# Patient Record
Sex: MALE | Race: BLACK OR AFRICAN AMERICAN | Employment: FULL TIME | URBAN - METROPOLITAN AREA
[De-identification: names, ages, dates, MRNs, and addresses within clinical notes are randomized per-mention and may not be internally consistent; named-entity substitution may affect disease eponyms.]

---

## 2017-03-03 ENCOUNTER — OFFICE VISIT (OUTPATIENT)
Dept: INTERNAL MEDICINE CLINIC | Age: 27
End: 2017-03-03

## 2017-03-03 VITALS
RESPIRATION RATE: 16 BRPM | HEART RATE: 76 BPM | HEIGHT: 66 IN | BODY MASS INDEX: 20.73 KG/M2 | DIASTOLIC BLOOD PRESSURE: 68 MMHG | WEIGHT: 129 LBS | SYSTOLIC BLOOD PRESSURE: 102 MMHG | OXYGEN SATURATION: 98 % | TEMPERATURE: 98.4 F

## 2017-03-03 DIAGNOSIS — F32.A DEPRESSION, UNSPECIFIED DEPRESSION TYPE: Primary | Chronic | ICD-10-CM

## 2017-03-03 DIAGNOSIS — Z23 ENCOUNTER FOR IMMUNIZATION: ICD-10-CM

## 2017-03-03 DIAGNOSIS — B20 HIV (HUMAN IMMUNODEFICIENCY VIRUS INFECTION) (HCC): ICD-10-CM

## 2017-03-03 NOTE — MR AVS SNAPSHOT
Visit Information Date & Time Provider Department Dept. Phone Encounter #  
 3/3/2017 11:45 AM Arnav Ortega, [facility] [phone] [id number] Follow-up Instructions Return in about 6 months (around 9/3/2017). Your Appointments 3/6/2017 11:30 AM  
ROUTINE CARE with Ewa Gant MD  
[facility] [address] [facility]) Appt Note: 4 month f/u; pt r/s from 2/13/17 819 [facility],[address] [address]  
[address] 235 [facility] [address] [place] [address] Upcoming Health Maintenance Date Due Pneumococcal 19-64 Highest Risk (1 of 3 - PCV13) 12/18/2008 DTaP/Tdap/Td series (1 - Tdap) 12/18/2010 INFLUENZA AGE 9 TO ADULT 8/1/2016 Allergies as of 3/3/2017  Review Complete On: 3/3/2017 By: Emperatriz Elizabeth III, DO No Known Allergies Current Immunizations  Reviewed on 9/12/2016 Name Date Hep A Vaccine (Adult) 5/10/2016 Influenza Vaccine 12/3/2013 Influenza Vaccine Intradermal PF 10/20/2015 TB Skin Test (PPD) Intradermal 7/7/2014 Not reviewed this visit You Were Diagnosed With   
  
 Codes Comments Depression, unspecified depression type    -  Primary ICD-10-CM: F32.9 ICD-9-CM: 463 HIV (human immunodeficiency virus infection) ([facility]     ICD-10-CM: U34 ICD-9-CM: V08 Vitals BP  
  
  
  
  
  
 102/68 (BP 1 Location: Left arm, BP Patient Position: Sitting) Vitals History BMI and BSA Data Body Mass Index Body Surface Area  
 20.82 kg/m 2 1.65 m 2 Preferred Pharmacy Pharmacy Name Phone [address] [facility] DEPT Maximo Ruiz [address] [phone] Your Updated Medication List  
  
   
This list is accurate as of: 3/3/17 12:24 PM.  Always use your most recent med list.  
  
  
  
  
 efavirenz-emtrictabine-tenofovir tablet Commonly known as:  ATRIPLA Take 1 Tab by mouth nightly. elvitegravir-cobicistat-emtricitabine-tenofovir alafenamide Tab tablet Commonly known as:  Grace Dubois Take 1 Tab by mouth daily. Indications: HIV INFECTION  
  
 sertraline 50 mg tablet Commonly known as:  ZOLOFT Take 1 Tab by mouth daily. Follow-up Instructions Return in about 6 months (around 9/3/2017). Patient Instructions Recovering From Depression: Care Instructions Your Care Instructions Taking good care of yourself is important as you recover from depression. In time, your symptoms will fade as your treatment takes hold. Do not give up. Instead, focus your energy on getting better. Your mood will improve. It just takes some time. Focus on things that can help you feel better, such as being with friends and family, eating well, and getting enough rest. But take things slowly. Do not do too much too soon. You will begin to feel better gradually. Follow-up care is a key part of your treatment and safety. Be sure to make and go to all appointments, and call your doctor if you are having problems. It's also a good idea to know your test results and keep a list of the medicines you take. How can you care for yourself at home? Be realistic · If you have a large task to do, break it up into smaller steps you can handle, and just do what you can. · You may want to put off important decisions until your depression has lifted. If you have plans that will have a major impact on your life, such as marriage, divorce, or a job change, try to wait a bit. Talk it over with friends and loved ones who can help you look at the overall picture first. 
· Reaching out to people for help is important. Do not isolate yourself. Let your family and friends help you. Find someone you can trust and confide in, and talk to that person. · Be patient, and be kind to yourself.  Remember that depression is not your fault and is not something you can overcome with willpower alone. Treatment is necessary for depression, just like for any other illness. Feeling better takes time, and your mood will improve little by little. Stay active · Stay busy and get outside. Take a walk, or try some other light exercise. · Talk with your doctor about an exercise program. Exercise can help with mild depression. · Go to a movie or concert. Take part in a Scientology activity or other social gathering. Go to a ball game. · Ask a friend to have dinner with you. Take care of yourself · Eat a balanced diet with plenty of fresh fruits and vegetables, whole grains, and lean protein. If you have lost your appetite, eat small snacks rather than large meals. · Avoid drinking alcohol or using illegal drugs. Do not take medicines that have not been prescribed for you. They may interfere with medicines you may be taking for depression, or they may make your depression worse. · Take your medicines exactly as they are prescribed. You may start to feel better within 1 to 3 weeks of taking antidepressant medicine. But it can take as many as 6 to 8 weeks to see more improvement. If you have questions or concerns about your medicines, or if you do not notice any improvement by 3 weeks, talk to your doctor. · If you have any side effects from your medicine, tell your doctor. Antidepressants can make you feel tired, dizzy, or nervous. Some people have dry mouth, constipation, headaches, sexual problems, or diarrhea. Many of these side effects are mild and will go away on their own after you have been taking the medicine for a few weeks. Some may last longer. Talk to your doctor if side effects are bothering you too much. You might be able to try a different medicine. · Get enough sleep. If you have problems sleeping: ¨ Go to bed at the same time every night, and get up at the same time every morning. ¨ Keep your bedroom dark and quiet. ¨ Do not exercise after 5:00 p.m. ¨ Avoid drinks with caffeine after 5:00 p.m. · Avoid sleeping pills unless they are prescribed by the doctor treating your depression. Sleeping pills may make you groggy during the day, and they may interact with other medicine you are taking. · If you have any other illnesses, such as diabetes, heart disease, or high blood pressure, make sure to continue with your treatment. Tell your doctor about all of the medicines you take, including those with or without a prescription. · Keep the numbers for these national suicide hotlines: 9-819-976-TALK (8-525.263.5965) and 1-434-BYYWQMR (1-559.859.7111). If you or someone you know talks about suicide or feeling hopeless, get help right away. When should you call for help? Call 911 anytime you think you may need emergency care. For example, call if: 
· You feel like hurting yourself or someone else. · Someone you know has depression and is about to attempt or is attempting suicide. Call your doctor now or seek immediate medical care if: 
· You hear voices. · Someone you know has depression and: 
¨ Starts to give away his or her possessions. ¨ Uses illegal drugs or drinks alcohol heavily. ¨ Talks or writes about death, including writing suicide notes or talking about guns, knives, or pills. ¨ Starts to spend a lot of time alone. ¨ Acts very aggressively or suddenly appears calm. Watch closely for changes in your health, and be sure to contact your doctor if: 
· You do not get better as expected. Where can you learn more? Go to http://hari-cindy.info/. Enter N292 in the search box to learn more about \"Recovering From Depression: Care Instructions. \" Current as of: July 26, 2016 Content Version: 11.1 © 5346-1145 noodls, Incorporated.  Care instructions adapted under license by [employer] (which disclaims liability or warranty for this information). If you have questions about a medical condition or this instruction, always ask your healthcare professional. [address] any warranty or liability for your use of this information. Introducing [facility] & HEALTH SERVICES! Dear Elbert Araujo: Thank you for requesting a Sportsgrit account. Our records indicate that you already have an active Sportsgrit account. You can access your account anytime at [email or web address] Did you know that you can access your hospital and ER discharge instructions at any time in Sportsgrit? You can also review all of your test results from your hospital stay or ER visit. Additional Information If you have questions, please visit the Frequently Asked Questions section of the Sportsgrit website at [email or web address] Remember, Sportsgrit is NOT to be used for urgent needs. For medical emergencies, dial 911. Now available from your iPhone and Android! Please provide this summary of care documentation to your next provider. Your primary care clinician is listed as Professor Lynch 108 If you have any questions after today's visit, please call [phone].

## 2017-03-03 NOTE — PATIENT INSTRUCTIONS
Recovering From Depression: Care Instructions  Your Care Instructions  Taking good care of yourself is important as you recover from depression. In time, your symptoms will fade as your treatment takes hold. Do not give up. Instead, focus your energy on getting better. Your mood will improve. It just takes some time. Focus on things that can help you feel better, such as being with friends and family, eating well, and getting enough rest. But take things slowly. Do not do too much too soon. You will begin to feel better gradually. Follow-up care is a key part of your treatment and safety. Be sure to make and go to all appointments, and call your doctor if you are having problems. It's also a good idea to know your test results and keep a list of the medicines you take. How can you care for yourself at home? Be realistic  · If you have a large task to do, break it up into smaller steps you can handle, and just do what you can. · You may want to put off important decisions until your depression has lifted. If you have plans that will have a major impact on your life, such as marriage, divorce, or a job change, try to wait a bit. Talk it over with friends and loved ones who can help you look at the overall picture first.  · Reaching out to people for help is important. Do not isolate yourself. Let your family and friends help you. Find someone you can trust and confide in, and talk to that person. · Be patient, and be kind to yourself. Remember that depression is not your fault and is not something you can overcome with willpower alone. Treatment is necessary for depression, just like for any other illness. Feeling better takes time, and your mood will improve little by little. Stay active  · Stay busy and get outside. Take a walk, or try some other light exercise. · Talk with your doctor about an exercise program. Exercise can help with mild depression. · Go to a movie or concert.  Take part in a Yazidi activity or other social gathering. Go to a Teklatech game. · Ask a friend to have dinner with you. Take care of yourself  · Eat a balanced diet with plenty of fresh fruits and vegetables, whole grains, and lean protein. If you have lost your appetite, eat small snacks rather than large meals. · Avoid drinking alcohol or using illegal drugs. Do not take medicines that have not been prescribed for you. They may interfere with medicines you may be taking for depression, or they may make your depression worse. · Take your medicines exactly as they are prescribed. You may start to feel better within 1 to 3 weeks of taking antidepressant medicine. But it can take as many as 6 to 8 weeks to see more improvement. If you have questions or concerns about your medicines, or if you do not notice any improvement by 3 weeks, talk to your doctor. · If you have any side effects from your medicine, tell your doctor. Antidepressants can make you feel tired, dizzy, or nervous. Some people have dry mouth, constipation, headaches, sexual problems, or diarrhea. Many of these side effects are mild and will go away on their own after you have been taking the medicine for a few weeks. Some may last longer. Talk to your doctor if side effects are bothering you too much. You might be able to try a different medicine. · Get enough sleep. If you have problems sleeping:  ¨ Go to bed at the same time every night, and get up at the same time every morning. ¨ Keep your bedroom dark and quiet. ¨ Do not exercise after 5:00 p.m. ¨ Avoid drinks with caffeine after 5:00 p.m. · Avoid sleeping pills unless they are prescribed by the doctor treating your depression. Sleeping pills may make you groggy during the day, and they may interact with other medicine you are taking. · If you have any other illnesses, such as diabetes, heart disease, or high blood pressure, make sure to continue with your treatment.  Tell your doctor about all of the medicines you take, including those with or without a prescription. · Keep the numbers for these national suicide hotlines: 8-798-586-TALK (2-214.100.6072) and 6-121-LALSGRT (6-972.757.6866). If you or someone you know talks about suicide or feeling hopeless, get help right away. When should you call for help? Call 911 anytime you think you may need emergency care. For example, call if:  · You feel like hurting yourself or someone else. · Someone you know has depression and is about to attempt or is attempting suicide. Call your doctor now or seek immediate medical care if:  · You hear voices. · Someone you know has depression and:  ¨ Starts to give away his or her possessions. ¨ Uses illegal drugs or drinks alcohol heavily. ¨ Talks or writes about death, including writing suicide notes or talking about guns, knives, or pills. ¨ Starts to spend a lot of time alone. ¨ Acts very aggressively or suddenly appears calm. Watch closely for changes in your health, and be sure to contact your doctor if:  · You do not get better as expected. Where can you learn more? Go to http://hari-cindy.info/. Enter E323 in the search box to learn more about \"Recovering From Depression: Care Instructions. \"  Current as of: July 26, 2016  Content Version: 11.1  © 6144-9111 J[employer], Incorporated. Care instructions adapted under license by [employer] (which disclaims liability or warranty for this information). If you have questions about a medical condition or this instruction, always ask your healthcare professional. [place] [address] any warranty or liability for your use of this information.

## 2017-03-03 NOTE — PROGRESS NOTES
Hassel Collet III is a 31 y.o. male who presents for evaluation of routine follow up. Last seen by me aug 22. Doing well. zoloft really helps. Pt with no concerns. Had his 4 wisdom teeth pulled out last month. ROS:  Constitutional: negative for fevers, chills, anorexia and weight loss  Eyes:   negative for visual disturbance and irritation  ENT:   negative for tinnitus,sore throat,nasal congestion,ear pain,hoarseness  Respiratory:  negative for cough, hemoptysis, dyspnea,wheezing  CV:   negative for chest pain, palpitations, lower extremity edema  GI:   negative for nausea, vomiting, diarrhea, abdominal pain,melena  Genitourinary: negative for frequency, dysuria and hematuria  Musculoskel: negative for myalgias, arthralgias, back pain, muscle weakness, joint pain  Neurological:  negative for headaches, dizziness, focal weakness, numbness  Psychiatric:     ++ for depression or anxiety--well treated with zoloft      Past Medical History:   Diagnosis Date    Asthma     Depression     Depression 8/22/2016    HIV (human immunodeficiency virus infection) ([facility] 12/3/2013       Past Surgical History:   Procedure Laterality Date    HX WISDOM TEETH EXTRACTION         Family History   Problem Relation Age of Onset    Hypertension Mother     Diabetes Mother     Depression Mother     Depression Father     Hypertension Father        Social History     Social History    Marital status: SINGLE     Spouse name: N/A    Number of children: N/A    Years of education: N/A     Occupational History    Not on file.      Social History Main Topics    Smoking status: Current Some Day Smoker    Smokeless tobacco: Never Used    Alcohol use Yes      Comment: Occasionally    Drug use: No    Sexual activity: Yes     Partners: Male     Other Topics Concern    Not on file     Social History Narrative            Visit Vitals    /68 (BP 1 Location: Left arm, BP Patient Position: Sitting)    Pulse 76    Temp 98.4 °F (36.9 °C) (Oral)    Resp 16    Ht 5' 6\" (1.676 m)    Wt 129 lb (58.5 kg)    SpO2 98%    BMI 20.82 kg/m2       Physical Examination:   General - Well appearing male  HEENT - PERRL, TM no erythema/opacification, normal nasal turbinates, no oropharyngeal erythema or exudate, MMM  Neck - supple, no bruits, no thyroidomegaly, no lymphadenopathy  Pulm - clear to auscultation bilaterally  Cardio - RRR, normal S1 S2, no murmur  Abd - soft, nontender, no masses, no HSM  Extrem - no edema, +2 distal pulses  Neuro-  No focal deficits, CN intact     Assessment/Plan:    1. Depression--doing well with zoloft  2.  hiv--follows with dr Delmy Ramirez, on genvoya  3. Tobacco abuse--few cigarettes daily, cutting back  4. Routine adult health maintenance--flu and pneumovax given today. rtc 6 months, give tdap then, check labs.         Eric Huntley III, DO

## 2017-03-03 NOTE — PROGRESS NOTES
Reviewed record in preparation for visit and have obtained necessary documentation. Identified pt with two pt identifiers(name and ). Chief Complaint   Patient presents with    Depression     follow up       Health Maintenance Due   Topic Date Due    Pneumococcal 19-64 Highest Risk (1 of 3 - PCV13) 2008    DTaP/Tdap/Td series (1 - Tdap) 2010    INFLUENZA AGE 9 TO ADULT  2016       Mr. Sayda Newton has a reminder for a \"due or due soon\" health maintenance. I have asked that he discuss health maintenance topic(s) due with His  primary care provider. Coordination of Care Questionnaire:  :     1) Have you been to an emergency room, urgent care clinic since your last visit? no   Hospitalized since your last visit? no             2) Have you seen or consulted any other health care providers outside of [address] [place] [address] since your last visit? no  (Include any pap smears or colon screenings in this section.)      Patient is accompanied by self I have received verbal consent from Patric Baumgarten III to discuss any/all medical information while they are present in the room.

## 2017-03-06 ENCOUNTER — OFFICE VISIT (OUTPATIENT)
Dept: INTERNAL MEDICINE CLINIC | Age: 27
End: 2017-03-06

## 2017-03-06 VITALS
TEMPERATURE: 97.5 F | RESPIRATION RATE: 16 BRPM | SYSTOLIC BLOOD PRESSURE: 105 MMHG | WEIGHT: 132.8 LBS | HEIGHT: 66 IN | HEART RATE: 80 BPM | DIASTOLIC BLOOD PRESSURE: 68 MMHG | OXYGEN SATURATION: 99 % | BODY MASS INDEX: 21.34 KG/M2

## 2017-03-06 DIAGNOSIS — B20 HUMAN IMMUNODEFICIENCY VIRUS (HIV) DISEASE (HCC): Primary | ICD-10-CM

## 2017-03-06 NOTE — MR AVS SNAPSHOT
Visit Information Date & Time Provider Department Dept. Phone Encounter #  
 3/6/2017 11:30 AM Ivy Frausto, [address] [place] [address],[address] [phone] [id number] Follow-up Instructions Return in about 4 months (around 7/6/2017), or if symptoms worsen or fail to improve. Your Appointments 9/8/2017 11:45 AM  
ROUTINE CARE with Brianmamadou Gibson III, DO [facility] [address]) Appt Note: 6 month follow up  
 [facility] [address] [address]  
[address] [facility] [address] [place] [address] Upcoming Health Maintenance Date Due DTaP/Tdap/Td series (1 - Tdap) 12/18/2010 Pneumococcal 19-64 Highest Risk (2 of 3 - PCV13) 3/3/2018 Allergies as of 3/6/2017  Review Complete On: 3/6/2017 By: Ivy Frausto MD  
 No Known Allergies Current Immunizations  Reviewed on 3/6/2017 Name Date Hep A Vaccine (Adult) 5/10/2016 Influenza Vaccine 12/3/2013 Influenza Vaccine (Quad) PF 3/3/2017 Influenza Vaccine Intradermal PF 10/20/2015 Pneumococcal Polysaccharide (PPSV-23) 3/3/2017 TB Skin Test (PPD) Intradermal 7/7/2014 Reviewed by Ivy Frausto MD on 3/6/2017 at 11:44 AM  
You Were Diagnosed With   
  
 Codes Comments Human immunodeficiency virus (HIV) disease ([facility]    -  Primary ICD-10-CM: B20 
ICD-9-CM: 659 Vitals BP Pulse Temp Resp Height(growth percentile) Weight(growth percentile) 105/68 (BP 1 Location: Left arm, BP Patient Position: Sitting) 80 97.5 °F (36.4 °C) (Oral) 16 5' 6\" (1.676 m) 132 lb 12.8 oz (60.2 kg) SpO2 BMI Smoking Status 99% 21.43 kg/m2 Current Some Day Smoker Vitals History BMI and BSA Data Body Mass Index Body Surface Area  
 21.43 kg/m 2 1.67 m 2 Preferred Pharmacy Pharmacy Name Phone [facility] [facility] [place] DEPT Maximo Chopra [address] [phone] Your Updated Medication List  
  
   
This list is accurate as of: 3/6/17 11:49 AM.  Always use your most recent med list.  
  
  
  
  
 elvitegravir-cobicistat-emtricitabine-tenofovir alafenamide Tab tablet Commonly known as:  Linda Lawrence Take 1 Tab by mouth daily. Indications: HIV INFECTION  
  
 sertraline 50 mg tablet Commonly known as:  ZOLOFT Take 1 Tab by mouth daily. We Performed the Following HIV-1 RNA QT BY PCR [98416 CPT(R)] LYMPHOCYTES, CD4 PERCENT AND ABSOLUTE [59136 CPT(R)] Follow-up Instructions Return in about 4 months (around 2017), or if symptoms worsen or fail to improve. Introducing [facility] & HEALTH SERVICES! Dear Pauline Apgar: Thank you for requesting a Parudi account. Our records indicate that you already have an active Parudi account. You can access your account anytime at [email or web address] Did you know that you can access your hospital and ER discharge instructions at any time in Parudi? You can also review all of your test results from your hospital stay or ER visit. Additional Information If you have questions, please visit the Frequently Asked Questions section of the Parudi website at [email or web address] Remember, Parudi is NOT to be used for urgent needs. For medical emergencies, dial 911. Now available from your iPhone and Android! Please provide this summary of care documentation to your next provider. Your primary care clinician is listed as Fili Espinoza If you have any questions after today's visit, please call [phone].

## 2017-03-06 NOTE — PROGRESS NOTES
The patient will begin 1000 units of OTC vitamin D daily plus exposure to direct sunlight for 15-20 minutes daily. Vitamin D levels will be rechecked at next visit. JODI Lima is a 31 y.o., male and is here today for follow up of HIV infection. The patient has been 100% compliant with HAART. Last CD4 count and Viral Load were 654 (43.6%) and less than 20 cps/ml on 9/12/16. Current HAART is comprised of Genvoya. His only other regular medication is Zoloft for depression, which is much better since getting him off Sustiva. He is also vitamin D deficient. His PCP is Dr. Huseyin Thorne. Allergy history, medication list, past medical history, and social history were all reviewed. No changes were made in any of these. Up to date on all immunizations. Due for pneumovax 23 at next visit. ROS   No fever, sweats, chills, nausea, vomiting, diarrhea, or rash. Weight has been stable. Level of energy is good. No changes in vision. No headaches or cognitive impairment. No medications have been added to the regimen since last visit. No signs of peripheral neuropathy. Review of systems otherwise negative with greater than 10 systems reviewed. Physical Exam  Vital signs and weight are stable. EOMI. Sclera anicteric. No thrush or leukoplakia. Lungs clear to A&P. Regular rhythm without murmur. Abdomen without organomegaly, mass, or tenderness. Bowel sounds normal. No joint abnormalities or edema. No change in lymphadenopathy. Neurologic exam is nonfocal.      ASSESSMENT and PLAN  #1. HIV infection. HIV is clinically and virologically stable. No change in HAART is planned at this time. CD4 count and viral load will be ordered today. Return in 4 months for follow up. The patient will check My Chart for lab results and call if there are any questions. #2. Vit D deficiency. The patient will begin 1000 units of OTC vitamin D daily plus exposure to direct sunlight for 15-20 minutes daily.   Vitamin D levels will be rechecked at next visit. #3. Depression. Much better off Sustiva and on current dose of Zoloft.

## 2017-03-07 LAB
BASOPHILS # BLD AUTO: 0 X10E3/UL (ref 0–0.2)
BASOPHILS NFR BLD AUTO: 0 %
CD3+CD4+ CELLS # BLD: 711 /UL (ref 359–1519)
CD3+CD4+ CELLS NFR BLD: 37.4 % (ref 30.8–58.5)
EOSINOPHIL # BLD AUTO: 0.1 X10E3/UL (ref 0–0.4)
EOSINOPHIL NFR BLD AUTO: 1 %
ERYTHROCYTE [DISTWIDTH] IN BLOOD BY AUTOMATED COUNT: 14.3 % (ref 12.3–15.4)
HCT VFR BLD AUTO: 39.4 % (ref 37.5–51)
HGB BLD-MCNC: 13.4 G/DL (ref 12.6–17.7)
HIV1 RNA # SERPL NAA+PROBE: <20 COPIES/ML
HIV1 RNA SERPL NAA+PROBE-LOG#: NORMAL LOG10COPY/ML
IMM GRANULOCYTES # BLD: 0 X10E3/UL (ref 0–0.1)
IMM GRANULOCYTES NFR BLD: 0 %
LYMPHOCYTES # BLD AUTO: 1.9 X10E3/UL (ref 0.7–3.1)
LYMPHOCYTES NFR BLD AUTO: 55 %
MCH RBC QN AUTO: 31.2 PG (ref 26.6–33)
MCHC RBC AUTO-ENTMCNC: 34 G/DL (ref 31.5–35.7)
MCV RBC AUTO: 92 FL (ref 79–97)
MONOCYTES # BLD AUTO: 0.3 X10E3/UL (ref 0.1–0.9)
MONOCYTES NFR BLD AUTO: 7 %
NEUTROPHILS # BLD AUTO: 1.3 X10E3/UL (ref 1.4–7)
NEUTROPHILS NFR BLD AUTO: 37 %
PLATELET # BLD AUTO: 173 X10E3/UL (ref 150–379)
RBC # BLD AUTO: 4.29 X10E6/UL (ref 4.14–5.8)
WBC # BLD AUTO: 3.5 X10E3/UL (ref 3.4–10.8)

## 2017-04-24 DIAGNOSIS — B20 HUMAN IMMUNODEFICIENCY VIRUS (HIV) DISEASE (HCC): ICD-10-CM

## 2017-04-24 NOTE — TELEPHONE ENCOUNTER
Received phone call from Tyrone Lawson RN with the [facility]. Pt is in need of refill for Genvoya. Medication was ordered and pended to Dr. Zenobia Salcedo.

## 2017-05-23 RX ORDER — SERTRALINE HYDROCHLORIDE 50 MG/1
50 TABLET, FILM COATED ORAL DAILY
Qty: 30 TAB | Refills: 9 | Status: SHIPPED | OUTPATIENT
Start: 2017-05-23 | End: 2018-05-14 | Stop reason: SDUPTHER

## 2017-11-01 ENCOUNTER — PATIENT OUTREACH (OUTPATIENT)
Dept: INTERNAL MEDICINE CLINIC | Age: 27
End: 2017-11-01

## 2017-11-03 ENCOUNTER — TELEPHONE (OUTPATIENT)
Dept: FAMILY MEDICINE CLINIC | Age: 27
End: 2017-11-03

## 2017-11-03 DIAGNOSIS — B20 HIV (HUMAN IMMUNODEFICIENCY VIRUS INFECTION) (HCC): ICD-10-CM

## 2017-11-03 NOTE — TELEPHONE ENCOUNTER
I attempted to contact patient regarding scheduling an appointment with Dr Shruthi Murrell for ADAP refill. No answer. I left vm to return call. **Pt needs to be scheduled in next available appointment slot. **

## 2017-11-06 NOTE — TELEPHONE ENCOUNTER
I called and spoke with patient. Pt was informed that he needs labs done to have refills for ADAP. He verbalized understanding. Labs ordered. He will try to have labs done this week. He states that he will come by the office to pickup lab slip. He was informed of location of office. He verbalized understanding. He was informed that I will send 3 months of Genvoya to pharmacy per Dr Mattie Bueno. He verbalized understanding. Pt was scheduled for appointment with Dr Mattie Bueno 11/16/17 @ 0900. He was informed that labs need to be done before we see him. He verbalized understanding.

## 2017-11-07 DIAGNOSIS — B20 HIV (HUMAN IMMUNODEFICIENCY VIRUS INFECTION) (HCC): Primary | ICD-10-CM

## 2017-11-13 LAB
25(OH)D3+25(OH)D2 SERPL-MCNC: 30.1 NG/ML (ref 30–100)
ALBUMIN SERPL-MCNC: 4.9 G/DL (ref 3.5–5.5)
ALBUMIN/GLOB SERPL: 1.8 {RATIO} (ref 1.2–2.2)
ALP SERPL-CCNC: 69 IU/L (ref 39–117)
ALT SERPL-CCNC: 14 IU/L (ref 0–44)
ANNOTATION COMMENT IMP: NORMAL
AST SERPL-CCNC: 20 IU/L (ref 0–40)
BASOPHILS # BLD AUTO: 0 X10E3/UL (ref 0–0.2)
BASOPHILS NFR BLD AUTO: 0 %
BILIRUB SERPL-MCNC: 0.3 MG/DL (ref 0–1.2)
BUN SERPL-MCNC: 10 MG/DL (ref 6–20)
BUN/CREAT SERPL: 10 (ref 9–20)
CALCIUM SERPL-MCNC: 9.6 MG/DL (ref 8.7–10.2)
CD3+CD4+ CELLS # BLD: 770 /UL (ref 359–1519)
CD3+CD4+ CELLS NFR BLD: 40.5 % (ref 30.8–58.5)
CHLORIDE SERPL-SCNC: 101 MMOL/L (ref 96–106)
CO2 SERPL-SCNC: 26 MMOL/L (ref 18–29)
CREAT SERPL-MCNC: 1.02 MG/DL (ref 0.76–1.27)
EOSINOPHIL # BLD AUTO: 0 X10E3/UL (ref 0–0.4)
EOSINOPHIL NFR BLD AUTO: 0 %
ERYTHROCYTE [DISTWIDTH] IN BLOOD BY AUTOMATED COUNT: 14.4 % (ref 12.3–15.4)
EST. AVERAGE GLUCOSE BLD GHB EST-MCNC: 103 MG/DL
GAMMA INTERFERON BACKGROUND BLD IA-ACNC: 0.04 IU/ML
GFR SERPLBLD CREATININE-BSD FMLA CKD-EPI: 100 ML/MIN/1.73
GFR SERPLBLD CREATININE-BSD FMLA CKD-EPI: 116 ML/MIN/1.73
GLOBULIN SER CALC-MCNC: 2.8 G/DL (ref 1.5–4.5)
GLUCOSE SERPL-MCNC: 86 MG/DL (ref 65–99)
HBA1C MFR BLD: 5.2 % (ref 4.8–5.6)
HBV SURFACE AB SER QL: REACTIVE
HCT VFR BLD AUTO: 41.8 % (ref 37.5–51)
HCV AB S/CO SERPL IA: <0.1 S/CO RATIO (ref 0–0.9)
HGB BLD-MCNC: 14.2 G/DL (ref 12.6–17.7)
HIV1 RNA # SERPL NAA+PROBE: <20 COPIES/ML
HIV1 RNA SERPL NAA+PROBE-LOG#: NORMAL LOG10COPY/ML
IMM GRANULOCYTES # BLD: 0 X10E3/UL (ref 0–0.1)
IMM GRANULOCYTES NFR BLD: 0 %
LYMPHOCYTES # BLD AUTO: 1.9 X10E3/UL (ref 0.7–3.1)
LYMPHOCYTES NFR BLD AUTO: 38 %
M TB IFN-G BLD-IMP: NEGATIVE
M TB IFN-G CD4+ BCKGRND COR BLD-ACNC: 0 IU/ML
M TB IFN-G CD4+ T-CELLS BLD-ACNC: 0.04 IU/ML
MCH RBC QN AUTO: 31.9 PG (ref 26.6–33)
MCHC RBC AUTO-ENTMCNC: 34 G/DL (ref 31.5–35.7)
MCV RBC AUTO: 94 FL (ref 79–97)
MITOGEN IGNF BLD-ACNC: >10 IU/ML
MONOCYTES # BLD AUTO: 0.3 X10E3/UL (ref 0.1–0.9)
MONOCYTES NFR BLD AUTO: 6 %
NEUTROPHILS # BLD AUTO: 2.8 X10E3/UL (ref 1.4–7)
NEUTROPHILS NFR BLD AUTO: 56 %
PLATELET # BLD AUTO: 212 X10E3/UL (ref 150–379)
POTASSIUM SERPL-SCNC: 3.8 MMOL/L (ref 3.5–5.2)
PROT SERPL-MCNC: 7.7 G/DL (ref 6–8.5)
QUANTIFERON INCUBATION: NORMAL
RBC # BLD AUTO: 4.45 X10E6/UL (ref 4.14–5.8)
RPR SER QL: NON REACTIVE
SERVICE CMNT-IMP: NORMAL
SODIUM SERPL-SCNC: 143 MMOL/L (ref 134–144)
WBC # BLD AUTO: 5.1 X10E3/UL (ref 3.4–10.8)

## 2017-11-15 ENCOUNTER — TELEPHONE (OUTPATIENT)
Dept: FAMILY MEDICINE CLINIC | Age: 27
End: 2017-11-15

## 2017-11-15 NOTE — TELEPHONE ENCOUNTER
Pt wants a refill for:  elvitegravir-cobicistat-emtricitabine-tenofovir alafenamide (GENVOYA) tab tablet     States he did have labs done     Pt cancelled his apptmnt for 11/16/17 and did not r/s   States his mom has questions and he has to check her schedule       Best number to reach him is [phone]

## 2017-11-16 NOTE — TELEPHONE ENCOUNTER
I called and spoke with patient. Pt states that he needs refill. He was informed that refills were sent to pharmacy on 11/6/17 to the [facility] department pharmacy at 1140am.     He will call pharmacy.

## 2018-04-17 ENCOUNTER — HOSPITAL ENCOUNTER (EMERGENCY)
Age: 28
Discharge: HOME | End: 2018-04-17
Attending: EMERGENCY MEDICINE
Payer: SELF-PAY

## 2018-04-17 VITALS
SYSTOLIC BLOOD PRESSURE: 127 MMHG | BODY MASS INDEX: 22.5 KG/M2 | TEMPERATURE: 97.3 F | WEIGHT: 140 LBS | HEART RATE: 74 BPM | DIASTOLIC BLOOD PRESSURE: 84 MMHG | RESPIRATION RATE: 16 BRPM | OXYGEN SATURATION: 96 % | HEIGHT: 66 IN

## 2018-04-17 DIAGNOSIS — K52.9 GASTROENTERITIS, ACUTE: Primary | ICD-10-CM

## 2018-04-17 DIAGNOSIS — R11.2 NON-INTRACTABLE VOMITING WITH NAUSEA, UNSPECIFIED VOMITING TYPE: ICD-10-CM

## 2018-04-17 LAB
ALBUMIN SERPL-MCNC: 4.6 G/DL (ref 3.5–5)
ALBUMIN/GLOB SERPL: 1.1 {RATIO} (ref 1.1–2.2)
ALP SERPL-CCNC: 81 U/L (ref 45–117)
ALT SERPL-CCNC: 21 U/L (ref 12–78)
ANION GAP SERPL CALC-SCNC: 5 MMOL/L (ref 5–15)
APPEARANCE UR: ABNORMAL
AST SERPL-CCNC: 20 U/L (ref 15–37)
BACTERIA URNS QL MICRO: NEGATIVE /HPF
BASOPHILS # BLD: 0 K/UL (ref 0–0.1)
BASOPHILS NFR BLD: 0 % (ref 0–1)
BILIRUB SERPL-MCNC: 0.5 MG/DL (ref 0.2–1)
BILIRUB UR QL: NEGATIVE
BUN SERPL-MCNC: 6 MG/DL (ref 6–20)
BUN/CREAT SERPL: 6 (ref 12–20)
CALCIUM SERPL-MCNC: 9.5 MG/DL (ref 8.5–10.1)
CHLORIDE SERPL-SCNC: 108 MMOL/L (ref 97–108)
CO2 SERPL-SCNC: 29 MMOL/L (ref 21–32)
COLOR UR: ABNORMAL
CREAT SERPL-MCNC: 1.08 MG/DL (ref 0.7–1.3)
DIFFERENTIAL METHOD BLD: ABNORMAL
EOSINOPHIL # BLD: 0 K/UL (ref 0–0.4)
EOSINOPHIL NFR BLD: 0 % (ref 0–7)
EPITH CASTS URNS QL MICRO: ABNORMAL /LPF
ERYTHROCYTE [DISTWIDTH] IN BLOOD BY AUTOMATED COUNT: 13.1 % (ref 11.5–14.5)
GLOBULIN SER CALC-MCNC: 4.1 G/DL (ref 2–4)
GLUCOSE SERPL-MCNC: 116 MG/DL (ref 65–100)
GLUCOSE UR STRIP.AUTO-MCNC: NEGATIVE MG/DL
HCT VFR BLD AUTO: 44.7 % (ref 36.6–50.3)
HGB BLD-MCNC: 15.3 G/DL (ref 12.1–17)
HGB UR QL STRIP: NEGATIVE
HYALINE CASTS URNS QL MICRO: ABNORMAL /LPF (ref 0–5)
IMM GRANULOCYTES # BLD: 0 K/UL (ref 0–0.04)
IMM GRANULOCYTES NFR BLD AUTO: 0 % (ref 0–0.5)
KETONES UR QL STRIP.AUTO: NEGATIVE MG/DL
LEUKOCYTE ESTERASE UR QL STRIP.AUTO: NEGATIVE
LYMPHOCYTES # BLD: 1.3 K/UL (ref 0.8–3.5)
LYMPHOCYTES NFR BLD: 20 % (ref 12–49)
MCH RBC QN AUTO: 32.6 PG (ref 26–34)
MCHC RBC AUTO-ENTMCNC: 34.2 G/DL (ref 30–36.5)
MCV RBC AUTO: 95.1 FL (ref 80–99)
MONOCYTES # BLD: 0.2 K/UL (ref 0–1)
MONOCYTES NFR BLD: 3 % (ref 5–13)
NEUTS SEG # BLD: 5.1 K/UL (ref 1.8–8)
NEUTS SEG NFR BLD: 76 % (ref 32–75)
NITRITE UR QL STRIP.AUTO: NEGATIVE
NRBC # BLD: 0 K/UL (ref 0–0.01)
NRBC BLD-RTO: 0 PER 100 WBC
PH UR STRIP: 8 [PH] (ref 5–8)
PLATELET # BLD AUTO: 205 K/UL (ref 150–400)
PMV BLD AUTO: 10.6 FL (ref 8.9–12.9)
POTASSIUM SERPL-SCNC: 3.7 MMOL/L (ref 3.5–5.1)
PROT SERPL-MCNC: 8.7 G/DL (ref 6.4–8.2)
PROT UR STRIP-MCNC: 30 MG/DL
RBC # BLD AUTO: 4.7 M/UL (ref 4.1–5.7)
RBC #/AREA URNS HPF: ABNORMAL /HPF (ref 0–5)
SODIUM SERPL-SCNC: 142 MMOL/L (ref 136–145)
SP GR UR REFRACTOMETRY: 1.02 (ref 1–1.03)
UA: UC IF INDICATED,UAUC: ABNORMAL
UROBILINOGEN UR QL STRIP.AUTO: 0.2 EU/DL (ref 0.2–1)
WBC # BLD AUTO: 6.7 K/UL (ref 4.1–11.1)
WBC URNS QL MICRO: ABNORMAL /HPF (ref 0–4)

## 2018-04-17 PROCEDURE — 74011250636 HC RX REV CODE- 250/636: Performed by: PHYSICIAN ASSISTANT

## 2018-04-17 PROCEDURE — 80053 COMPREHEN METABOLIC PANEL: CPT | Performed by: PHYSICIAN ASSISTANT

## 2018-04-17 PROCEDURE — 96361 HYDRATE IV INFUSION ADD-ON: CPT

## 2018-04-17 PROCEDURE — 81001 URINALYSIS AUTO W/SCOPE: CPT | Performed by: PHYSICIAN ASSISTANT

## 2018-04-17 PROCEDURE — 36415 COLL VENOUS BLD VENIPUNCTURE: CPT | Performed by: PHYSICIAN ASSISTANT

## 2018-04-17 PROCEDURE — 99284 EMERGENCY DEPT VISIT MOD MDM: CPT

## 2018-04-17 PROCEDURE — 74011250637 HC RX REV CODE- 250/637: Performed by: PHYSICIAN ASSISTANT

## 2018-04-17 PROCEDURE — 96374 THER/PROPH/DIAG INJ IV PUSH: CPT

## 2018-04-17 PROCEDURE — 85025 COMPLETE CBC W/AUTO DIFF WBC: CPT | Performed by: PHYSICIAN ASSISTANT

## 2018-04-17 RX ORDER — ONDANSETRON 4 MG/1
4 TABLET, ORALLY DISINTEGRATING ORAL
Qty: 10 TAB | Refills: 0 | Status: SHIPPED | OUTPATIENT
Start: 2018-04-17 | End: 2018-07-10 | Stop reason: ALTCHOICE

## 2018-04-17 RX ORDER — ONDANSETRON 4 MG/1
4 TABLET, ORALLY DISINTEGRATING ORAL
Status: COMPLETED | OUTPATIENT
Start: 2018-04-17 | End: 2018-04-17

## 2018-04-17 RX ORDER — ONDANSETRON 2 MG/ML
4 INJECTION INTRAMUSCULAR; INTRAVENOUS
Status: COMPLETED | OUTPATIENT
Start: 2018-04-17 | End: 2018-04-17

## 2018-04-17 RX ADMIN — ONDANSETRON HYDROCHLORIDE 4 MG: 2 INJECTION, SOLUTION INTRAMUSCULAR; INTRAVENOUS at 09:14

## 2018-04-17 RX ADMIN — ONDANSETRON 4 MG: 4 TABLET, ORALLY DISINTEGRATING ORAL at 09:00

## 2018-04-17 RX ADMIN — SODIUM CHLORIDE 1000 ML: 900 INJECTION, SOLUTION INTRAVENOUS at 09:14

## 2018-04-17 NOTE — DISCHARGE INSTRUCTIONS
Gastroenteritis: Care Instructions  Your Care Instructions    Gastroenteritis is an illness that may cause nausea, vomiting, and diarrhea. It is sometimes called \"stomach flu. \" It can be caused by bacteria or a virus. You will probably begin to feel better in 1 to 2 days. In the meantime, get plenty of rest and make sure you do not become dehydrated. Dehydration occurs when your body loses too much fluid. Follow-up care is a key part of your treatment and safety. Be sure to make and go to all appointments, and call your doctor if you are having problems. It's also a good idea to know your test results and keep a list of the medicines you take. How can you care for yourself at home? · If your doctor prescribed antibiotics, take them as directed. Do not stop taking them just because you feel better. You need to take the full course of antibiotics. · Drink plenty of fluids to prevent dehydration, enough so that your urine is light yellow or clear like water. Choose water and other caffeine-free clear liquids until you feel better. If you have kidney, heart, or liver disease and have to limit fluids, talk with your doctor before you increase your fluid intake. · Drink fluids slowly, in frequent, small amounts, because drinking too much too fast can cause vomiting. · Begin eating mild foods, such as dry toast, yogurt, applesauce, bananas, and rice. Avoid spicy, hot, or high-fat foods, and do not drink alcohol or caffeine for a day or two. Do not drink milk or eat ice cream until you are feeling better. How to prevent gastroenteritis  · Keep hot foods hot and cold foods cold. · Do not eat meats, dressings, salads, or other foods that have been kept at room temperature for more than 2 hours. · Use a thermometer to check your refrigerator. It should be between 34°F and 40°F.  · Defrost meats in the refrigerator or microwave, not on the kitchen counter. · Keep your hands and your kitchen clean.  Wash your hands, cutting boards, and countertops with hot soapy water frequently. · Cook meat until it is well done. · Do not eat raw eggs or uncooked sauces made with raw eggs. · Do not take chances. If food looks or tastes spoiled, throw it out. When should you call for help? Call 911 anytime you think you may need emergency care. For example, call if:  ? · You vomit blood or what looks like coffee grounds. ? · You passed out (lost consciousness). ? · You pass maroon or very bloody stools. ?Call your doctor now or seek immediate medical care if:  ? · You have severe belly pain. ? · You have signs of needing more fluids. You have sunken eyes, a dry mouth, and pass only a little dark urine. ? · You feel like you are going to faint. ? · You have increased belly pain that does not go away in 1 to 2 days. ? · You have new or increased nausea, or you are vomiting. ? · You have a new or higher fever. ? · Your stools are black and tarlike or have streaks of blood. ? Watch closely for changes in your health, and be sure to contact your doctor if:  ? · You are dizzy or lightheaded. ? · You urinate less than usual, or your urine is dark yellow or brown. ? · You do not feel better with each day that goes by. Where can you learn more? Go to http://hari-cindy.info/. Enter N142 in the search box to learn more about \"Gastroenteritis: Care Instructions. \"  Current as of: March 3, 2017  Content Version: 11.4  © 9230-5479 [employer]. Care instructions adapted under license by [employer] (which disclaims liability or warranty for this information). If you have questions about a medical condition or this instruction, always ask your healthcare professional. [address] any warranty or liability for your use of this information.        Nausea and Vomiting: Care Instructions  Your Care Instructions    When you are nauseated, you may feel weak and sweaty and notice a lot of saliva in your mouth. Nausea often leads to vomiting. Most of the time you do not need to worry about nausea and vomiting, but they can be signs of other illnesses. Two common causes of nausea and vomiting are stomach flu and food poisoning. Nausea and vomiting from viral stomach flu will usually start to improve within 24 hours. Nausea and vomiting from food poisoning may last from 12 to 48 hours. The doctor has checked you carefully, but problems can develop later. If you notice any problems or new symptoms, get medical treatment right away. Follow-up care is a key part of your treatment and safety. Be sure to make and go to all appointments, and call your doctor if you are having problems. It's also a good idea to know your test results and keep a list of the medicines you take. How can you care for yourself at home? · To prevent dehydration, drink plenty of fluids, enough so that your urine is light yellow or clear like water. Choose water and other caffeine-free clear liquids until you feel better. If you have kidney, heart, or liver disease and have to limit fluids, talk with your doctor before you increase the amount of fluids you drink. · Rest in bed until you feel better. · When you are able to eat, try clear soups, mild foods, and liquids until all symptoms are gone for 12 to 48 hours. Other good choices include dry toast, crackers, cooked cereal, and gelatin dessert, such as Jell-O. When should you call for help? Call 911 anytime you think you may need emergency care. For example, call if:  ? · You passed out (lost consciousness). ?Call your doctor now or seek immediate medical care if:  ? · You have symptoms of dehydration, such as:  ¨ Dry eyes and a dry mouth. ¨ Passing only a little dark urine. ¨ Feeling thirstier than usual.   ? · You have new or worsening belly pain. ? · You have a new or higher fever. ? · You vomit blood or what looks like coffee grounds. ?Watch closely for changes in your health, and be sure to contact your doctor if:  ? · You have ongoing nausea and vomiting. ? · Your vomiting is getting worse. ? · Your vomiting lasts longer than 2 days. ? · You are not getting better as expected. Where can you learn more? Go to http://hari-cindy.info/. Enter 25 [id number] in the search box to learn more about \"Nausea and Vomiting: Care Instructions. \"  Current as of: March 20, 2017  Content Version: 11.4  © 9738-2095 [employer]. Care instructions adapted under license by [employer] (which disclaims liability or warranty for this information). If you have questions about a medical condition or this instruction, always ask your healthcare professional. [address] any warranty or liability for your use of this information.

## 2018-04-17 NOTE — ED PROVIDER NOTES
EMERGENCY DEPARTMENT HISTORY AND PHYSICAL EXAM      Date: 4/17/2018  Patient Name: Madisyn Welsh III    History of Presenting Illness     Chief Complaint   Patient presents with    Vomiting     Per EMS n/v onset this morning    Chills     osnet this morning        History Provided By: Patient    HPI: Madisyn Welsh III, 28 y.o. male with PMHx significant for HIV, presents via EMS to the ED for evaluation of abdominal pain and vomiting since waking up at 5:00am this morning. Patient reports 9-10 episodes of vomiting today. He denies any recent abnormal foods or ill contact. He states he has been compliant with all of his HIV medications and denies any recent changes to his regimen. He states his most recent viral load was undetectable but does not remember his CD4 count. He reports some generalized weakness and chills but denies any headache, dizziness, SOB, cough, congestion, sore throat, difficulty urinating, or chest pain. PCP: Geo Cm III,    ID: Cinthia Litten, MD, FACP    There are no other complaints, changes, or physical findings at this time. Current Outpatient Prescriptions   Medication Sig Dispense Refill    ondansetron (ZOFRAN ODT) 4 mg disintegrating tablet Take 1 Tab by mouth every eight (8) hours as needed for Nausea. 10 Tab 0    elvitegravir-cobicistat-emtricitabine-tenofovir alafenamide (GENVOYA) tab tablet Take 1 Tab by mouth daily. Indications: HIV infection 30 Tab 2    sertraline (ZOLOFT) 50 mg tablet Take 1 Tab by mouth daily.  30 Tab 9     Past History     Past Medical History:  Past Medical History:   Diagnosis Date    Asthma     Depression     Depression 8/22/2016    [facility] 12/3[facility]2013       Past Surgical History:  Past Surgical History:   Procedure Laterality Date    HX WISDOM TEETH EXTRACTION         Family History:  Family History   Problem Relation Age of Onset    Hypertension Mother     Diabetes Mother    [facility] Depression Mother    [facility] Depression Father     Hypertension Father        Social History:  Social History   Substance Use Topics    Smoking status: Current Some Day Smoker    Smokeless tobacco: Never Used    Alcohol use Yes      Comment: Occasionally       Allergies:  No Known Allergies    Review of Systems   Review of Systems   Constitutional: Positive for chills. HENT: Negative for congestion and sore throat. Respiratory: Negative for cough and shortness of breath. Cardiovascular: Negative for chest pain. Gastrointestinal: Positive for abdominal pain and vomiting. Genitourinary: Negative for difficulty urinating. Neurological: Positive for weakness (generalized). Negative for dizziness and headaches. All other systems reviewed and are negative. Physical Exam   Physical Exam   Constitutional: He is oriented to person, place, and time. He appears well-developed and well-nourished. No distress. 29 yo -American male who is actively vomiting   HENT:   Head: Normocephalic and atraumatic. Eyes: Conjunctivae are normal. Right eye exhibits no discharge. Left eye exhibits no discharge. Neck: Normal range of motion. Neck supple. Cardiovascular: Normal rate, regular rhythm and normal heart sounds. No murmur heard. Pulmonary/Chest: Effort normal and breath sounds normal. No respiratory distress. He has no wheezes. Abdominal: Soft. Normal appearance and bowel sounds are normal. He exhibits no distension. There is no tenderness. There is no rebound, no guarding and no CVA tenderness. Neurological: He is alert and oriented to person, place, and time. Skin: Skin is warm and dry. He is not diaphoretic. Psychiatric: He has a normal mood and affect. His behavior is normal.   Nursing note and vitals reviewed.     Diagnostic Study Results     Labs -     Recent Results (from the past 12 hour(s))   CBC WITH AUTOMATED DIFF    Collection Time: 04/17/18  9:09 AM   Result Value Ref Range    WBC 6.7 4.1 - 11.1 K/uL    RBC 4.70 4. 10 - 5.70 M/uL    HGB 15.3 12.1 - 17.0 g/dL    HCT 44.7 36.6 - 50.3 %    MCV 95.1 80.0 - 99.0 FL    MCH 32.6 26.0 - 34.0 PG    MCHC 34.2 30.0 - 36.5 g/dL    RDW 13.1 11.5 - 14.5 %    PLATELET 709 996 - 842 K/uL    MPV 10.6 8.9 - 12.9 FL    NRBC 0.0 0  WBC    ABSOLUTE NRBC 0.00 0.00 - 0.01 K/uL    NEUTROPHILS 76 (H) 32 - 75 %    LYMPHOCYTES 20 12 - 49 %    MONOCYTES 3 (L) 5 - 13 %    EOSINOPHILS 0 0 - 7 %    BASOPHILS 0 0 - 1 %    IMMATURE GRANULOCYTES 0 0.0 - 0.5 %    ABS. NEUTROPHILS 5.1 1.8 - 8.0 K/UL    ABS. LYMPHOCYTES 1.3 0.8 - 3.5 K/UL    ABS. MONOCYTES 0.2 0.0 - 1.0 K/UL    ABS. EOSINOPHILS 0.0 0.0 - 0.4 K/UL    ABS. BASOPHILS 0.0 0.0 - 0.1 K/UL    ABS. IMM. GRANS. 0.0 0.00 - 0.04 K/UL    DF AUTOMATED     METABOLIC PANEL, COMPREHENSIVE    Collection Time: 04/17/18  9:09 AM   Result Value Ref Range    Sodium 142 136 - 145 mmol/L    Potassium 3.7 3.5 - 5.1 mmol/L    Chloride 108 97 - 108 mmol/L    CO2 29 21 - 32 mmol/L    Anion gap 5 5 - 15 mmol/L    Glucose 116 (H) 65 - 100 mg/dL    BUN 6 6 - 20 MG/DL    Creatinine 1.08 0.70 - 1.30 MG/DL    BUN/Creatinine ratio 6 (L) 12 - 20      GFR est AA >60 >60 ml/min/1.73m2    GFR est non-AA >60 >60 ml/min/1.73m2    Calcium 9.5 8.5 - 10.1 MG/DL    Bilirubin, total 0.5 0.2 - 1.0 MG/DL    ALT (SGPT) 21 12 - 78 U/L    AST (SGOT) 20 15 - 37 U/L    Alk.  phosphatase 81 45 - 117 U/L    Protein, total 8.7 (H) 6.4 - 8.2 g/dL    Albumin 4.6 3.5 - 5.0 g/dL    Globulin 4.1 (H) 2.0 - 4.0 g/dL    A-G Ratio 1.1 1.1 - 2.2     URINALYSIS W/ REFLEX CULTURE    Collection Time: 04/17/18  9:39 AM   Result Value Ref Range    Color YELLOW/STRAW      Appearance CLOUDY (A) CLEAR      Specific gravity 1.017 1.003 - 1.030      pH (UA) 8.0 5.0 - 8.0      Protein 30 (A) NEG mg/dL    Glucose NEGATIVE  NEG mg/dL    Ketone NEGATIVE  NEG mg/dL    Bilirubin NEGATIVE  NEG      Blood NEGATIVE  NEG      Urobilinogen 0.2 0.2 - 1.0 EU/dL    Nitrites NEGATIVE  NEG Leukocyte Esterase NEGATIVE  NEG      WBC 0-4 0 - 4 /hpf    RBC 0-5 0 - 5 /hpf    Epithelial cells FEW FEW /lpf    Bacteria NEGATIVE  NEG /hpf    UA:UC IF INDICATED CULTURE NOT INDICATED BY UA RESULT CNI      Hyaline cast 0-2 0 - 5 /lpf     Medical Decision Making   I am the first provider for this patient. I reviewed the vital signs, available nursing notes, past medical history, past surgical history, family history and social history. Vital Signs-Reviewed the patient's vital signs. Patient Vitals for the past 12 hrs:   Temp Pulse Resp BP SpO2   04/17/18 0844 97.3 °F (36.3 °C) 74 16 127/84 96 %     Records Reviewed: Nursing Notes and Old Medical Records    Provider Notes (Medical Decision Making):   DDx: Gastroenteritis, Food poisoning, Dehydration, Electrolyte abnormality, Appendicitis, UTI    ED Course:   Initial assessment performed. The patients presenting problems have been discussed, and they are in agreement with the care plan formulated and outlined with them. I have encouraged them to ask questions as they arise throughout their visit. 10:20 AM  Patient's history and labs We reviewed the labs and she is in agreement with the plan. Progress Note:  10:26 AM  He is feeling much beter, denies any abdominal pain. He has had no vomiting in an hour. Pt's abdomen is non-tender to palpation. He was able to tolerate 600mL of water. Disposition:  Discharge Note:  10:28 AM  The pt is ready for discharge. The pt's signs, symptoms, diagnosis, and discharge instructions have been discussed and pt has conveyed their understanding. The pt is to follow up as recommended or return to ER should their symptoms worsen. Plan has been discussed and pt is in agreement. PLAN:  1. Discharge  Discharge Medication List as of 4/17/2018 10:25 AM      START taking these medications    Details   ondansetron (ZOFRAN ODT) 4 mg disintegrating tablet Take 1 Tab by mouth every eight (8) hours as needed for Nausea. , Normal, Disp-10 Tab, R-0         CONTINUE these medications which have NOT CHANGED    Details   elvitegravir-cobicistat-emtricitabine-tenofovir alafenamide (GENVOYA) tab tablet Take 1 Tab by mouth daily. Indications: HIV infection, Normal, Disp-30 Tab, R-2      sertraline (ZOLOFT) 50 mg tablet Take 1 Tab by mouth daily. , Normal, Disp-30 Tab, R-9           2. Follow-up Information     Follow up With Details Comments [address] III, DO In 1 week  [address] [facility]  [address] [facility]      Pinkie Kawasaki, MD In 1 week  [id number] [facility] [address] [address] [phone]          Return to ED if worse     Diagnosis     Clinical Impression:   1. Gastroenteritis, acute    2. Non-intractable vomiting with nausea, unspecified vomiting type        Attestations: This note is prepared by Cassia Collins, acting as Scribe for Agusto Goodman: The scribe's documentation has been prepared under my direction and personally reviewed by me in its entirety. I confirm that the note above accurately reflects all work, treatment, procedures, and medical decision making performed by me.

## 2018-04-17 NOTE — LETTER
[facility] EMERGENCY DEPT 
[address] [place] [address] [address] [id number] [phone] Work/School Note Date: 4/17/2018 To Whom It May concern: 
 
Everardo Montgomery III was seen and treated today in the emergency room by the following provider(s): 
Attending Provider: Demar Sanchez MD. Please excuse Everardo Montgomery III from work today and tomorrow. Sincerely, Madison Rouse

## 2018-05-15 RX ORDER — SERTRALINE HYDROCHLORIDE 50 MG/1
50 TABLET, FILM COATED ORAL DAILY
Qty: 90 TAB | Refills: 3 | Status: SHIPPED | OUTPATIENT
Start: 2018-05-15 | End: 2019-07-16 | Stop reason: SDUPTHER

## 2018-06-04 ENCOUNTER — TELEPHONE (OUTPATIENT)
Dept: FAMILY MEDICINE CLINIC | Age: 28
End: 2018-06-04

## 2018-06-04 DIAGNOSIS — B20 HIV (HUMAN IMMUNODEFICIENCY VIRUS INFECTION) (HCC): ICD-10-CM

## 2018-06-04 NOTE — TELEPHONE ENCOUNTER
Received refill request for Genvoya. Per Dr Ángel Edwards, can send refill. I attempted to contact patient. No answer. I left vm to return call. **Pt must make appointment for new patient appointment with Dr Ángel Edwards before additional refills will be given.

## 2018-06-29 ENCOUNTER — TELEPHONE (OUTPATIENT)
Dept: FAMILY MEDICINE CLINIC | Age: 28
End: 2018-06-29

## 2018-07-10 ENCOUNTER — OFFICE VISIT (OUTPATIENT)
Dept: FAMILY MEDICINE CLINIC | Age: 28
End: 2018-07-10

## 2018-07-10 VITALS
WEIGHT: 134 LBS | BODY MASS INDEX: 21.53 KG/M2 | HEART RATE: 58 BPM | RESPIRATION RATE: 16 BRPM | OXYGEN SATURATION: 99 % | SYSTOLIC BLOOD PRESSURE: 116 MMHG | HEIGHT: 66 IN | TEMPERATURE: 97.2 F | DIASTOLIC BLOOD PRESSURE: 84 MMHG

## 2018-07-10 DIAGNOSIS — B20 HIV (HUMAN IMMUNODEFICIENCY VIRUS INFECTION) (HCC): ICD-10-CM

## 2018-07-10 DIAGNOSIS — E78.00 PURE HYPERCHOLESTEROLEMIA: ICD-10-CM

## 2018-07-10 DIAGNOSIS — F31.9 DEPRESSED BIPOLAR DISORDER (HCC): ICD-10-CM

## 2018-07-10 DIAGNOSIS — E55.9 VITAMIN D DEFICIENCY: ICD-10-CM

## 2018-07-10 DIAGNOSIS — F06.4 ANXIETY DISORDER DUE TO KNOWN PHYSIOLOGICAL CONDITION: ICD-10-CM

## 2018-07-10 DIAGNOSIS — Z23 ENCOUNTER FOR IMMUNIZATION: Primary | ICD-10-CM

## 2018-07-10 DIAGNOSIS — R73.9 ELEVATED BLOOD SUGAR: ICD-10-CM

## 2018-07-10 NOTE — MR AVS SNAPSHOT
[address] [facility]. 
[phone] Patient: Carolina Toussaint III 
MRN: [id number] :1990 Visit Information Date & Time Provider Department Dept. Phone Encounter #  
 7/10/2018  3:00 PM Arie Cronin MD [facility] at [address] [place] [address] [id number] Follow-up Instructions Return in about 3 months (around 10/10/2018). Your Appointments 10/9/2018  2:30 PM  
ROUTINE CARE with Arie Cronin MD  
[facility] at [facility] [address]) Appt Note: FOLLOW UP  
 [facility] 203 [address] [address]  
Critical access hospital [address] Upcoming Health Maintenance Date Due DTaP/Tdap/Td series (1 - Tdap) 2010 Pneumococcal 19-64 Highest Risk (2 of 3 - PCV13) 3/3/2018 Influenza Age 5 to Adult 2018 Allergies as of 7/10/2018  Review Complete On: 7/10/2018 By: Arie Cronin MD  
 No Known Allergies Current Immunizations  Reviewed on 7/10/2018 Name Date Hep A Vaccine (Adult) 5/10/2016 Influenza Vaccine 12/3/2013 Influenza Vaccine (Quad) PF 3/3/2017 Influenza Vaccine Intradermal PF 10/20/2015 Pneumococcal Conjugate (PCV-13)  Incomplete Pneumococcal Polysaccharide (PPSV-23) 3/3/2017 TB Skin Test (PPD) Intradermal 2014 Reviewed by Arie Cronin MD on 7/10/2018 at  4:28 PM  
You Were Diagnosed With   
  
 Codes Comments Encounter for immunization    -  Primary ICD-10-CM: K97 ICD-9-CM: V03.89   
 HIV (human immunodeficiency virus infection) ([facility]     ICD-10-CM: B20 
ICD-9-CM: V08 Depressed bipolar disorder ([facility].)     ICD-10-CM: F31.30 ICD-9-CM: 296.50 Anxiety disorder due to known physiological condition     ICD-10-CM: F06.4 ICD-9-CM: 300.00 Vitamin D deficiency     ICD-10-CM: E55.9 ICD-9-CM: 268.9 Pure hypercholesterolemia     ICD-10-CM: E78.00 ICD-9-CM: 272.0 Elevated blood sugar     ICD-10-CM: R73.9 ICD-9-CM: 790.29 Vitals BP Pulse Temp Resp Height(growth percentile) Weight(growth percentile) 116/84 (BP 1 Location: Left arm, BP Patient Position: Sitting) (!) 58 97.2 °F (36.2 °C) (Oral) 16 5' 6\" (1.676 m) 134 lb (60.8 kg) SpO2 BMI Smoking Status 99% 21.63 kg/m2 Current Some Day Smoker Vitals History BMI and BSA Data Body Mass Index Body Surface Area  
 21.63 kg/m 2 1.68 m 2 Preferred Pharmacy Pharmacy Name Phone [address] [place] [address] DEPT [facility] [phone] Your Updated Medication List  
  
   
This list is accurate as of 7/10/18  4:36 PM.  Always use your most recent med list.  
  
  
  
  
 elvitegravir-cobicistat-emtricitabine-tenofovir alafenamide Tab tablet Commonly known as:  Lorice Bong Take 1 Tab by mouth daily. Must be seen before any additional refills. Indications: HIV infection  
  
 sertraline 50 mg tablet Commonly known as:  ZOLOFT Take 1 Tab by mouth daily. Indications: ANXIETY WITH DEPRESSION Prescriptions Sent to Pharmacy Refills  
 elvitegravir-cobicistat-emtricitabine-tenofovir alafenamide (GENVOYA) tab tablet 6 Sig: Take 1 Tab by mouth daily. Must be seen before any additional refills. Indications: HIV infection Class: Normal  
 Pharmacy: [address] [place] [address] DEPT Max[facility] Ph #: [phone] Route: Oral  
  
We Performed the Following CBC WITH AUTOMATED DIFF [85890 CPT(R)] HEMOGLOBIN A1C WITH EAG [52448 CPT(R)] HEP B SURFACE AB [id number] CPT(R)] HIV-1 RNA QT BY PCR [52302 CPT(R)] LIPID PANEL [25526 CPT(R)] LYMPHOCYTES, CD4 PERCENT AND ABSOLUTE [07713 CPT(R)] METABOLIC PANEL, COMPREHENSIVE [63352 CPT(R)] PNEUMOCOCCAL CONJ VACCINE 13 VALENT IM [id number] CPT(R)] QUANTIFERON TB GOLD [[id number] Custom] RPR W/REFLEX TITER AND TREPONEMA ABS [[id number] Custom] VITAMIN D, 25 HYDROXY [id number] CPT(R)] Follow-up Instructions Return in about 3 months (around 10/10/2018). Patient Instructions Ultimate Softwarehart Activation Thank you for requesting access to Doximity. Please follow the instructions below to securely access and download your online medical record. Doximity allows you to send messages to your doctor, view your test results, renew your prescriptions, schedule appointments, and more. How Do I Sign Up? 1. In your internet browser, go to [email or web address]. 2. Click on the First Time User? Click Here link in the Sign In box. You will see the New Member Sign Up page. 3. Enter your Doximity Access Code exactly as it appears below. You will not need to use this code after youve completed the sign-up process. If you do not sign up before the expiration date, you must request a new code. Doximity Access Code: Activation code not generated Current Doximity Status: Active (This is the date your Doximity access code will ) 4. Enter the last four digits of your Social Security Number (xxxx) and Date of Birth (mm/dd/yyyy) as indicated and click Submit. You will be taken to the next sign-up page. 5. Create a Doximity ID. This will be your Doximity login ID and cannot be changed, so think of one that is secure and easy to remember. 6. Create a Doximity password. You can change your password at any time. 7. Enter your Password Reset Question and Answer. This can be used at a later time if you forget your password. 8. Enter your e-mail address. You will receive e-mail notification when new information is available in [address] 9. Click Sign Up. You can now view and download portions of your medical record. 10. Click the Download Summary menu link to download a portable copy of your medical information. Additional Information If you have questions, please visit the Frequently Asked Questions section of the [email or web address] website at [email or web address] Remember, MyChart is NOT to be used for urgent needs. For medical emergencies, dial 911. Learning About High Blood Sugar What is high blood sugar? Your body turns the food you eat into glucose (sugar), which it uses for energy. But if your body isn't able to use the sugar right away, it can build up in your blood and lead to high blood sugar. When the amount of sugar in your blood stays too high for too much of the time, you may have diabetes. Diabetes is a disease that can cause serious health problems. The good news is that lifestyle changes may help you get your blood sugar back to normal and avoid or delay diabetes. What causes high blood sugar? Sugar (glucose) can build up in your blood if you: · Are overweight. · Have a family history of diabetes. · Take certain medicines, such as steroids. What are the symptoms? Having high blood sugar may not cause any symptoms at all. Or it may make you feel very thirsty or very hungry. You may also urinate more often than usual, have blurry vision, or lose weight without trying. How is high blood sugar treated? You can take steps to lower your blood sugar level if you understand what makes it get higher. Your doctor may want you to learn how to test your blood sugar level at home. Then you can see how illness, stress, or different kinds of food or medicine raise or lower your blood sugar level. Other tests may be needed to see if you have diabetes. How can you prevent high blood sugar? · Watch your weight. If you're overweight, losing just a small amount of weight may help. Reducing fat around your waist is most important. · Limit the amount of calories, sweets, and unhealthy fat you eat. Ask your doctor if a dietitian can help you.  A registered dietitian can help you create meal plans that fit your lifestyle. · Get at least 30 minutes of exercise on most days of the week. Exercise helps control your blood sugar. It also helps you maintain a healthy weight. Walking is a good choice. You also may want to do other activities, such as running, swimming, cycling, or playing tennis or team sports. · If your doctor prescribed medicines, take them exactly as prescribed. Call your doctor if you think you are having a problem with your medicine. You will get more details on the specific medicines your doctor prescribes. Follow-up care is a key part of your treatment and safety. Be sure to make and go to all appointments, and call your doctor if you are having problems. It's also a good idea to know your test results and keep a list of the medicines you take. Where can you learn more? Go to http://hari-cindy.info/. Enter O108 in the search box to learn more about \"Learning About High Blood Sugar. \" Current as of: December 7, 2017 Content Version: 11.7 © 6244-1427 [employer]. Care instructions adapted under license by Pol[employer] (which disclaims liability or warranty for this information). If you have questions about a medical condition or this instruction, always ask your healthcare professional. [address] any warranty or liability for your use of this information. Introducing [facility] & HEALTH SERVICES! Dear Kassie Zeng: Thank you for requesting a Triptease account. Our records indicate that you already have an active Triptease account. You can access your account anytime at [email or web address] Did you know that you can access your hospital and ER discharge instructions at any time in Triptease? You can also review all of your test results from your hospital stay or ER visit. Additional Information If you have questions, please visit the Frequently Asked Questions section of the aWhere website at [email or web address] Remember, aWhere is NOT to be used for urgent needs. For medical emergencies, dial 911. Now available from your iPhone and Android! Please provide this summary of care documentation to your next provider. Your primary care clinician is listed as Jessica Quinonez If you have any questions after today's visit, please call [phone].

## 2018-07-10 NOTE — PATIENT INSTRUCTIONS
APJeTharLavaboom Activation    Thank you for requesting access to Imprivata. Please follow the instructions below to securely access and download your online medical record. Imprivata allows you to send messages to your doctor, view your test results, renew your prescriptions, schedule appointments, and more. How Do I Sign Up? 1. In your internet browser, go to [email or web address]. 2. Click on the First Time User? Click Here link in the Sign In box. You will see the New Member Sign Up page. 3. Enter your Imprivata Access Code exactly as it appears below. You will not need to use this code after youve completed the sign-up process. If you do not sign up before the expiration date, you must request a new code. Imprivata Access Code: Activation code not generated  Current Imprivata Status: Active (This is the date your Imprivata access code will )    4. Enter the last four digits of your Social Security Number (xxxx) and Date of Birth (mm/dd/yyyy) as indicated and click Submit. You will be taken to the next sign-up page. 5. Create a Imprivata ID. This will be your Imprivata login ID and cannot be changed, so think of one that is secure and easy to remember. 6. Create a Imprivata password. You can change your password at any time. 7. Enter your Password Reset Question and Answer. This can be used at a later time if you forget your password. 8. Enter your e-mail address. You will receive e-mail notification when new information is available in [address] 9. Click Sign Up. You can now view and download portions of your medical record. 10. Click the Download Summary menu link to download a portable copy of your medical information. Additional Information    If you have questions, please visit the Frequently Asked Questions section of the Imprivata website at [email or web address] Remember, Imprivata is NOT to be used for urgent needs. For medical emergencies, dial 911. Learning About High Blood Sugar  What is high blood sugar? Your body turns the food you eat into glucose (sugar), which it uses for energy. But if your body isn't able to use the sugar right away, it can build up in your blood and lead to high blood sugar. When the amount of sugar in your blood stays too high for too much of the time, you may have diabetes. Diabetes is a disease that can cause serious health problems. The good news is that lifestyle changes may help you get your blood sugar back to normal and avoid or delay diabetes. What causes high blood sugar? Sugar (glucose) can build up in your blood if you:  · Are overweight. · Have a family history of diabetes. · Take certain medicines, such as steroids. What are the symptoms? Having high blood sugar may not cause any symptoms at all. Or it may make you feel very thirsty or very hungry. You may also urinate more often than usual, have blurry vision, or lose weight without trying. How is high blood sugar treated? You can take steps to lower your blood sugar level if you understand what makes it get higher. Your doctor may want you to learn how to test your blood sugar level at home. Then you can see how illness, stress, or different kinds of food or medicine raise or lower your blood sugar level. Other tests may be needed to see if you have diabetes. How can you prevent high blood sugar? · Watch your weight. If you're overweight, losing just a small amount of weight may help. Reducing fat around your waist is most important. · Limit the amount of calories, sweets, and unhealthy fat you eat. Ask your doctor if a dietitian can help you. A registered dietitian can help you create meal plans that fit your lifestyle. · Get at least 30 minutes of exercise on most days of the week. Exercise helps control your blood sugar. It also helps you maintain a healthy weight. Walking is a good choice.  You also may want to do other activities, such as running, swimming, cycling, or playing tennis or team sports. · If your doctor prescribed medicines, take them exactly as prescribed. Call your doctor if you think you are having a problem with your medicine. You will get more details on the specific medicines your doctor prescribes. Follow-up care is a key part of your treatment and safety. Be sure to make and go to all appointments, and call your doctor if you are having problems. It's also a good idea to know your test results and keep a list of the medicines you take. Where can you learn more? Go to http://hari-cindy.info/. Enter O108 in the search box to learn more about \"Learning About High Blood Sugar. \"  Current as of: December 7, 2017  Content Version: 11.7  © 5307-4820 [employer]. Care instructions adapted under license by Geomerics (which disclaims liability or warranty for this information). If you have questions about a medical condition or this instruction, always ask your healthcare professional. [address] any warranty or liability for your use of this information.

## 2018-07-10 NOTE — PROGRESS NOTES
Subjective:   Tim Lovett III is a 28 y.o. male who presents for HIV follow up. Pt known to me from before  When was first positive test for HIV? September, 2013. [facility]  Previous HIV Negative Test: never  What was the reason for being tested?not feeling well. Initial CD4 >200   Initial VL- dont remember  Mode of HIV infection:Homosexual contact     Date Value   Last CD4 11/9/17 770   Last VL 11/9/17 <20     Patient's usual source of health care:ID Specialist  Oppurtunistic Infections: none  Sexually transmitted diseases: none  Tuberculosis: Any known exposure to MTB  no  Anal Pap: not done   Immunizations by Immunization family     Hep A Vaccine 5/10/2016       Influenza Vaccine 12/3/2013 10/20/2015 3/3/2017     Pneumococcal Polysaccharide (PPSV-23) 3/3/2017       TB Skin Test (PPD) 7/7/2014                   ROS  no specific complaints    No Known Allergies    Current Outpatient Prescriptions   Medication Sig Dispense Refill    elvitegravir-cobicistat-emtricitabine-tenofovir alafenamide (GENVOYA) tab tablet Take 1 Tab by mouth daily. Must be seen before any additional refills. Indications: HIV infection 30 Tab 0    sertraline (ZOLOFT) 50 mg tablet Take 1 Tab by mouth daily. Indications: ANXIETY WITH DEPRESSION 90 Tab 3       Past Medical History:   Diagnosis Date    Asthma     Depression     Depression 8/22/2016    HIV (human immunodeficiency virus infection) ([facility] 12/3/2013       Social History     Social History    Marital status: SINGLE     Spouse name: N/A    Number of children: N/A    Years of education: N/A     Occupational History    Not on file.      Social History Main Topics    Smoking status: Current Some Day Smoker    Smokeless tobacco: Never Used    Alcohol use Yes      Comment: Occasionally    Drug use: No    Sexual activity: Yes     Partners: Male     Other Topics Concern    Not on file     Social History Narrative     Homosexual- yes  Drug used discussed yes  Is the patient sexually active? yes  Safer sex guidelines discussed yes  If yes, partner(s)  aware of patient's status? yes  Psychiatric history. depression and anxiety        Objective:     Visit Vitals    /84 (BP 1 Location: Left arm, BP Patient Position: Sitting)    Pulse (!) 58    Temp 97.2 °F (36.2 °C) (Oral)    Resp 16    Ht 5' 6\" (1.676 m)    Wt 134 lb (60.8 kg)    SpO2 99%    BMI 21.63 kg/m2           General appearance  alert, cooperative, no distress, appears stated age   Head  Normocephalic, without obvious abnormality, atraumatic   Eyes  conjunctivae/corneas clear. PERRL, EOM's intact. Fundi benign   Ears  normal TM's and external ear canals AU   Nose Nares normal. Septum midline. Mucosa normal. No drainage or sinus tenderness. Throat Lips, mucosa, and tongue normal. Teeth and gums normal   Neck supple, symmetrical, trachea midline, no adenopathy, thyroid: not enlarged, symmetric, no tenderness/mass/nodules, no carotid bruit and no JVD   Back   symmetric, no curvature. ROM normal. No CVA tenderness   Lungs   clear to auscultation bilaterally   Chest wall  no tenderness   Heart  regular rate and rhythm, S1, S2 normal, no murmur, click, rub or gallop   Abdomen   soft, non-tender. Bowel sounds normal. No masses,  No organomegaly   Genitalia  deferred   Rectal  deferred   Extremities extremities normal, atraumatic, no cyanosis or edema   Pulses 2+ and symmetric   Skin Skin color, texture, turgor normal. No rashes or lesions   Lymph nodes Cervical, supraclavicular, and axillary nodes normal.   Neurologic Normal       Assessment:       ICD-10-CM ICD-9-CM    1. Encounter for immunization Z23 V03.89 PNEUMOCOCCAL CONJ VACCINE 13 VALENT IM   2.  HIV (human immunodeficiency virus infection) ([facility] B20 V08 CBC WITH AUTOMATED DIFF      METABOLIC PANEL, COMPREHENSIVE      LYMPHOCYTES, CD4 PERCENT AND ABSOLUTE      LIPID PANEL      HEP B SURFACE AB      VITAMIN D, 25 HYDROXY      QUANTIFERON TB GOLD HIV-1 RNA QT BY PCR      RPR W/REFLEX TITER AND TREPONEMA ABS      elvitegravir-cobicistat-emtricitabine-tenofovir alafenamide (GENVOYA) tab tablet   3. Depressed bipolar disorder ([facility] F31.30 296.50    4. Anxiety disorder due to known physiological condition F06.4 300.00    5. Vitamin D deficiency E55.9 268.9 VITAMIN D, 25 HYDROXY   6. Pure hypercholesterolemia E78.00 272.0 LIPID PANEL   7. Elevated blood sugar R73.9 790.29 HEMOGLOBIN A1C WITH EAG         IMPRESSION/ PLAN:   1. HIV well controlled on Genvoya po, last labs  Done on 11/9/17, for repeat labs  2. Anxiety/ depression , continue Sertraline  3. Nausea with Sertraline , take with food  4. Vit D deficiency , take supplement  daily  5. Elevated blood sugar for A1c  6. Hypercholesterolemia for lipid levels  7.   For PCV13 vaccination       Continue present medication(s)Genvoya  Begin medication(s): MVT , Vit D   Continue medication(s): Genvoya  Immunization(s) ordered: PCV   Schedule labs: today  Patient education: safe sex, exercise      Yoel Sandoval MD  [address]

## 2018-11-07 ENCOUNTER — TELEPHONE (OUTPATIENT)
Dept: FAMILY MEDICINE CLINIC | Age: 28
End: 2018-11-07

## 2019-04-15 ENCOUNTER — HOSPITAL ENCOUNTER (EMERGENCY)
Age: 29
Discharge: HOME | End: 2019-04-15
Attending: EMERGENCY MEDICINE
Payer: SUBSIDIZED

## 2019-04-15 ENCOUNTER — APPOINTMENT (OUTPATIENT)
Dept: GENERAL RADIOLOGY | Age: 29
End: 2019-04-15
Attending: PHYSICIAN ASSISTANT
Payer: SUBSIDIZED

## 2019-04-15 VITALS
DIASTOLIC BLOOD PRESSURE: 83 MMHG | RESPIRATION RATE: 16 BRPM | OXYGEN SATURATION: 100 % | SYSTOLIC BLOOD PRESSURE: 104 MMHG | WEIGHT: 136.02 LBS | HEART RATE: 76 BPM | TEMPERATURE: 97.7 F | BODY MASS INDEX: 21.86 KG/M2 | HEIGHT: 66 IN

## 2019-04-15 DIAGNOSIS — R07.89 MUSCULOSKELETAL CHEST PAIN: Primary | ICD-10-CM

## 2019-04-15 LAB
ATRIAL RATE: 65 BPM
CALCULATED P AXIS, ECG09: 76 DEGREES
CALCULATED R AXIS, ECG10: 53 DEGREES
CALCULATED T AXIS, ECG11: 56 DEGREES
DIAGNOSIS, 93000: NORMAL
P-R INTERVAL, ECG05: 162 MS
Q-T INTERVAL, ECG07: 370 MS
QRS DURATION, ECG06: 86 MS
QTC CALCULATION (BEZET), ECG08: 384 MS
TROPONIN I BLD-MCNC: <0.04 NG/ML (ref 0–0.08)
VENTRICULAR RATE, ECG03: 65 BPM

## 2019-04-15 PROCEDURE — 71046 X-RAY EXAM CHEST 2 VIEWS: CPT

## 2019-04-15 PROCEDURE — 84484 ASSAY OF TROPONIN QUANT: CPT

## 2019-04-15 PROCEDURE — 99283 EMERGENCY DEPT VISIT LOW MDM: CPT

## 2019-04-15 PROCEDURE — 93005 ELECTROCARDIOGRAM TRACING: CPT

## 2019-04-15 RX ORDER — NAPROXEN 500 MG/1
500 TABLET ORAL
Qty: 20 TAB | Refills: 0 | Status: SHIPPED | OUTPATIENT
Start: 2019-04-15 | End: 2019-06-11 | Stop reason: ALTCHOICE

## 2019-04-15 RX ORDER — CYCLOBENZAPRINE HCL 10 MG
10 TABLET ORAL
Qty: 20 TAB | Refills: 0 | Status: SHIPPED | OUTPATIENT
Start: 2019-04-15 | End: 2019-06-11 | Stop reason: ALTCHOICE

## 2019-04-15 NOTE — ED NOTES
MARIO Oconnor reviewed discharge instructions with the patient. The patient verbalized understanding. All questions and concerns were addressed. The patient declined a wheelchair and is discharged ambulatory in the care of family members with instructions and prescriptions in hand. Pt is alert and oriented x 4. Respirations are clear and unlabored.

## 2019-04-15 NOTE — LETTER
[address] 
[facility] EMERGENCY DEPT 
[address] [address] 
[phone] Work/School Note Date: 4/15/2019 To Whom It May concern: 
 
Omero Castillo III was seen and treated today in the emergency room by the following provider(s): 
Attending Provider: Dajuan Farr MD 
Physician Assistant: Madison Pham. Please excuse Omero Castillo III from work tomorrow. Sincerely, Madison Barahona

## 2019-04-15 NOTE — ED PROVIDER NOTES
EMERGENCY DEPARTMENT HISTORY AND PHYSICAL EXAM 
 
 
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 No meds pta. No cardiac history.  Back Pain  
  worse with movement. History Provided By: Patient HPI: Iris Frausto III, 33 y.o. male presents ambulatory to the ED with cc of left upper chest pain that radiates to the left scapula. The pain is mild and constant. He notes that the pain started about 2 weeks ago after lifting a patient at the [employer] facility where he works. He has taken a muscle relaxer and tylenol without releif. He has also applied heat without relief. There are no other complaints, changes, or physical findings at this time. Social Hx: Tobacco (black and milds; 1 per day), EtOH (socially), Illicit drug use (denies) PCP: Zulay Mccall, DO No current facility-administered medications on file prior to encounter. Current Outpatient Medications on File Prior to Encounter Medication Sig Dispense Refill  elvitegravir-cobicistat-emtricitabine-tenofovir alafenamide (GENVOYA) tab tablet Take 1 Tab by mouth daily. Must be seen before any additional refills. Indications: HIV infection 30 Tab 6  sertraline (ZOLOFT) 50 mg tablet Take 1 Tab by mouth daily. Indications: ANXIETY WITH DEPRESSION 90 Tab 3 Past History Past Medical History: 
Past Medical History:  
Diagnosis Date  Asthma  Depression  Depression 8/22/2016  
 HIV (human immunodeficiency virus infection) ([facility] [address] 12/3/2013 Past Surgical History: 
Past Surgical History:  
Procedure Laterality Date  HX WISDOM TEETH EXTRACTION Family History: 
Family History Problem Relation Age of Onset  Hypertension Mother  Diabetes Mother  Depression Mother  Depression Father  Hypertension Father Social History: 
Social History Tobacco Use  Smoking status: Current Some Day Smoker  Smokeless tobacco: Never Used  Tobacco comment: black and mild Substance Use Topics  Alcohol use: Yes Comment: Occasionally  Drug use: No  
 
 
Allergies: 
No Known Allergies Review of Systems Review of Systems Constitutional: Negative for chills, diaphoresis and fever. HENT: Negative for congestion, ear pain, rhinorrhea and sore throat. Respiratory: Negative for cough and shortness of breath. Cardiovascular: Positive for chest pain. Gastrointestinal: Negative for abdominal pain, constipation, diarrhea, nausea and vomiting. Genitourinary: Negative for difficulty urinating, dysuria, frequency and hematuria. Musculoskeletal: Positive for back pain. Negative for arthralgias and myalgias. Neurological: Negative for headaches. All other systems reviewed and are negative. Physical Exam  
Physical Exam  
Constitutional: He is oriented to person, place, and time. He appears well-developed and well-nourished. No distress. 33 y.o. -American male HENT:  
Head: Normocephalic and atraumatic. Eyes: Conjunctivae are normal. Right eye exhibits no discharge. Left eye exhibits no discharge. Neck: Normal range of motion and full passive range of motion without pain. Neck supple. No spinous process tenderness and no muscular tenderness present. No neck rigidity. Normal range of motion present. Cardiovascular: Normal rate, regular rhythm and normal heart sounds. No murmur heard. Pulmonary/Chest: Effort normal and breath sounds normal. No respiratory distress. He exhibits no tenderness. Abdominal: Soft. Bowel sounds are normal. He exhibits no distension. There is no tenderness. There is no rebound and no guarding. Musculoskeletal: BACK: Normal spinal curvatures. No step off or deformity. NT to palpation. Negative seated SLR bilaterally. Strength of the LE 5/5 and equal bilaterally. Patellar DTR's 2+ bilaterally. Ambulatory without difficulty. Lymphadenopathy:  
  He has no cervical adenopathy. Neurological: He is alert and oriented to person, place, and time. Skin: Skin is warm and dry. He is not diaphoretic. Psychiatric: He has a normal mood and affect. His behavior is normal.  
Nursing note and vitals reviewed. Diagnostic Study Results Labs - Recent Results (from the past 12 hour(s)) EKG, 12 LEAD, INITIAL Collection Time: 04/15/19  8:56 AM  
Result Value Ref Range Ventricular Rate 65 BPM  
 Atrial Rate 65 BPM  
 P-R Interval 162 ms QRS Duration 86 ms  
 Q-T Interval 370 ms QTC Calculation (Bezet) 384 ms Calculated P Axis 76 degrees Calculated R Axis 53 degrees Calculated T Axis 56 degrees Diagnosis Normal sinus rhythm Normal ECG When compared with ECG of 19-AUG-2011 16:30, No significant change was found POC TROPONIN-I Collection Time: 04/15/19  8:57 AM  
Result Value Ref Range Troponin-I (POC) <0.04 0.00 - 0.08 ng/mL Radiologic Studies - CXR Results  (Last 48 hours) 04/15/19 0823  XR CHEST PA LAT Final result Impression:  IMPRESSION: No acute cardiopulmonary disease. Narrative: Indication: Upper chest pain for 2 weeks. Exam: PA and lateral views of the chest.  
   
Direct comparison is made to prior CXR dated October 2015. Findings: Cardiomediastinal silhouette is within normal limits. Lungs are clear  
bilaterally. Pleural spaces are normal. Osseous structures are intact. Medical Decision Making I am the first provider for this patient. I reviewed the vital signs, available nursing notes, past medical history, past surgical history, family history and social history. Vital Signs-Reviewed the patient's vital signs. Patient Vitals for the past 12 hrs: 
 Temp Pulse Resp BP SpO2  
04/15/19 0711 97.7 °F (36.5 °C) 76 16 104/83 100 % Records Reviewed: Nursing Notes Provider Notes (Medical Decision Making): Typical chest pain, sprain, strain, acute coronary syndrome, DDD, DJD, rotator cuff injury ED Course:  
Initial assessment performed. The patients presenting problems have been discussed, and they are in agreement with the care plan formulated and outlined with them. I have encouraged them to ask questions as they arise throughout their visit. Critical Care Time: None Disposition: 
DISCHARGE NOTE: 
9:29 AM 
The pt is ready for discharge. The pt's signs, symptoms, diagnosis, and discharge instructions have been discussed and pt has conveyed their understanding. The pt is to follow up as recommended or return to ER should their symptoms worsen. Plan has been discussed and pt is in agreement. Tobacco Counseling Discussed the risks of smoking and the benefits of smoking cessation as well as the long term sequelae of smoking with the patient. The patient verbalized their understanding. Counseled patient for approximately 3 minutes. PLAN: 
1. Current Discharge Medication List  
  
START taking these medications Details  
cyclobenzaprine (FLEXERIL) 10 mg tablet Take 1 Tab by mouth three (3) times daily as needed for Muscle Spasm(s). Qty: 20 Tab, Refills: 0  
  
naproxen (NAPROSYN) 500 mg tablet Take 1 Tab by mouth every twelve (12) hours as needed for Pain. Qty: 20 Tab, Refills: 0 CONTINUE these medications which have NOT CHANGED Details  
elvitegravir-cobicistat-emtricitabine-tenofovir alafenamide (GENVOYA) tab tablet Take 1 Tab by mouth daily. Must be seen before any additional refills. Indications: HIV infection 
Qty: 30 Tab, Refills: 6 Associated Diagnoses: [facility] sertraline (ZOLOFT) 50 mg tablet Take 1 Tab by mouth daily. Indications: ANXIETY WITH DEPRESSION Qty: 90 Tab, Refills: 3 2. Follow-up Information Follow up With Specialties Details Why Contact Anahi Zhong, DO Internal Medicine In 1 week  [address] [place] [address] 
[phone] 3. Stop smoking. Return to ED if worse Diagnosis Clinical Impression: 1. Musculoskeletal chest pain Please note that this dictation was completed with [employer], the computer voice recognition software. Quite often unanticipated grammatical, syntax, homophones, and other interpretive errors are inadvertently transcribed by the computer software. Please disregards these errors. Please excuse any errors that have escaped final proofreading.  
 
8:06 AM 
I was personally available for consultation in the emergency department. I have reviewed the chart and agree with the documentation recorded by the [facility], including the assessment, treatment plan, and disposition. Marylu Weiss MD 
 
 
This note will not be viewable in [address]

## 2019-04-15 NOTE — DISCHARGE INSTRUCTIONS

## 2019-04-15 NOTE — PROGRESS NOTES
11:56 AM 
Patient's pharmacy called to the ED noting that they are unable to fill these prescriptions due to the formulary/agreement they have with the patient and his insurance. They are requesting that Rx medications be sent to an outside pharmacy. They also note that the patient will have to pay for these prescriptions out of pocket. Attempted to call and discuss further with the patient but there was no answer on his phone. Left HIPPA compliant voicemail for the patient to return phone call to the ED. 
 
9:02 AM 
I was personally available for consultation in the emergency department. I have reviewed the chart and agree with the documentation recorded by the [facility], including the assessment, treatment plan, and disposition.  
Christina Godinez MD

## 2019-05-28 ENCOUNTER — TELEPHONE (OUTPATIENT)
Dept: FAMILY MEDICINE CLINIC | Age: 29
End: 2019-05-28

## 2019-05-28 DIAGNOSIS — B20 HIV (HUMAN IMMUNODEFICIENCY VIRUS INFECTION) (HCC): ICD-10-CM

## 2019-05-28 NOTE — TELEPHONE ENCOUNTER
Epi Guadalupe with the health department called. Requested a script for patient's genvoya. Informed the nurse patient would have to call the office. Patient has not been seen in 1 year. Medication were last sent to pharmacy 7/10/18 for a 6mo supply. Patient was due to F/U 11/2018. Epi Guadalupe states she will reach out to patient to have patient contact office.      Message being sent to MD

## 2019-05-29 NOTE — TELEPHONE ENCOUNTER
Patient called office. Made patient an apmt for f/u on 6/11/19. Informed patient he is due for labs as well, patient should come to office prior to his apmt  To have labs completed. Per Dr. Rafaela Sun patient is to complete HIV-RNA, CD4, CBC, and CMP labs.  Medications will be sent to pharmacy per Dr. Rafaela Sun

## 2019-05-31 LAB
ALBUMIN SERPL-MCNC: 4.6 G/DL (ref 3.5–5.5)
ALBUMIN/GLOB SERPL: 1.8 {RATIO} (ref 1.2–2.2)
ALP SERPL-CCNC: 82 IU/L (ref 39–117)
ALT SERPL-CCNC: 17 IU/L (ref 0–44)
AST SERPL-CCNC: 20 IU/L (ref 0–40)
BASOPHILS # BLD AUTO: 0 X10E3/UL (ref 0–0.2)
BASOPHILS NFR BLD AUTO: 0 %
BILIRUB SERPL-MCNC: <0.2 MG/DL (ref 0–1.2)
BUN SERPL-MCNC: 7 MG/DL (ref 6–20)
BUN/CREAT SERPL: 7 (ref 9–20)
CALCIUM SERPL-MCNC: 9.3 MG/DL (ref 8.7–10.2)
CD3+CD4+ CELLS # BLD: 1028 /UL (ref 359–1519)
CD3+CD4+ CELLS NFR BLD: 41.1 % (ref 30.8–58.5)
CHLORIDE SERPL-SCNC: 104 MMOL/L (ref 96–106)
CO2 SERPL-SCNC: 24 MMOL/L (ref 20–29)
CREAT SERPL-MCNC: 1.01 MG/DL (ref 0.76–1.27)
EOSINOPHIL # BLD AUTO: 0 X10E3/UL (ref 0–0.4)
EOSINOPHIL NFR BLD AUTO: 1 %
ERYTHROCYTE [DISTWIDTH] IN BLOOD BY AUTOMATED COUNT: 14.6 % (ref 12.3–15.4)
GLOBULIN SER CALC-MCNC: 2.5 G/DL (ref 1.5–4.5)
GLUCOSE SERPL-MCNC: 86 MG/DL (ref 65–99)
HCT VFR BLD AUTO: 37.8 % (ref 37.5–51)
HGB BLD-MCNC: 13 G/DL (ref 13–17.7)
HIV1 RNA # SERPL NAA+PROBE: <20 COPIES/ML
HIV1 RNA SERPL NAA+PROBE-LOG#: NORMAL LOG10COPY/ML
IMM GRANULOCYTES # BLD AUTO: 0 X10E3/UL (ref 0–0.1)
IMM GRANULOCYTES NFR BLD AUTO: 0 %
LYMPHOCYTES # BLD AUTO: 2.5 X10E3/UL (ref 0.7–3.1)
LYMPHOCYTES NFR BLD AUTO: 53 %
MCH RBC QN AUTO: 32.5 PG (ref 26.6–33)
MCHC RBC AUTO-ENTMCNC: 34.4 G/DL (ref 31.5–35.7)
MCV RBC AUTO: 95 FL (ref 79–97)
MONOCYTES # BLD AUTO: 0.2 X10E3/UL (ref 0.1–0.9)
MONOCYTES NFR BLD AUTO: 4 %
NEUTROPHILS # BLD AUTO: 2 X10E3/UL (ref 1.4–7)
NEUTROPHILS NFR BLD AUTO: 42 %
PLATELET # BLD AUTO: 190 X10E3/UL (ref 150–450)
POTASSIUM SERPL-SCNC: 3.8 MMOL/L (ref 3.5–5.2)
PROT SERPL-MCNC: 7.1 G/DL (ref 6–8.5)
RBC # BLD AUTO: 4 X10E6/UL (ref 4.14–5.8)
SODIUM SERPL-SCNC: 144 MMOL/L (ref 134–144)
WBC # BLD AUTO: 4.7 X10E3/UL (ref 3.4–10.8)

## 2019-06-11 ENCOUNTER — OFFICE VISIT (OUTPATIENT)
Dept: FAMILY MEDICINE CLINIC | Age: 29
End: 2019-06-11

## 2019-06-11 VITALS
RESPIRATION RATE: 14 BRPM | BODY MASS INDEX: 23.14 KG/M2 | WEIGHT: 144 LBS | DIASTOLIC BLOOD PRESSURE: 80 MMHG | TEMPERATURE: 98.1 F | OXYGEN SATURATION: 99 % | HEIGHT: 66 IN | SYSTOLIC BLOOD PRESSURE: 108 MMHG | HEART RATE: 67 BPM

## 2019-06-11 DIAGNOSIS — Z21 ASYMPTOMATIC HIV INFECTION (HCC): Primary | ICD-10-CM

## 2019-06-11 DIAGNOSIS — R73.9 ELEVATED BLOOD SUGAR: ICD-10-CM

## 2019-06-11 DIAGNOSIS — E55.9 VITAMIN D DEFICIENCY: ICD-10-CM

## 2019-06-11 DIAGNOSIS — Z23 ENCOUNTER FOR IMMUNIZATION: ICD-10-CM

## 2019-06-11 DIAGNOSIS — F06.4 ANXIETY DISORDER DUE TO KNOWN PHYSIOLOGICAL CONDITION: ICD-10-CM

## 2019-06-11 DIAGNOSIS — E78.00 PURE HYPERCHOLESTEROLEMIA: ICD-10-CM

## 2019-06-11 DIAGNOSIS — F31.9 DEPRESSED BIPOLAR DISORDER (HCC): ICD-10-CM

## 2019-06-11 NOTE — PROGRESS NOTES
Subjective:   Katelyn Bennett III is a 33 y.o. male who presents for HIV follow up. Pt known to me from before   Pt 100 per cent compliant with Genvoya  . In stable , committed relationship . Partner is negative . They use condoms . Partner does not want to do PrEP    When was first positive test for HIV? September, 2013. [facility]  Previous HIV Negative Test: never  What was the reason for being tested?not feeling well. Initial CD4 >200   Initial VL- dont remember  Mode of HIV infection:Homosexual contact     Date Value   Last CD4 5/29/19 1028   Last VL 5/29/19 <20     Patient's usual source of health care:ID Specialist  Oppurtunistic Infections: none  Sexually transmitted diseases: none  Tuberculosis: Any known exposure to MTB  no  Anal Pap: not done   Immunizations by Immunization family     Hep A Vaccine 5/10/2016       Influenza Vaccine 12/3/2013 10/20/2015 3/3/2017     Pneumococcal Conjugate (PCV) 7/12/2018       Pneumococcal Polysaccharide (PPSV-23) 3/3/2017       TB Skin Test (PPD) Intradermal 7/7/2014           ROS  no specific complaints   Pt agreeable to meningo coccal vaccine    No Known Allergies    Current Outpatient Medications   Medication Sig Dispense Refill    elvitegravir-cobicistat-emtricitabine-tenofovir alafenamide (GENVOYA) tab tablet Take 1 Tab by mouth daily. Must be seen before any additional refills. Indications: HIV 30 Tab 0    sertraline (ZOLOFT) 50 mg tablet Take 1 Tab by mouth daily.  Indications: ANXIETY WITH DEPRESSION 90 Tab 3       Past Medical History:   Diagnosis Date    Asthma     Depression     Depression 8/22/2016    [facility]       Social History     Socioeconomic History    Marital status: SINGLE     Spouse name: Not on file    Number of children: Not on file    Years of education: Not on file    Highest education level: Not on file   Occupational History    Not on file   Social Needs    Financial resource strain: Not on file    Food insecurity:     Worry: Not on file     Inability: Not on file    Transportation needs:     Medical: Not on file     Non-medical: Not on file   Tobacco Use    Smoking status: Current Some Day Smoker    Smokeless tobacco: Never Used    Tobacco comment: black and mild   Substance and Sexual Activity    Alcohol use: Yes     Comment: Occasionally    Drug use: No    Sexual activity: Yes     Partners: Male   Lifestyle    Physical activity:     Days per week: Not on file     Minutes per session: Not on file    Stress: Not on file   Relationships    Social connections:     Talks on phone: Not on file     Gets together: Not on file     Attends Shinto service: Not on file     Active member of club or organization: Not on file     Attends meetings of clubs or organizations: Not on file     Relationship status: Not on file    Intimate partner violence:     Fear of current or ex partner: Not on file     Emotionally abused: Not on file     Physically abused: Not on file     Forced sexual activity: Not on file   Other Topics Concern    Not on file   Social History Narrative    Not on file     Homosexual- yes  Drug used discussed yes  Is the patient sexually active? yes  Safer sex guidelines discussed yes  If yes, partner(s)  aware of patient's status? yes  Psychiatric history. depression and anxiety        Objective:     Visit Vitals  /80 (BP 1 Location: Right arm, BP Patient Position: Sitting)   Pulse 67   Temp 98.1 °F (36.7 °C) (Oral)   Resp 14   Ht 5' 6\" (1.676 m)   Wt 144 lb (65.3 kg)   SpO2 99%   BMI 23.24 kg/m²           General appearance  alert, cooperative, no distress, appears stated age   Head  Normocephalic, without obvious abnormality, atraumatic   Eyes  conjunctivae/corneas clear. PERRL, EOM's intact. Fundi benign   Ears  normal TM's and external ear canals AU   Nose Nares normal. Septum midline. Mucosa normal. No drainage or sinus tenderness. Throat Lips, mucosa, and tongue normal. Teeth and gums normal   Neck supple, symmetrical, trachea midline, no adenopathy, thyroid: not enlarged, symmetric, no tenderness/mass/nodules, no carotid bruit and no JVD   Back   symmetric, no curvature. ROM normal. No CVA tenderness   Lungs   clear to auscultation bilaterally   Chest wall  no tenderness   Heart  regular rate and rhythm, S1, S2 normal, no murmur, click, rub or gallop   Abdomen   soft, non-tender. Bowel sounds normal. No masses,  No organomegaly   Genitalia  deferred   Rectal  deferred   Extremities extremities normal, atraumatic, no cyanosis or edema   Pulses 2+ and symmetric   Skin Skin color, texture, turgor normal. No rashes or lesions   Lymph nodes Cervical, supraclavicular, and axillary nodes normal.   Neurologic Normal       Assessment:       ICD-10-CM ICD-9-CM    1. Asymptomatic HIV infection ([facility] Z21 V08    2. Depressed bipolar disorder ([facility] F31.9 296.50    3. Anxiety disorder due to known physiological condition F06.4 300.00    4. Vitamin D deficiency E55.9 268.9    5. Pure hypercholesterolemia E78.00 272.0    6. Elevated blood sugar R73.9 790.29          IMPRESSION/ PLAN:   1. HIV well controlled on Genvoya po, CD4/ VL   1028/ <20  2. Anxiety/ depression well under control, continue Sertraline  3. Nausea with Sertraline , take with food  4. Vit D deficiency , take supplement  daily  5. Elevated blood sugar for A1c  6. Hypercholesterolemia for lipid levels  7.  For Menveo 1st dose today       Continue present medication(s)Genvoya  Begin medication(s): MVT , Vit D   Continue medication(s): Genvoya  Immunization(s) ordered: Menveo  Schedule labs: 6months  Patient education: safe sex, exercise      Lizette MacedoanMD [place]

## 2019-06-11 NOTE — LETTER
6/11/2019 12:53 PM 
 
Mr. Elan Ash III 
[address] [facility] [id number] Results for orders placed or performed in visit on 05/28/19 LYMPHOCYTES, CD4 PERCENT AND ABSOLUTE Result Value Ref Range Abs CD4 [place] 1,028 359 - 1,519 /uL  
 % CD 4 Pos Lymph 41.1 30.8 - 58.5 % WBC 4.7 3.4 - 10.8 x10E3/uL  
 RBC 4.00 (L) 4.14 - 5.80 x10E6/uL HGB 13.0 13.0 - 17.7 g/dL HCT 37.8 37.5 - 51.0 % MCV 95 79 - 97 fL  
 MCH 32.5 26.6 - 33.0 pg  
 MCHC 34.4 31.5 - 35.7 g/dL  
 RDW 14.6 12.3 - 15.4 % PLATELET 427 872 - 043 x10E3/uL NEUTROPHILS 42 Not Estab. % Lymphocytes 53 Not Estab. % MONOCYTES 4 Not Estab. % EOSINOPHILS 1 Not Estab. % BASOPHILS 0 Not Estab. %  
 ABS. NEUTROPHILS 2.0 1.4 - 7.0 x10E3/uL Abs Lymphocytes 2.5 0.7 - 3.1 x10E3/uL  
 ABS. MONOCYTES 0.2 0.1 - 0.9 x10E3/uL  
 ABS. EOSINOPHILS 0.0 0.0 - 0.4 x10E3/uL  
 ABS. BASOPHILS 0.0 0.0 - 0.2 x10E3/uL IMMATURE GRANULOCYTES 0 Not Estab. %  
 ABS. IMM. GRANS. 0.0 0.0 - 0.1 x10E3/uL METABOLIC PANEL, COMPREHENSIVE Result Value Ref Range Glucose 86 65 - 99 mg/dL BUN 7 6 - 20 mg/dL Creatinine 1.01 0.76 - 1.27 mg/dL GFR est non- >59 mL/min/1.73 GFR est  >59 mL/min/1.73  
 BUN/Creatinine ratio 7 (L) 9 - 20 Sodium 144 134 - 144 mmol/L Potassium 3.8 3.5 - 5.2 mmol/L Chloride 104 96 - 106 mmol/L  
 CO2 24 20 - 29 mmol/L Calcium 9.3 8.7 - 10.2 mg/dL Protein, total 7.1 6.0 - 8.5 g/dL Albumin 4.6 3.5 - 5.5 g/dL GLOBULIN, TOTAL 2.5 1.5 - 4.5 g/dL A-G Ratio 1.8 1.2 - 2.2 Bilirubin, total <0.2 0.0 - 1.2 mg/dL Alk. phosphatase 82 39 - 117 IU/L  
 AST (SGOT) 20 0 - 40 IU/L  
 ALT (SGPT) 17 0 - 44 IU/L  
HIV-1 RNA QT BY PCR Result Value Ref Range HIV-1 RNA by PCR <20 copies/mL HIV-1 RNA log10 Copies/mL CANCELED [id number] Sincerely, Puja Gonzales MD

## 2019-06-11 NOTE — PATIENT INSTRUCTIONS
[facility] [employer] Activation Thank you for requesting access to [facility]. Please follow the instructions below to securely access and download your online medical record. [facility] allows you to send messages to your doctor, view your test results, renew your prescriptions, schedule appointments, and more. How Do I Sign Up? 1. In your internet browser, go to [email or web address]. 2. Click on the First Time User? Click Here link in the Sign In box. You will see the New Member Sign Up page. 3. Enter your [facility] Access Code exactly as it appears below. You will not need to use this code after youve completed the sign-up process. If you do not sign up before the expiration date, you must request a new code. [facility] Access Code: Activation code not generated Current [facility] Status: Active (This is the date your [facility] access code will ) 4. Enter the last four digits of your Social Security Number (xxxx) and Date of Birth (mm/dd/yyyy) as indicated and click Submit. You will be taken to the next sign-up page. 5. Create a [facility] ID. This will be your [facility] login ID and cannot be changed, so think of one that is secure and easy to remember. 6. Create a [facility] password. You can change your password at any time. 7. Enter your Password Reset Question and Answer. This can be used at a later time if you forget your password. 8. Enter your e-mail address. You will receive e-mail notification when new information is available in [address] 9. Click Sign Up. You can now view and download portions of your medical record. 10. Click the Download Summary menu link to download a portable copy of your medical information. Additional Information If you have questions, please visit the Frequently Asked Questions section of the [facility] website at [email or web address] Remember, [facility] is NOT to be used for urgent needs. For medical emergencies, dial 911.

## 2019-06-12 ENCOUNTER — HOSPITAL ENCOUNTER (EMERGENCY)
Age: 29
Discharge: HOME | End: 2019-06-12
Attending: EMERGENCY MEDICINE
Payer: SUBSIDIZED

## 2019-06-12 VITALS
BODY MASS INDEX: 22.64 KG/M2 | DIASTOLIC BLOOD PRESSURE: 90 MMHG | OXYGEN SATURATION: 100 % | HEIGHT: 66 IN | SYSTOLIC BLOOD PRESSURE: 127 MMHG | WEIGHT: 140.87 LBS | RESPIRATION RATE: 14 BRPM | HEART RATE: 68 BPM | TEMPERATURE: 98.2 F

## 2019-06-12 DIAGNOSIS — R51.9 ACUTE INTRACTABLE HEADACHE, UNSPECIFIED HEADACHE TYPE: Primary | ICD-10-CM

## 2019-06-12 PROCEDURE — 74011250637 HC RX REV CODE- 250/637: Performed by: PHYSICIAN ASSISTANT

## 2019-06-12 PROCEDURE — 99283 EMERGENCY DEPT VISIT LOW MDM: CPT

## 2019-06-12 RX ORDER — BUTALBITAL, ACETAMINOPHEN AND CAFFEINE 50; 325; 40 MG/1; MG/1; MG/1
1 TABLET ORAL
Status: COMPLETED | OUTPATIENT
Start: 2019-06-12 | End: 2019-06-12

## 2019-06-12 RX ORDER — PROMETHAZINE HYDROCHLORIDE 25 MG/1
25 TABLET ORAL
Status: COMPLETED | OUTPATIENT
Start: 2019-06-12 | End: 2019-06-12

## 2019-06-12 RX ORDER — PROMETHAZINE HYDROCHLORIDE 25 MG/1
25 TABLET ORAL
Qty: 12 TAB | Refills: 0 | Status: SHIPPED | OUTPATIENT
Start: 2019-06-12 | End: 2019-12-10 | Stop reason: ALTCHOICE

## 2019-06-12 RX ORDER — BUTALBITAL, ACETAMINOPHEN AND CAFFEINE 300; 40; 50 MG/1; MG/1; MG/1
1 CAPSULE ORAL
Qty: 12 CAP | Refills: 0 | Status: SHIPPED | OUTPATIENT
Start: 2019-06-12 | End: 2021-03-23 | Stop reason: ALTCHOICE

## 2019-06-12 RX ADMIN — BUTALBITAL, ACETAMINOPHEN AND CAFFEINE 1 TABLET: 50; 325; 40 TABLET ORAL at 12:14

## 2019-06-12 RX ADMIN — PROMETHAZINE HYDROCHLORIDE 25 MG: 25 TABLET ORAL at 12:14

## 2019-06-12 NOTE — LETTER
[address] 
[facility] EMERGENCY DEPT 
[address] [place] [address] [address] [id number] 
[phone] Work/School Note Date: 6/12/2019 To Whom It May concern: 
 
Tim Lovett III was seen and treated today in the emergency room. He may return to work in 1 to 2 days, as symptoms improve. Sincerely, Cayetano Lópezma

## 2019-06-12 NOTE — ED NOTES
Patient present to the ED ambulatory complaining of a headache and chills. Patient reports receiving a Menveo vaccine yesterday and the headache started after the vaccine. Patient denies dizziness, nausea, vomiting or vision changes. Patient reports the headache was worst yesterday and he was sensitive to light and noise. Call bell provided to patient.

## 2019-06-12 NOTE — DISCHARGE INSTRUCTIONS
Rest, push fluids, follow up with primary care for recheck. Return to the Emergency Dept for any continued/worsening headache, confusion, nausea/vomiting, fever, or rash.

## 2019-06-12 NOTE — ED NOTES
Madison Tomas reviewed discharge instructions with the patient. The patient verbalized understanding. Patient discharged home with stable vitals. Patient out of ED ambulatory with discharge instructions in hand. Opportunity for questions and clarification provided. No further complaints noted at this time.

## 2019-06-13 NOTE — ED PROVIDER NOTES
EMERGENCY DEPARTMENT HISTORY AND PHYSICAL EXAM      Date: 6/12/2019  Patient Name: Iris Frausto III    History of Presenting Illness     Chief Complaint   Patient presents with    Headache     Patient states he had meningitis shot yesterday and has been dealing with migraines since. Denies n/v and photosensitivity. History Provided By: Patient    HPI: Sallyann Apley, 33 y.o. male presents ambulatory to the Emergency Dept with c/o intractable headache x 1 day. Pt states he received a meningitis vaccine on 6/11/19 and has had a moderate headache since that time. He denied h/o headache. No photophobia. He denied sinus congestion/pressure. No recent head injury. No fever/chills. He denied ill contacts. No N/V/D. He denied weakness, confusion, visual changes, or problems with speech/ambulation. Chief Complaint: headache  Duration: 1 Days  Timing:  Acute  Location: diffuse headache  Quality: Pressure  Severity: Moderate  Modifying Factors: no exacerbating/alleviating features  Associated Symptoms: he denied nausea, vomiting, or photosensitivity        There are no other complaints, changes, or physical findings at this time. PCP: Zulay Mccall, DO    Current Outpatient Medications   Medication Sig Dispense Refill    butalbital-acetaminophen-caff (FIORICET) -40 mg per capsule Take 1 Cap by mouth every six (6) hours as needed for Headache for up to 12 doses. 12 Cap 0    promethazine (PHENERGAN) 25 mg tablet Take 1 Tab by mouth every six (6) hours as needed for Nausea for up to 12 doses. 12 Tab 0    elvitegravir-cobicistat-emtricitabine-tenofovir alafenamide (GENVOYA) tab tablet Take 1 Tab by mouth daily. Must be seen before any additional refills. Indications: HIV 30 Tab 0    sertraline (ZOLOFT) 50 mg tablet Take 1 Tab by mouth daily.  Indications: ANXIETY WITH DEPRESSION 90 Tab 3       Past History     Past Medical History:  Past Medical History:   Diagnosis Date    Asthma     Depression     Depression 8/22/2016    HIV (human immunodeficiency virus infection) ([facility] [address] 12/3/2013       Past Surgical History:  Past Surgical History:   Procedure Laterality Date    HX WISDOM TEETH EXTRACTION         Family History:  Family History   Problem Relation Age of Onset    Hypertension Mother     Diabetes Mother     Depression Mother    Funes Depression Father     Hypertension Father        Social History:  Social History     Tobacco Use    Smoking status: Current Some Day Smoker    Smokeless tobacco: Never Used    Tobacco comment: black and mild   Substance Use Topics    Alcohol use: Yes     Comment: Occasionally    Drug use: No       Allergies:  No Known Allergies      Review of Systems   Review of Systems   Constitutional: Negative for chills and fever. HENT: Negative for congestion, rhinorrhea and sore throat. Eyes: Negative for photophobia and visual disturbance. Respiratory: Negative for cough and shortness of breath. Cardiovascular: Negative for chest pain and palpitations. Gastrointestinal: Negative for abdominal pain, diarrhea, nausea and vomiting. Endocrine: Negative for polydipsia, polyphagia and polyuria. Genitourinary: Negative for dysuria and hematuria. Musculoskeletal: Negative for neck pain and neck stiffness. Skin: Negative for rash and wound. Allergic/Immunologic: Negative for food allergies and immunocompromised state. Neurological: Positive for headaches. Negative for dizziness, weakness and light-headedness. Hematological: Negative for adenopathy. Does not bruise/bleed easily. Psychiatric/Behavioral: Negative for agitation and confusion. Physical Exam   Physical Exam   Constitutional: He is oriented to person, place, and time. He appears well-developed and well-nourished. No distress. WDWN male, alert, in NAD   HENT:   Head: Normocephalic and atraumatic.    Nose: Nose normal.   Mouth/Throat: Oropharynx is clear and moist. No oropharyngeal exudate. Eyes: Pupils are equal, round, and reactive to light. Conjunctivae and EOM are normal. Right eye exhibits no discharge. Left eye exhibits no discharge. No scleral icterus. Neck: Normal range of motion. Neck supple. No JVD present. No tracheal deviation present. No thyromegaly present. No nuchal rigidity   Cardiovascular: Normal rate, regular rhythm and normal heart sounds. Pulmonary/Chest: Effort normal and breath sounds normal. No respiratory distress. He has no wheezes. Abdominal: Soft. There is no tenderness. Musculoskeletal: Normal range of motion. He exhibits no edema. Lymphadenopathy:     He has no cervical adenopathy. Neurological: He is alert and oriented to person, place, and time. No cranial nerve deficit. He exhibits normal muscle tone. Coordination normal.   FNF intact, no pronator drift,  5/5 bilat   Skin: Skin is warm and dry. No rash noted. He is not diaphoretic. No erythema. No pallor. Psychiatric: He has a normal mood and affect. His behavior is normal. Judgment normal.   Nursing note and vitals reviewed. Diagnostic Study Results     Labs -   No results found for this or any previous visit (from the past 12 hour(s)). Radiologic Studies -   No orders to display         Medical Decision Making   I am the first provider for this patient. I reviewed the vital signs, available nursing notes, past medical history, past surgical history, family history and social history. Vital Signs-Reviewed the patient's vital signs. No data found. Records Reviewed: Nursing Notes, Old Medical Records, Previous Radiology Studies and Previous Laboratory Studies    Provider Notes (Medical Decision Making):   Tension headache, atypical migraine, medication side effect    ED Course:   Initial assessment performed. The patients presenting problems have been discussed, and they are in agreement with the care plan formulated and outlined with them.   I have encouraged them to ask questions as they arise throughout their visit. DISCHARGE NOTE:  The care plan has been outline with the patient and/or family, who verbally conveyed understanding and agreement. Available results have been reviewed. Patient and/or family understand the follow up plan as outlined and discharge instructions. Should their condition deterioration at any time after discharge the patient agrees to return, follow up sooner than outlined or seek medical assistance at the closest Emergency Room as soon as possible. Questions have been answered. Discharge instructions and educational information regarding the patient's diagnosis as well a list of reasons why the patient would want to seek immediate medical attention, should their condition change, were reviewed directly with the patient/family       PLAN:  1. Discharge Medication List as of 6/12/2019 12:47 PM      START taking these medications    Details   butalbital-acetaminophen-caff (FIORICET) -40 mg per capsule Take 1 Cap by mouth every six (6) hours as needed for Headache for up to 12 doses. , Print, Disp-12 Cap, R-0      promethazine (PHENERGAN) 25 mg tablet Take 1 Tab by mouth every six (6) hours as needed for Nausea for up to 12 doses. , Print, Disp-12 Tab, R-0         CONTINUE these medications which have NOT CHANGED    Details   elvitegravir-cobicistat-emtricitabine-tenofovir alafenamide (GENVOYA) tab tablet Take 1 Tab by mouth daily. Must be seen before any additional refills. Indications: HIV, Normal, Disp-30 Tab, R-0      sertraline (ZOLOFT) 50 mg tablet Take 1 Tab by mouth daily. Indications: ANXIETY WITH DEPRESSION, Normal, Disp-90 Tab, R-3           2.    Follow-up Information     Follow up With Specialties Details Why Contact Info    Carly Mosquera, DO Internal Medicine   [address] [facility]  [phone]      [facility] EMERGENCY DEPT Emergency Medicine  If symptoms worsen [address] [place] [address]  [phone]        Return to ED if worse     Diagnosis     Clinical Impression:   1.  Acute intractable headache, unspecified headache type

## 2019-07-15 DIAGNOSIS — B20 HIV (HUMAN IMMUNODEFICIENCY VIRUS INFECTION) (HCC): ICD-10-CM

## 2019-07-15 NOTE — TELEPHONE ENCOUNTER
Pt requests refill for   elvitegravir-cobicistat-emtricitabine-tenofovir alafenamide ([facility]) tab      [phone]

## 2019-07-15 NOTE — TELEPHONE ENCOUNTER
Request for genvoya every day. Last office visit 6/11/19, next office visit 12/10/19.  Refill pending for provider approval.

## 2019-07-16 RX ORDER — SERTRALINE HYDROCHLORIDE 50 MG/1
50 TABLET, FILM COATED ORAL DAILY
Qty: 90 TAB | Refills: 3 | Status: SHIPPED | OUTPATIENT
Start: 2019-07-16 | End: 2021-03-23 | Stop reason: SINTOL

## 2019-07-16 NOTE — TELEPHONE ENCOUNTER
PCP: Ronn Maldonado DO    Last appt: Visit date not found  Future Appointments   Date Time Provider Nik Shipley   12/10/2019  1:00 PM Nic Hutson MD [address]       Requested Prescriptions     Pending Prescriptions Disp Refills    sertraline (ZOLOFT) 50 mg tablet 90 Tab 3     Sig: Take 1 Tab by mouth daily.  Indications: Anxiousness associated with Depression

## 2019-07-17 DIAGNOSIS — Z21 ASYMPTOMATIC HIV INFECTION (HCC): Primary | ICD-10-CM

## 2019-07-17 NOTE — TELEPHONE ENCOUNTER
Refill sent today by MD.  Request was made 7/15/19 by patient. Will advise patient to give a 72 hour notice for all prescription refills. Attempted to reach patient by telephone. A message was left for return call.

## 2019-07-17 NOTE — TELEPHONE ENCOUNTER
Verified patient with two types of identifiers. Notified patient refill sent and to next time give at least 2 day notice for all refills. Patient verbalizes understanding. And will call with any questions or concerns.

## 2019-07-17 NOTE — TELEPHONE ENCOUNTER
PSR made the doctor aware  ----        Pt checking on med refill     States he has not had it since Monday 7/15/19    States he will come up here instead of leaving a message     Call from [phone]

## 2019-08-12 ENCOUNTER — TELEPHONE (OUTPATIENT)
Dept: FAMILY MEDICINE CLINIC | Age: 29
End: 2019-08-12

## 2019-08-12 NOTE — TELEPHONE ENCOUNTER
MD Ruma Guillaume LPN   Caller: Unspecified (Today,  9:47 AM)             Not sure what to say if I cannot give his diagnosis?

## 2019-08-12 NOTE — TELEPHONE ENCOUNTER
Pt wants a letter for Isabel to help him find housing       He states it doesn't have to state his diagnosis just his condition     Best number to reach him is [phone]

## 2019-08-13 ENCOUNTER — TELEPHONE (OUTPATIENT)
Dept: FAMILY MEDICINE CLINIC | Age: 29
End: 2019-08-13

## 2019-08-13 NOTE — TELEPHONE ENCOUNTER
----- Message from Dev Hobson sent at 8/13/2019  9:41 AM EDT -----  Regarding: Dr. Marta Geronimo   Pt is requesting a letter stating his diagnosis clearly for his  that is a [employer] in order for him to proceed with a program to help him with housing. He would like to be contacted when the letter is available the  in order for him to have it for his appointment tomorrow with the . Best contact number is  [phone].

## 2019-08-13 NOTE — TELEPHONE ENCOUNTER
----- Message from Nelida Davila sent at 8/13/2019 10:23 AM EDT -----  Regarding: Dr. Bassett Amel: [phone]  Pt returning missed call from 8/13/19 in regards to verification paperwork. Best contact number [phone].

## 2019-08-13 NOTE — TELEPHONE ENCOUNTER
Verified patient with two types of identifiers. States that his  is trying to get him into a program and he needs a letter from Dr Estella Spears stating what she see's him for and his treatment plan. Advised him that she is out of the [place] but that I could provide him with his last office note from 6/11/19 that states all his ID information in it if he is ok with this. Patient agrees and will  office note for his appt tomorrow. Office note left at  for pt.

## 2019-08-13 NOTE — TELEPHONE ENCOUNTER
----- Message from Elise Francisco sent at 8/13/2019 10:23 AM EDT -----  Regarding: Dr. Gomez Freed: [phone]  Pt returning missed call from 8/13/19 in regards to verification paperwork. Best contact number [phone].

## 2019-09-17 ENCOUNTER — TELEPHONE (OUTPATIENT)
Dept: FAMILY MEDICINE CLINIC | Age: 29
End: 2019-09-17

## 2019-09-17 NOTE — TELEPHONE ENCOUNTER
Pt would like to speak to Dr. Allyson Neri states he is under stress trying to find housing - will be homeless on 9/28/19  Because of this stress he cannot remember if he is taking his medication and is just mentally and physically not feeling well     Pls call to advise       Best number to reach him is [phone]

## 2019-09-19 NOTE — TELEPHONE ENCOUNTER
MD Kandy Mendoza LPN                Tell him to try [employer] for housing . Do you know of any resources? Ask him please if he wants to come in to be seen     Above message given to patient. He said he would try there & call if needs anything else.

## 2019-12-10 ENCOUNTER — OFFICE VISIT (OUTPATIENT)
Dept: FAMILY MEDICINE CLINIC | Age: 29
End: 2019-12-10

## 2019-12-10 VITALS
SYSTOLIC BLOOD PRESSURE: 104 MMHG | TEMPERATURE: 97.2 F | OXYGEN SATURATION: 98 % | DIASTOLIC BLOOD PRESSURE: 64 MMHG | RESPIRATION RATE: 17 BRPM | BODY MASS INDEX: 22.79 KG/M2 | HEIGHT: 66 IN | WEIGHT: 141.8 LBS | HEART RATE: 74 BPM

## 2019-12-10 DIAGNOSIS — E78.00 PURE HYPERCHOLESTEROLEMIA: ICD-10-CM

## 2019-12-10 DIAGNOSIS — F31.9 DEPRESSED BIPOLAR DISORDER (HCC): ICD-10-CM

## 2019-12-10 DIAGNOSIS — E55.9 VITAMIN D DEFICIENCY: ICD-10-CM

## 2019-12-10 DIAGNOSIS — Z23 ENCOUNTER FOR IMMUNIZATION: ICD-10-CM

## 2019-12-10 DIAGNOSIS — R73.9 ELEVATED BLOOD SUGAR: ICD-10-CM

## 2019-12-10 DIAGNOSIS — F06.4 ANXIETY DISORDER DUE TO KNOWN PHYSIOLOGICAL CONDITION: ICD-10-CM

## 2019-12-10 DIAGNOSIS — Z21 ASYMPTOMATIC HIV INFECTION (HCC): Primary | ICD-10-CM

## 2019-12-10 DIAGNOSIS — F33.1 MODERATE EPISODE OF RECURRENT MAJOR DEPRESSIVE DISORDER (HCC): ICD-10-CM

## 2019-12-10 NOTE — PATIENT INSTRUCTIONS
Vaccine Information Statement Influenza (Flu) Vaccine (Inactivated or Recombinant): What You Need to Know Many Vaccine Information Statements are available in [place] and other languages. See www.immunize.org/vis Hojas de información sobre vacunas están disponibles en español y en muchos otros idiomas. Visite www.immunize.org/vis 1. Why get vaccinated? Influenza vaccine can prevent influenza (flu). Flu is a contagious disease that spreads around the [facility] every year, usually between October and May. Anyone can get the flu, but it is more dangerous for some people. Infants and young children, people 72years of age and older, pregnant women, and people with certain health conditions or a weakened immune system are at greatest risk of flu complications. Pneumonia, bronchitis, sinus infections and ear infections are examples of flu-related complications. If you have a medical condition, such as heart disease, cancer or diabetes, flu can make it worse. Flu can cause fever and chills, sore throat, muscle aches, fatigue, cough, headache, and runny or stuffy nose. Some people may have vomiting and diarrhea, though this is more common in children than adults. Each year thousands of people in the [facility] die from flu, and many more are hospitalized. Flu vaccine prevents millions of illnesses and flu-related visits to the doctor each year. 2. Influenza vaccines CDC recommends everyone 10months of age and older get vaccinated every flu season. Children 6 months through 6years of age may need 2 doses during a single flu season. Everyone else needs only 1 dose each flu season. It takes about 2 weeks for protection to develop after vaccination. There are many flu viruses, and they are always changing. Each year a new flu vaccine is made to protect against three or four viruses that are likely to cause disease in the upcoming flu season.  Even when the vaccine doesnt exactly match these viruses, it may still provide some protection. Influenza vaccine does not cause flu. Influenza vaccine may be given at the same time as other vaccines. 3. Talk with your health care provider Tell your vaccine provider if the person getting the vaccine: 
 Has had an allergic reaction after a previous dose of influenza vaccine, or has any severe, life-threatening allergies.  Has ever had Guillain-Barré Syndrome (also called GBS). In some cases, your health care provider may decide to postpone influenza vaccination to a future visit. People with minor illnesses, such as a cold, may be vaccinated. People who are moderately or severely ill should usually wait until they recover before getting influenza vaccine. Your health care provider can give you more information. 4. Risks of a reaction  Soreness, redness, and swelling where shot is given, fever, muscle aches, and headache can happen after influenza vaccine.  There may be a very small increased risk of Guillain-Barré Syndrome (GBS) after inactivated influenza vaccine (the flu shot). Isrrael Flores children who get the flu shot along with pneumococcal vaccine (PCV13), and/or DTaP vaccine at the same time might be slightly more likely to have a seizure caused by fever. Tell your health care provider if a child who is getting flu vaccine has ever had a seizure. People sometimes faint after medical procedures, including vaccination. Tell your provider if you feel dizzy or have vision changes or ringing in the ears. As with any medicine, there is a very remote chance of a vaccine causing a severe allergic reaction, other serious injury, or death. 5. What if there is a serious problem? An allergic reaction could occur after the vaccinated person leaves the clinic.  If you see signs of a severe allergic reaction (hives, swelling of the face and throat, difficulty breathing, a fast heartbeat, dizziness, or weakness), call 9-1-1 and get the person to the nearest hospital. 
 
For other signs that concern you, call your health care provider. Adverse reactions should be reported to the Vaccine Adverse Event Reporting System (VAERS). Your health care provider will usually file this report, or you can do it yourself. Visit the VAERS website at www.vaers. hhs.gov or call 2-496.318.2251. VAERS is only for reporting reactions, and VAERS staff do not give medical advice. 6. The National Vaccine Injury Compensation Program 
 
The [facility] Vaccine Injury Compensation Program (VICP) is a federal program that was created to compensate people who may have been injured by certain vaccines. Visit the VICP website at www.hrsa.gov/vaccinecompensation or call 0-121.638.8060 to learn about the program and about filing a claim. There is a time limit to file a claim for compensation. 7. How can I learn more?  Ask your health care provider.  Call your local or state health department.  Contact the Centers for Disease Control and Prevention (CDC): 
- Call 7-653.204.4001 (4-668-PZP-INFO) or 
- Visit CDCs influenza website at www.cdc.gov/flu Vaccine Information Statement (Interim) Inactivated Influenza Vaccine 8/15/2019 
42 ENA Cruz 687UF-44 Department of Health and [employer] Centers for Disease Control and Prevention Office Use Only

## 2019-12-10 NOTE — PROGRESS NOTES
Chief Complaint   Patient presents with    Follow-up     HIV     1. Have you been to the ER, urgent care clinic since your last visit? No   Hospitalized since your last visit? No     2. Have you seen or consulted any other health care providers outside of the [address] [place] [address] since your last visit?    No     Per verbal order from Dr. Alanis Fu, give flu vaccine & MenAcwy    Flu vaccine given in the left deltoid  MenAcwy given in the right deltoid  No complaints at this time  No redness or swelling at the vaccine site's at this time  Patient tolerated well    Kelvin Mcgill LPN

## 2019-12-10 NOTE — PROGRESS NOTES
Subjective:   Nasra Nelson III is a 33 y.o. male who presents for HIV follow up. Pt known to me from before   Pt 100 per cent compliant with Genvoya  . In stable, committed relationship . Partner is negative . They use condoms . Partner does not want to do PrEP    When was first positive test for HIV? September, 2013. [facility]  Previous HIV Negative Test: never  What was the reason for being tested?not feeling well. Initial CD4 >200   Initial VL- dont remember  Mode of HIV infection:Homosexual contact     Date Value   Last CD4 5/29/19 1028   Last VL 5/29/19 <20     Patient's usual source of health care:ID Specialist  Oppurtunistic Infections: none  Sexually transmitted diseases: none  Tuberculosis: Any known exposure to MTB  no  Anal Pap: not done   Immunizations by Immunization family     Hep A Vaccine 5/10/2016       Influenza Vaccine 12/3/2013 10/20/2015 3/3/2017 12/10/2019    Meningococcal ACWY Vaccine 6/11/2019       Pneumococcal Conjugate (PCV) 7/12/2018       Pneumococcal Polysaccharide (PPSV-23) 3/3/2017       TB Skin Test (PPD) Intradermal 7/7/2014          Pt for Flu vccine, 2nd dose of Menveo  ROS  no specific complaints   Pt agreeable to meningo coccal vaccine    No Known Allergies    Current Outpatient Medications   Medication Sig Dispense Refill    elvitegravir-cobicistat-emtricitabine-tenofovir alafenamide (GENVOYA) tab tablet Take 1 Tab by mouth daily (with breakfast). 30 Tab 6    sertraline (ZOLOFT) 50 mg tablet Take 1 Tab by mouth daily. Indications: Anxiousness associated with Depression 90 Tab 3    butalbital-acetaminophen-caff (FIORICET) -40 mg per capsule Take 1 Cap by mouth every six (6) hours as needed for Headache for up to 12 doses. 12 Cap 0    elvitegravir-cobicistat-emtricitabine-tenofovir alafenamide (GENVOYA) tab tablet Take 1 Tab by mouth daily. Must be seen before any additional refills.   Indications: HIV 30 Tab 0       Past Medical History:   Diagnosis Date    Asthma     Depression     Depression 8/22/2016    HIV (human immunodeficiency virus infection) ([facility] 12/3/2013       Social History     Socioeconomic History    Marital status: SINGLE     Spouse name: Not on file    Number of children: Not on file    Years of education: Not on file    Highest education level: Not on file   Occupational History    Not on file   Social Needs    Financial resource strain: Not on file    Food insecurity:     Worry: Not on file     Inability: Not on file    Transportation needs:     Medical: Not on file     Non-medical: Not on file   Tobacco Use    Smoking status: Current Some Day Smoker    Smokeless tobacco: Never Used    Tobacco comment: black and mild   Substance and Sexual Activity    Alcohol use: Yes     Comment: Occasionally    Drug use: No    Sexual activity: Yes     Partners: Male   Lifestyle    Physical activity:     Days per week: Not on file     Minutes per session: Not on file    Stress: Not on file   Relationships    Social connections:     Talks on phone: Not on file     Gets together: Not on file     Attends Church service: Not on file     Active member of club or organization: Not on file     Attends meetings of clubs or organizations: Not on file     Relationship status: Not on file    Intimate partner violence:     Fear of current or ex partner: Not on file     Emotionally abused: Not on file     Physically abused: Not on file     Forced sexual activity: Not on file   Other Topics Concern    Not on file   Social History Narrative    Not on file     Homosexual- yes  Drug used discussed yes  Is the patient sexually active? yes  Safer sex guidelines discussed yes  If yes, partner(s)  aware of patient's status? yes  Psychiatric history. depression and anxiety        Objective:     Visit Vitals  /64 (BP 1 Location: Left arm, BP Patient Position: Sitting)   Pulse 74   Temp 97.2 °F (36.2 °C) (Oral)   Resp 17   Ht 5' 6\" (1.676 m)   Wt 141 lb 12.8 oz (64.3 kg)   SpO2 98%   BMI 22.89 kg/m²           General appearance  alert, cooperative, no distress, appears stated age   Head  Normocephalic, without obvious abnormality, atraumatic   Eyes  conjunctivae/corneas clear. PERRL, EOM's intact. Fundi benign   Ears  normal TM's and external ear canals AU   Nose Nares normal. Septum midline. Mucosa normal. No drainage or sinus tenderness. Throat Lips, mucosa, and tongue normal. Teeth and gums normal   Neck supple, symmetrical, trachea midline, no adenopathy, thyroid: not enlarged, symmetric, no tenderness/mass/nodules, no carotid bruit and no JVD   Back   symmetric, no curvature. ROM normal. No CVA tenderness   Lungs   clear to auscultation bilaterally   Chest wall  no tenderness   Heart  regular rate and rhythm, S1, S2 normal, no murmur, click, rub or gallop   Abdomen   soft, non-tender. Bowel sounds normal. No masses,  No organomegaly   Genitalia  deferred   Rectal  deferred   Extremities extremities normal, atraumatic, no cyanosis or edema   Pulses 2+ and symmetric   Skin Skin color, texture, turgor normal. No rashes or lesions   Lymph nodes Cervical, supraclavicular, and axillary nodes normal.   Neurologic Normal       Assessment:         ICD-10-CM ICD-9-CM    1. Asymptomatic HIV infection (HCC) Z21 V08 elvitegravir-cobicistat-emtricitabine-tenofovir alafenamide (GENVOYA) tab tablet   2. Encounter for immunization Z23 V03.89 INFLUENZA VIRUS VAC QUAD,SPLIT,PRESV FREE SYRINGE IM      elvitegravir-cobicistat-emtricitabine-tenofovir alafenamide (GENVOYA) tab tablet   3. Vitamin D deficiency E55.9 268.9 elvitegravir-cobicistat-emtricitabine-tenofovir alafenamide (GENVOYA) tab tablet   4. Depressed bipolar disorder ([facility] [address] F31.9 296.50 elvitegravir-cobicistat-emtricitabine-tenofovir alafenamide (GENVOYA) tab tablet   5.  Anxiety disorder due to known physiological condition F06.4 300.00 elvitegravir-cobicistat-emtricitabine-tenofovir alafenamide (GENVOYA) tab tablet   6. Moderate episode of recurrent major depressive disorder (HCC) F33.1 296.32    7. Elevated blood sugar R73.9 790.29    8. Pure hypercholesterolemia E78.00 272.0        I have discussed the diagnosis with the patient and the intended plan as seen in the above orders. The patient has received an after-visit summary and questions were answered concerning future plans. I have discussed medication side effects and warnings with the patient as well. Reviewed test results at length with patient      IMPRESSION/ PLAN:   1. HIV well controlled on Genvoya po, CD4/ VL   1028/ <20  2. Anxiety/ depression well under control, continue Sertraline  3. Nausea with Sertraline , take with food  4. Vit D deficiency , take supplement  daily  5. Elevated blood sugar for A1c  6. Hypercholesterolemia for lipid levels  7. For Menveo 2nd dose today, Flu shot today   8.   Labs this week, F/u 6 months       Continue present medication(s)Genvoya  Begin medication(s): MVT , Vit D   Continue medication(s): Genvoya  Immunization(s) ordered: Menveo  Schedule labs: 6months  Patient education: safe sex, exercise      MD Brad Rios

## 2020-03-17 ENCOUNTER — TELEPHONE (OUTPATIENT)
Dept: FAMILY MEDICINE CLINIC | Age: 30
End: 2020-03-17

## 2020-03-17 ENCOUNTER — DOCUMENTATION ONLY (OUTPATIENT)
Dept: FAMILY MEDICINE CLINIC | Age: 30
End: 2020-03-17

## 2020-03-17 NOTE — TELEPHONE ENCOUNTER
Pt is checking on any special precautions he should be taking while the Corona virus is out     States he work in a [employer]       [phone]

## 2020-06-09 ENCOUNTER — VIRTUAL VISIT (OUTPATIENT)
Dept: FAMILY MEDICINE CLINIC | Age: 30
End: 2020-06-09

## 2020-06-09 DIAGNOSIS — E55.9 VITAMIN D DEFICIENCY: ICD-10-CM

## 2020-06-09 DIAGNOSIS — F06.4 ANXIETY DISORDER DUE TO KNOWN PHYSIOLOGICAL CONDITION: ICD-10-CM

## 2020-06-09 DIAGNOSIS — Z21 ASYMPTOMATIC HIV INFECTION (HCC): Primary | ICD-10-CM

## 2020-06-09 DIAGNOSIS — E78.00 PURE HYPERCHOLESTEROLEMIA: ICD-10-CM

## 2020-06-09 NOTE — PATIENT INSTRUCTIONS
[employer] Activation Thank you for requesting access to [facility]. Please follow the instructions below to securely access and download your online medical record. [facility]fora allows you to send messages to your doctor, view your test results, renew your prescriptions, schedule appointments, and more. How Do I Sign Up? 1. In your internet browser, go to [email or web address]. 2. Click on the First Time User? Click Here link in the Sign In box. You will see the New Member Sign Up page. 3. Enter your Novafora Access Code exactly as it appears below. You will not need to use this code after youve completed the sign-up process. If you do not sign up before the expiration date, you must request a new code. [facility]fora Access Code: Activation code not generated Current Novafora Status: Active (This is the date your Novafora access code will ) 4. Enter the last four digits of your Social Security Number (xxxx) and Date of Birth (mm/dd/yyyy) as indicated and click Submit. You will be taken to the next sign-up page. 5. Create a Novafora ID. This will be your Novafora login ID and cannot be changed, so think of one that is secure and easy to remember. 6. Create a Novafora password. You can change your password at any time. 7. Enter your Password Reset Question and Answer. This can be used at a later time if you forget your password. 8. Enter your e-mail address. You will receive e-mail notification when new information is available in [address] 9. Click Sign Up. You can now view and download portions of your medical record. 10. Click the Download Summary menu link to download a portable copy of your medical information. Additional Information If you have questions, please visit the Frequently Asked Questions section of the Novafora website at [email or web address] Remember, [employer]a is NOT to be used for urgent needs. For medical emergencies, dial 911.

## 2020-06-09 NOTE — PROGRESS NOTES
VV  Preferred phone #[phone]  Pt verbally agrees to labs, orders, and others to be mailed to confirmed home address. Identified pt with two pt identifiers(name and ). Reviewed record in preparation for visit and have obtained necessary documentation. All patient medications has been reviewed. Chief Complaint   Patient presents with    HIV    Lethargy     pt states, is taking meds as directed.  Chest Pain     chest tightness happens 6-7pm. SOB. Health Maintenance Due   Topic    DTaP/Tdap/Td series (1 - Tdap)       There were no vitals filed for this visit. Wt Readings from Last 3 Encounters:   12/10/19 141 lb 12.8 oz (64.3 kg)   19 140 lb 14 oz (63.9 kg)   19 144 lb (65.3 kg)     Temp Readings from Last 3 Encounters:   12/10/19 97.2 °F (36.2 °C) (Oral)   19 98.2 °F (36.8 °C)   19 98.1 °F (36.7 °C) (Oral)     BP Readings from Last 3 Encounters:   12/10/19 104/64   19 127/90   19 108/80     Pulse Readings from Last 3 Encounters:   12/10/19 74   19 68   19 67       Lab Results   Component Value Date/Time    Hemoglobin A1c 5.2 2017 04:06 PM       Coordination of Care Questionnaire:   1) Have you been to an emergency room, urgent care, or hospitalized since your last visit?   no       2. Have seen or consulted any other health care provider since your last visit? NO    3) Do you have an Advanced Directive/ Living Will in place? NO  If yes, do we have a copy on file NO  If no, would you like information NO    Patient is accompanied by self I have received verbal consent from Jose David Cuello III to discuss any/all medical information while they are present in the room.

## 2020-06-09 NOTE — PROGRESS NOTES
Subjective:   Jose David Cuello III is a 26 y.o. male who is seen  for HIV follow up. Pt known to me from before. 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 Services were provided through a video synchronous discussion virtually to substitute for in-person visit. Pt 100 per cent compliant with Genvoya  . In stable, committed relationship . Partner is negative . They use condoms . Partner does not want to do PrEP  When was first positive test for HIV? September, 2013. [facility]  Previous HIV Negative Test: never  What was the reason for being tested?not feeling well.    Initial CD4 >200   Initial VL- dont remember  Mode of HIV infection:Homosexual contact     Date Value   Last CD4 5/29/19 1028   Last VL 5/29/19 <20     Patient's usual source of health care:ID Specialist  Oppurtunistic Infections: none  Sexually transmitted diseases: none  Tuberculosis: Any known exposure to MTB  no  Anal Pap: not done   Immunizations by Immunization family     Hep A Vaccine 5/10/2016       Influenza Vaccine 12/3/2013 10/20/2015 3/3/2017 12/10/2019    Meningococcal ACWY Vaccine 6/11/2019 12/10/2019      Pneumococcal Conjugate (PCV) 7/12/2018       Pneumococcal Polysaccharide (PPSV-23) 3/3/2017       TB Skin Test (PPD) Intradermal 7/7/2014          Pt for Flu vccine, 2nd dose of Menveo  ROS  no specific complaints   Pt agreeable to meningo coccal vaccine    No Known Allergies    Current Outpatient Medications   Medication Sig Dispense Refill    elvitegravir-cobicistat-emtricitabine-tenofovir alafenamide (GENVOYA) tab tablet Take 1 Tab by mouth daily (with breakfast). 30 Tab 6    sertraline (ZOLOFT) 50 mg tablet Take 1 Tab by mouth daily. Indications: Anxiousness associated with Depression 90 Tab 3    butalbital-acetaminophen-caff (FIORICET) -40 mg per capsule Take 1 Cap by mouth every six (6) hours as needed for Headache for up to 12 doses. (Patient not taking: Reported on 6/9/2020) 12 Cap 0    elvitegravir-cobicistat-emtricitabine-tenofovir alafenamide (GENVOYA) tab tablet Take 1 Tab by mouth daily. Must be seen before any additional refills.   Indications: HIV (Patient not taking: Reported on 6/9/2020) 30 Tab 0       Past Medical History:   Diagnosis Date    Asthma     Depression     Depression 8/22/2016    [facility]       Social History     Socioeconomic History    Marital status: SINGLE     Spouse name: Not on file    Number of children: Not on file    Years of education: Not on file    Highest education level: Not on file   Occupational History    Not on file   Social Needs    Financial resource strain: Not on file    Food insecurity     Worry: Not on file     Inability: Not on file    Transportation needs     Medical: Not on file     Non-medical: Not on file   Tobacco Use    Smoking status: Current Some Day Smoker    Smokeless tobacco: Never Used    Tobacco comment: black and mild   Substance and Sexual Activity    Alcohol use: Yes     Comment: Occasionally    Drug use: No    Sexual activity: Yes     Partners: Male   Lifestyle    Physical activity     Days per week: Not on file     Minutes per session: Not on file    Stress: Not on file   Relationships    Social connections     Talks on phone: Not on file     Gets together: Not on file     Attends Latter-day service: Not on file     Active member of club or organization: Not on file     Attends meetings of clubs or organizations: Not on file     Relationship status: Not on file    Intimate partner violence     Fear of current or ex partner: Not on file     Emotionally abused: Not on file     Physically abused: Not on file     Forced sexual activity: Not on file   Other Topics Concern    Not on file   Social History Narrative    Not on file     Homosexual- yes  Drug used discussed yes  Is the patient sexually active? yes  Safer sex guidelines discussed yes  If yes, partner(s)  aware of patient's status? yes  Psychiatric history. depression and anxiety        Objective:     Physical exam:  Limited   GEN: NAD  NECK- supple  RESP: no distress  NEURO:non focal  No LE edema          Assessment:       IMPRESSION/ PLAN:   1. HIV well controlled on Genvoya po, CD4/ VL   1028/ <20  2. Anxiety pt publishing book , feel very stressed  3. Vit D deficiency , take supplement  daily  4. Hypercholesterolemia for lipid levels  5. S/p Menveo 2nd dose s/p  Flu shot today   6. Labs when pandemic situation improves, F/u 6 months  7.  Pt advised social distancing , wear mask & wash hands / use hand        Continue present medication(s)Genvoya  Begin medication(s): MVT , Vit D   Continue medication(s): Genvoya  Immunization(s) ordered: Menveo  Schedule labs: 6months  Patient education: safe sex, exercise      Rhona Cea, MD Acey Gosselin

## 2020-06-15 ENCOUNTER — TELEPHONE (OUTPATIENT)
Dept: INTERNAL MEDICINE CLINIC | Age: 30
End: 2020-06-15

## 2020-06-15 ENCOUNTER — OFFICE VISIT (OUTPATIENT)
Dept: URGENT CARE | Age: 30
End: 2020-06-15

## 2020-06-15 ENCOUNTER — NURSE TRIAGE (OUTPATIENT)
Dept: OTHER | Facility: CLINIC | Age: 30
End: 2020-06-15

## 2020-06-15 VITALS — OXYGEN SATURATION: 98 % | TEMPERATURE: 98.8 F | HEART RATE: 91 BPM | RESPIRATION RATE: 18 BRPM

## 2020-06-15 DIAGNOSIS — Z11.59 SCREENING FOR VIRAL DISEASE: Primary | ICD-10-CM

## 2020-06-15 NOTE — TELEPHONE ENCOUNTER
pls call [facility] express urgent care in [address] 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

## 2020-06-15 NOTE — LETTER
FLU CLINIC [facility] 
FLU CLINIC [facility] [address] [facility] URGENT [facility]ARE 
[address] [facility] [address] 
[phone] Work/School Note Date: 6/15/2020 To Whom It May concern: 
 
Isaías Hearn III was seen and treated today in the urgent care center by the following provider, Sue Eisenberg. Sincerely, Trang Shelley

## 2020-06-15 NOTE — PROGRESS NOTES
The history is provided by the patient. Patient presenting to [facility] for COVID testing. 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 Patient denies any abnormal symptoms at this time. Denies SOB or Fever.   Past Medical History:   Diagnosis Date    Asthma     Depression     Depression 8/22/2016    HIV (human immunodeficiency virus infection) ([facility] [address] 12/3/2013        Past Surgical History:   Procedure Laterality Date    HX WISDOM TEETH EXTRACTION           Family History   Problem Relation Age of Onset    Hypertension Mother     Diabetes Mother     Depression Mother     Depression Father     Hypertension Father         Social History     Socioeconomic History    Marital status: SINGLE     Spouse name: Not on file    Number of children: Not on file    Years of education: Not on file    Highest education level: Not on file   Occupational History    Not on file   Social Needs    Financial resource strain: Not on file    Food insecurity     Worry: Not on file     Inability: Not on file    Transportation needs     Medical: Not on file     Non-medical: Not on file   Tobacco Use    Smoking status: Current Some Day Smoker    Smokeless tobacco: Never Used    Tobacco comment: black and mild   Substance and Sexual Activity    Alcohol use: Yes     Comment: Occasionally    Drug use: No    Sexual activity: Yes     Partners: Male   Lifestyle    Physical activity     Days per week: Not on file     Minutes per session: Not on file    Stress: Not on file   Relationships    Social connections     Talks on phone: Not on file     Gets together: Not on file     Attends [employer] service: Not on file     Active member of club or organization: Not on file     Attends meetings of clubs or organizations: Not on file     Relationship status: Not on file    Intimate partner violence     Fear of current or ex partner: Not on file     Emotionally abused: Not on file Physically abused: Not on file     Forced sexual activity: Not on file   Other Topics Concern    Not on file   Social History Narrative    Not on file                ALLERGIES: Patient has no known allergies. Review of Systems   Constitutional: Negative for chills, fatigue and fever. HENT: Negative. Respiratory: Negative for cough, chest tightness, shortness of breath and wheezing. Cardiovascular: Negative. Gastrointestinal: Negative. Musculoskeletal: Negative. Neurological: Negative. Vitals:    06/15/20 1227   Pulse: 91   Resp: 18   Temp: 98.8 °F (37.1 °C)   SpO2: 98%       Physical Exam  Constitutional:       Appearance: Normal appearance. He is well-developed. Cardiovascular:      Rate and Rhythm: Normal rate and regular rhythm. Heart sounds: Normal heart sounds. Pulmonary:      Effort: Pulmonary effort is normal.      Breath sounds: Normal breath sounds. No wheezing or rhonchi. Skin:     General: Skin is warm. Neurological:      Mental Status: He is alert and oriented to person, place, and time. Psychiatric:         Mood and Affect: Mood normal.         MDM     Differential Diagnosis; Clinical Impression; Plan:     (Z11.59) Screening for viral disease  (primary encounter diagnosis)  No orders of the defined types were placed in this encounter. Tested patient for COVID-19. Patient given education material.  The condition was discussed with the patient and they understand. If symptoms worsen the pt is to go to the ER. Advised patient to take tylenol for discomfort. Advised to quarantine self. This patient was seen in Flu Clinic at [address] [place] [address] Urgent Care while in their vehicle due to COVID-19 pandemic with PPE and focused examination in order to decrease community viral transmission. The patient/guardian gave verbal consent to treat.           Procedures

## 2020-06-15 NOTE — TELEPHONE ENCOUNTER
Reason for Disposition   [1] COVID-19 EXPOSURE (Close Contact) within last 14 days AND [2] needs COVID-19 lab test to return to work AND [3] NO symptoms    Answer Assessment - Initial Assessment Questions  1. CLOSE CONTACT: \"Who is the person with the confirmed or suspected COVID-19 infection that you were exposed to? \"      Coworker tested positive  2. PLACE of CONTACT: \"Where were you when you were exposed to COVID-19? \" (e.g., home, school, medical waiting room; which city?)    At work  3. TYPE of CONTACT: \"How much contact was there? \" (e.g., sitting next to, live in same house, work in same office, same building)    Close contact, wearing masks  4. DURATION of CONTACT: \"How long were you in contact with the COVID-19 patient? \" (e.g., a few seconds, passed by person, a few minutes, live with the patient)  Was around her about 30 min in the morning and about 5 minutes in the evening  5. DATE of CONTACT: \"When did you have contact with a COVID-19 patient? \" (e.g., how many days ago)     Last worked with her on Friday morning and evening  6. TRAVEL: \"Have you traveled out of the country recently? \" If so, \"When and where? \"      * Also ask about out-of-state travel, since the CDC has identified some high-risk cities for community spread in the [address] [place] [address]. * Note: Travel becomes less relevant if there is widespread community transmission where the patient lives. 7. COMMUNITY SPREAD: \"Are there lots of cases of COVID-19 (community spread) where you live? \" (See public health department website, if unsure)          8. SYMPTOMS: \"Do you have any symptoms? \" (e.g., fever, cough, breathing difficulty)      no symptoms  9. PREGNANCY OR POSTPARTUM: \"Is there any chance you are pregnant? \" \"When was your last menstrual period? \" \"Did you deliver in the last 2 weeks? \"  na  10. HIGH RISK: \"Do you have any heart or lung problems? Do you have a weak immune system? \" (e.g., CHF, COPD, asthma, HIV positive, chemotherapy, renal failure, diabetes mellitus, sickle cell anemia)        asthma    Protocols used: CORONAVIRUS (COVID-19) EXPOSURE-ADULT-AH  Patient called pre-service center [facility]) to schedule appointment, with red flag complaint, transferred to RN access for triage. Pt calling with COVID exposure to a coworker. Currently no symptoms. Recommend he monitor for symptoms, provided info for 24/7, call back as needed.

## 2020-06-15 NOTE — TELEPHONE ENCOUNTER
[facility] they stated a appt is not needed they are only doing the drive thru. Patient verbalized understanding and address was provided.

## 2020-06-17 DIAGNOSIS — F31.9 DEPRESSED BIPOLAR DISORDER (HCC): ICD-10-CM

## 2020-06-17 DIAGNOSIS — Z23 ENCOUNTER FOR IMMUNIZATION: ICD-10-CM

## 2020-06-17 DIAGNOSIS — E55.9 VITAMIN D DEFICIENCY: ICD-10-CM

## 2020-06-17 DIAGNOSIS — Z21 ASYMPTOMATIC HIV INFECTION (HCC): ICD-10-CM

## 2020-06-17 DIAGNOSIS — F06.4 ANXIETY DISORDER DUE TO KNOWN PHYSIOLOGICAL CONDITION: ICD-10-CM

## 2020-06-17 LAB — SARS-COV-2, NAA: NOT DETECTED

## 2020-06-17 NOTE — LETTER
8/19/2020 Nasra Nelson III 
[address] [id number] Dear Mr. Pili Petit, 
 
Med Refill Appointment Needed: We have received a medication renewal request for you but have been unable to reach you by phone to discuss this further. Without office visits and appropriate monitoring, your healthcare provider cannot be sure that the medication they are prescribing is treating your conditions effectively. Please contact the office at the number above to schedule a follow up visit. Sincerely, Marii Blackman III, DO Be aware that although these visits are important to keeping you well, your visit may be subject to fees such as co-pays, deductibles, etc.  Please contact your insurance carrier for your specific plan details if you are unsure.

## 2020-06-18 RX ORDER — SERTRALINE HYDROCHLORIDE 50 MG/1
TABLET, FILM COATED ORAL
Qty: 60 TAB | Refills: 0 | OUTPATIENT
Start: 2020-06-18

## 2020-06-22 RX ORDER — ELVITEGRAVIR, COBICISTAT, EMTRICITABINE, AND TENOFOVIR ALAFENAMIDE 150; 150; 200; 10 MG/1; MG/1; MG/1; MG/1
TABLET ORAL
Qty: 60 TAB | Refills: 0 | Status: SHIPPED | OUTPATIENT
Start: 2020-06-22 | End: 2021-03-01 | Stop reason: SDUPTHER

## 2021-01-07 ENCOUNTER — OFFICE VISIT (OUTPATIENT)
Dept: URGENT CARE | Age: 31
End: 2021-01-07
Payer: OTHER GOVERNMENT

## 2021-01-07 VITALS — OXYGEN SATURATION: 100 % | RESPIRATION RATE: 16 BRPM | TEMPERATURE: 98.8 F | HEART RATE: 94 BPM

## 2021-01-07 DIAGNOSIS — U07.1 COVID-19: Primary | ICD-10-CM

## 2021-01-07 PROCEDURE — 99214 OFFICE O/P EST MOD 30 MIN: CPT | Performed by: FAMILY MEDICINE

## 2021-01-07 NOTE — PROGRESS NOTES
This patient was seen at [address] [place] [address] Urgent Care while in their vehicle due to COVID-19 pandemic with PPE and focused examination in order to decrease community viral transmission. The patient/guardian gave verbal consent to treat. Kimberley Calabrese is a 31 y.o. male who presents for COVID-19 testing. Tested positive 6 days ago, had an exposure from co-worker. Denies cough, fever, SOB. Feels fine. Needs negative test in order to return to work. The history is provided by the patient.         Past Medical History:   Diagnosis Date    Asthma     Depression     Depression 8/22/2016    [facility]2013        Past Surgical History:   Procedure Laterality Date    HX WISDOM TEETH EXTRACTION           Family History   Problem Relation Age of Onset    Hypertension Mother     Diabetes Mother     Depression Mother     Depression Father     Hypertension Father         Social History     Socioeconomic History    Marital status: SINGLE     Spouse name: Not on file    Number of children: Not on file    Years of education: Not on file    Highest education level: Not on file   Occupational History    Not on file   Social Needs    Financial resource strain: Not on file    Food insecurity     Worry: Not on file     Inability: Not on file    Transportation needs     Medical: Not on file     Non-medical: Not on file   Tobacco Use    Smoking status: Current Some Day Smoker    Smokeless tobacco: Current User    Tobacco comment: black and mild   Substance and Sexual Activity    Alcohol use: Not Currently     Comment: Occasionally    Drug use: No    Sexual activity: Yes     Partners: Male   Lifestyle    Physical activity     Days per week: Not on file     Minutes per session: Not on file    Stress: Not on file   Relationships    Social connections     Talks on phone: Not on file     Gets together: Not on file     Attends Alevism service: Not on file Active member of club or organization: Not on file     Attends meetings of clubs or organizations: Not on file     Relationship status: Not on file    Intimate partner violence     Fear of current or ex partner: Not on file     Emotionally abused: Not on file     Physically abused: Not on file     Forced sexual activity: Not on file   Other Topics Concern    Not on file   Social History Narrative    Not on file                ALLERGIES: Patient has no known allergies. Review of Systems   Constitutional: Negative for activity change, appetite change, chills and fever. HENT: Negative for congestion, rhinorrhea and sore throat. Respiratory: Negative for cough, shortness of breath and wheezing. Cardiovascular: Negative for chest pain. Gastrointestinal: Negative for abdominal pain, diarrhea, nausea and vomiting. Musculoskeletal: Negative for myalgias. Neurological: Negative for headaches. Vitals:    01/07/21 0959   Pulse: 94   Resp: 16   Temp: 98.8 °F (37.1 °C)   SpO2: 100%       Physical Exam  Vitals signs and nursing note reviewed. Constitutional:       General: He is not in acute distress. Appearance: He is well-developed. He is not diaphoretic. Pulmonary:      Effort: Pulmonary effort is normal. No respiratory distress. Breath sounds: Normal breath sounds. No stridor. No wheezing, rhonchi or rales. Neurological:      Mental Status: He is alert. Psychiatric:         Behavior: Behavior normal.         Thought Content:  Thought content normal.         Judgment: Judgment normal.         [facility]    ICD-10-CM ICD-9-CM   1. COVID-19  U07.1 079.89       Orders Placed This Encounter    NOVEL CORONAVIRUS (COVID-19)     Scheduling Instructions:      1) Due to current limited availability of the COVID-19 PCR test, tests will be prioritized and may not be completed.              2) Order only if the test result will change clinical management or necessary for a return to mission-critical employment decision.              3) Print and instruct patient to adhere to CDC home isolation program. (Link Above)              4) Set up or refer patient for a monitoring program.              5) Have patient sign up for and leverage MyChart (if not previously done). Order Specific Question:   Is this test for diagnosis or screening? Answer:   Screening     Order Specific Question:   Symptomatic for COVID-19 as defined by CDC? Answer:   No     Order Specific Question:   Hospitalized for COVID-19? Answer:   No     Order Specific Question:   Admitted to ICU for COVID-19? Answer:   No     Order Specific Question:   Employed in healthcare setting? Answer:   No     Order Specific Question:   Resident in a congregate (group) care setting? Answer:   No     Order Specific Question:   Previously tested for COVID-19? Answer: Yes      Advised high likelihood test still positive  Continue Quarantine  Deep breathing exercises, ambulation      If signs and symptoms become worse the pt is to go to the ER.          Procedures

## 2021-01-09 LAB — SARS-COV-2, NAA: NOT DETECTED

## 2021-01-15 ENCOUNTER — TELEPHONE (OUTPATIENT)
Dept: FAMILY MEDICINE CLINIC | Age: 31
End: 2021-01-15

## 2021-01-15 NOTE — TELEPHONE ENCOUNTER
Patient needs to get tested again and stay away from anybody who could potentially be positive. Please let him know.

## 2021-03-01 ENCOUNTER — TELEPHONE (OUTPATIENT)
Dept: FAMILY MEDICINE CLINIC | Age: 31
End: 2021-03-01

## 2021-03-01 DIAGNOSIS — F31.9 DEPRESSED BIPOLAR DISORDER (HCC): ICD-10-CM

## 2021-03-01 DIAGNOSIS — E55.9 VITAMIN D DEFICIENCY: ICD-10-CM

## 2021-03-01 DIAGNOSIS — Z21 ASYMPTOMATIC HIV INFECTION (HCC): ICD-10-CM

## 2021-03-01 DIAGNOSIS — Z21 ASYMPTOMATIC HIV INFECTION (HCC): Primary | ICD-10-CM

## 2021-03-01 DIAGNOSIS — F06.4 ANXIETY DISORDER DUE TO KNOWN PHYSIOLOGICAL CONDITION: ICD-10-CM

## 2021-03-01 DIAGNOSIS — Z23 ENCOUNTER FOR IMMUNIZATION: ICD-10-CM

## 2021-03-01 RX ORDER — ELVITEGRAVIR, COBICISTAT, EMTRICITABINE, AND TENOFOVIR ALAFENAMIDE 150; 150; 200; 10 MG/1; MG/1; MG/1; MG/1
TABLET ORAL
Qty: 60 TAB | Refills: 0 | Status: SHIPPED | OUTPATIENT
Start: 2021-03-01 | End: 2021-05-03 | Stop reason: SDUPTHER

## 2021-03-01 NOTE — TELEPHONE ENCOUNTER
Appointment to be scheduled on 03/17/2021 @ 1pm.  Called patient and left a detailed message notifying patient of appointment information. Patient to return call to office with any questions or concerns. Patient without recent labs on file. Will forward to MD to see if patient need labs prior to 03/17 appointment.

## 2021-03-01 NOTE — TELEPHONE ENCOUNTER
Spoke with patient. Per patient, able to get labs done prior to appointment. Plans to go to [facility] lab @ [id number] [address] [facility]. Order to be faxed.

## 2021-03-01 NOTE — TELEPHONE ENCOUNTER
Future Appointments:  Future Appointments   Date Time Provider Nik Anni   3/23/2021  3:30 PM John Tilley DO [facility]        Last Appointment With Me:  Visit date not found     Requested Prescriptions     Pending Prescriptions Disp Refills    elvitegravir-cobicistat-emtricitabine-tenofovir alafenamide (Genvoya) tab tablet 60 Tab 0     Sig: TAKE 1 TABLET BY MOUTH EVERY DAY WITH BREAKFAST

## 2021-03-08 LAB
Lab: NORMAL
REFERENCE LAB,REFLB: NORMAL
TEST DESCRIPTION:,ATST: NORMAL

## 2021-03-17 ENCOUNTER — VIRTUAL VISIT (OUTPATIENT)
Dept: FAMILY MEDICINE CLINIC | Age: 31
End: 2021-03-17

## 2021-03-17 ENCOUNTER — TELEPHONE (OUTPATIENT)
Dept: FAMILY MEDICINE CLINIC | Age: 31
End: 2021-03-17

## 2021-03-17 DIAGNOSIS — U07.1 COVID-19 VIRUS INFECTION: ICD-10-CM

## 2021-03-17 DIAGNOSIS — F31.9 DEPRESSED BIPOLAR DISORDER (HCC): ICD-10-CM

## 2021-03-17 DIAGNOSIS — E55.9 VITAMIN D DEFICIENCY: ICD-10-CM

## 2021-03-17 DIAGNOSIS — F51.04 PSYCHOPHYSIOLOGICAL INSOMNIA: ICD-10-CM

## 2021-03-17 DIAGNOSIS — R73.9 ELEVATED BLOOD SUGAR: ICD-10-CM

## 2021-03-17 DIAGNOSIS — E78.00 PURE HYPERCHOLESTEROLEMIA: ICD-10-CM

## 2021-03-17 DIAGNOSIS — F06.4 ANXIETY DISORDER DUE TO KNOWN PHYSIOLOGICAL CONDITION: ICD-10-CM

## 2021-03-17 DIAGNOSIS — Z21 ASYMPTOMATIC HIV INFECTION (HCC): Primary | ICD-10-CM

## 2021-03-17 PROCEDURE — 99214 OFFICE O/P EST MOD 30 MIN: CPT | Performed by: INTERNAL MEDICINE

## 2021-03-17 NOTE — PATIENT INSTRUCTIONS
Office Policies Phone calls/patient messages: Please allow up to 24 hours for someone in the office to contact you about your call or message. Be mindful your provider may be out of the office or your message may require further review. We encourage you to use Rewardable for your messages as this is a faster, more efficient way to communicate with our office Medication Refills: 
         
Prescription medications require 48-72 business hours to process. We encourage you to use Rewardable for your refills. For controlled medications: Please allow 72 business hours to process. Certain medications may require you to  a written prescription at our office. NO narcotic/controlled medications will be prescribed after 4pm Monday through Friday or on weekends Form/Paperwork Completion: Vibrowhart Activation Thank you for requesting access to [employer]. Please follow the instructions below to securely access and download your online medical record. Rewardable allows you to send messages to your doctor, view your test results, renew your prescriptions, schedule appointments, and more. How Do I Sign Up? 1. In your internet browser, go to [email or web address]. 2. Click on the First Time User? Click Here link in the Sign In box. You will see the New Member Sign Up page. 3. Enter your Rewardable Access Code exactly as it appears below. You will not need to use this code after youve completed the sign-up process. If you do not sign up before the expiration date, you must request a new code. Rewardable Access Code: Activation code not generated Current Rewardable Status: Active (This is the date your Rewardable access code will ) 4. Enter the last four digits of your Social Security Number (xxxx) and Date of Birth (mm/dd/yyyy) as indicated and click Submit. You will be taken to the next sign-up page. 5. Create a Rewardable ID.  This will be your [email or web address] login ID and cannot be changed, so think of one that is secure and easy to remember. 6. Create a [email or web address] password. You can change your password at any time. 7. Enter your Password Reset Question and Answer. This can be used at a later time if you forget your password. 8. Enter your e-mail address. You will receive e-mail notification when new information is available in [address] 9. Click Sign Up. You can now view and download portions of your medical record. 10. Click the Download Summary menu link to download a portable copy of your medical information. Additional Information If you have questions, please visit the Frequently Asked Questions section of the [email or web address] website at [email or web address] Remember, [email or web address] is NOT to be used for urgent needs. For medical emergencies, dial 911. Please note a $25 fee may incur for all paperwork for completed by our providers. We ask that you allow 7-10 business days. Pre-payment is due prior to picking up/faxing the completed form. You may also download your forms to [email or web address] to have your doctor print off.

## 2021-03-17 NOTE — PROGRESS NOTES
Subjective:   Ivet Comer III is a 31 y.o. male who is seen  for HIV follow up. Pt was seen by Virtual Visit  . Patient is doing well. Taking medications every day. Currently not sexually active, not in a relationship anymore   Pt is under stress . He has anxiety/depression. Not able to sleep at night  Advised patient to see a therapist.  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 Patient had Covid vaccine later in January and second shot in February. Currently asymptomatic. Is wearing a mask, social distancing. Patient states he has gained 10 pounds. Patient advised to exercise eat healthy. Discussed labs with him CD4 has dropped to 778, viral load less than 20. Advised patient that in this is probably effect of Covid disease and  Possibly Covid vaccine as well. Vitamin D level 26.4. Advised patient to take a daily vitamin D supplement, also continue taking a multivitamin. Pursuant to the emergency declaration under the [facility] waiver authority and the [employer] Act, this Virtual Visit was conducted, with patient's consent, to reduce the patient's risk of exposure to COVID-19 and provide continuity of care for an established patient. Services were provided through a video synchronous discussion virtually to substitute for in-person visit. Pt 100 per cent compliant with Genvoya  . In stable, committed relationship . Partner is negative . They use condoms . Partner does not want to do PrEP  When was first positive test for HIV? September, 2013. [facility]  Previous HIV Negative Test: never  What was the reason for being tested?not feeling well.    Initial CD4 >200   Initial VL- dont remember  Mode of HIV infection:Homosexual contact     Date Value   Last CD4 3/5/2021 778   Last VL  3/5/2021 <20     Patient's usual source of health care:ID Specialist  Oppurtunistic Infections: none  Sexually transmitted diseases: none  Tuberculosis: Any known exposure to MTB  no  Anal Pap: not done   Immunizations by Immunization family     Hep A Vaccine 5/10/2016       Influenza Vaccine 12/3/2013 10/20/2015 3/3/2017 12/10/2019    Meningococcal ACWY Vaccine 6/11/2019 12/10/2019      Pneumococcal Conjugate (PCV) 7/12/2018       Pneumococcal Polysaccharide (PPSV-23) 3/3/2017       Sars-cov-2 (Covid-19) Vaccine, Unspecified  1/16/2021 2/6/2021      TB Skin Test (PPD) Intradermal 7/7/2014          Pt for Flu vccine, 2nd dose of Menveo  ROS  no specific complaints   Pt agreeable to meningo coccal vaccine    No Known Allergies    Current Outpatient Medications   Medication Sig Dispense Refill    elvitegravir-cobicistat-emtricitabine-tenofovir alafenamide (GENVOYA) tab tablet Take 1 Tab by mouth daily. Must be seen before any additional refills. Indications: HIV 30 Tab 0    elvitegravir-cobicistat-emtricitabine-tenofovir alafenamide (Genvoya) tab tablet TAKE 1 TABLET BY MOUTH EVERY DAY WITH BREAKFAST 60 Tab 0    sertraline (ZOLOFT) 50 mg tablet Take 1 Tab by mouth daily. Indications: Anxiousness associated with Depression 90 Tab 3    butalbital-acetaminophen-caff (FIORICET) -40 mg per capsule Take 1 Cap by mouth every six (6) hours as needed for Headache for up to 12 doses.  (Patient not taking: Reported on 6/9/2020) 12 Cap 0       Past Medical History:   Diagnosis Date    Asthma     Depression     Depression 8/22/2016    [facility]       Social History     Socioeconomic History    Marital status: SINGLE     Spouse name: Not on file    Number of children: Not on file    Years of education: Not on file    Highest education level: Not on file   Occupational History    Not on file   Social Needs    Financial resource strain: Not on file    Food insecurity     Worry: Not on file     Inability: Not on file    Transportation needs     Medical: Not on file     Non-medical: Not on file   Tobacco Use    Smoking status: Current Every Day Smoker     Packs/day: 0.50    Smokeless tobacco: Current User   Substance and Sexual Activity    Alcohol use: Not Currently     Comment: Occasionally    Drug use: No    Sexual activity: Yes     Partners: Male   Lifestyle    Physical activity     Days per week: Not on file     Minutes per session: Not on file    Stress: Not on file   Relationships    Social connections     Talks on phone: Not on file     Gets together: Not on file     Attends Latter day service: Not on file     Active member of club or organization: Not on file     Attends meetings of clubs or organizations: Not on file     Relationship status: Not on file    Intimate partner violence     Fear of current or ex partner: Not on file     Emotionally abused: Not on file     Physically abused: Not on file     Forced sexual activity: Not on file   Other Topics Concern    Not on file   Social History Narrative    Not on file     Homosexual- yes  Drug used discussed yes  Is the patient sexually active? yes  Safer sex guidelines discussed yes  If yes, partner(s)  aware of patient's status? yes  Psychiatric history. depression and anxiety        Objective:     Physical exam:  Limited   GEN: NAD  NECK- supple  RESP: no distress  NEURO:non focal  No LE edema          Assessment:       IMPRESSION/ PLAN:   1. HIV well controlled on Genvoya po, CD4/ VL   778/ <20  2. Anxiety/depression /stress unable to stay asleep at night for referral to therapist  3. Vit D deficiency, take supplement  daily  4. Hypercholesterolemia for lipid levels  5. Patient advised healthy diet, regular exercise, safe sex  6. Pt advised social distancing , wear mask & wash hands / use hand   7.  Labs and follow-up in 6 months       Continue present medication(s)Genvoya  Begin medication(s): MVT , Vit D   Continue medication(s): Genvoya  Immunization(s) ordered: Menveo  Schedule labs: 6months  Patient education: safe sex, exercise, social distancing  Total time spent 35 minutes  Nikki Steve MD  [address]

## 2021-03-17 NOTE — TELEPHONE ENCOUNTER
Please mail lab slip and follow-up appointment for 6 months ( Virtual) . I reviewed the referral to psychotherapy. Please ask him to call and schedule.   ( Please include referral with lab slip in the send out)

## 2021-03-17 NOTE — PROGRESS NOTES
Reviewed record in preparation for visit and have obtained necessary documentation. Identified pt with two pt identifiers(name and ). No chief complaint on file. Health Maintenance Due   Topic Date Due    COVID-19 Vaccine (1) Never done    DTaP/Tdap/Td series (1 - Tdap) Never done    Flu Vaccine (1) 2020       Mr. Katherine Martin has a reminder for a \"due or due soon\" health maintenance. I have asked that he discuss health maintenance topic(s) due with His  primary care provider. Coordination of Care Questionnaire:  :     1) Have you been to an emergency room, urgent care clinic since your last visit? no   Hospitalized since your last visit? no             2) Have you seen or consulted any other health care providers outside of [address] [place] [address] since your last visit? no  (Include any pap smears or colon screenings in this section.)    Patient is accompanied by self I have received verbal consent from Arturo Paul III to discuss any/all medical information while they are present in the room.

## 2021-03-19 ENCOUNTER — TELEPHONE (OUTPATIENT)
Dept: FAMILY MEDICINE CLINIC | Age: 31
End: 2021-03-19

## 2021-03-19 NOTE — TELEPHONE ENCOUNTER
Patient is calling regarding a rx for Ensure, so he can get it through [facility]. Please call @[phone].

## 2021-03-23 ENCOUNTER — OFFICE VISIT (OUTPATIENT)
Dept: INTERNAL MEDICINE CLINIC | Age: 31
End: 2021-03-23

## 2021-03-23 VITALS
DIASTOLIC BLOOD PRESSURE: 68 MMHG | BODY MASS INDEX: 22.76 KG/M2 | RESPIRATION RATE: 14 BRPM | WEIGHT: 141.6 LBS | TEMPERATURE: 98 F | OXYGEN SATURATION: 96 % | HEART RATE: 85 BPM | SYSTOLIC BLOOD PRESSURE: 106 MMHG | HEIGHT: 66 IN

## 2021-03-23 DIAGNOSIS — G47.09 OTHER INSOMNIA: ICD-10-CM

## 2021-03-23 DIAGNOSIS — F32.A ANXIETY AND DEPRESSION: ICD-10-CM

## 2021-03-23 DIAGNOSIS — Z21 ASYMPTOMATIC HIV INFECTION (HCC): ICD-10-CM

## 2021-03-23 DIAGNOSIS — Z00.00 ANNUAL PHYSICAL EXAM: Primary | ICD-10-CM

## 2021-03-23 DIAGNOSIS — F41.9 ANXIETY AND DEPRESSION: ICD-10-CM

## 2021-03-23 DIAGNOSIS — E55.9 VITAMIN D DEFICIENCY: ICD-10-CM

## 2021-03-23 DIAGNOSIS — Z72.0 TOBACCO ABUSE: ICD-10-CM

## 2021-03-23 DIAGNOSIS — U07.1 COVID-19 VIRUS INFECTION: ICD-10-CM

## 2021-03-23 PROCEDURE — 99385 PREV VISIT NEW AGE 18-39: CPT | Performed by: INTERNAL MEDICINE

## 2021-03-23 RX ORDER — ESCITALOPRAM OXALATE 5 MG/1
5 TABLET ORAL DAILY
Qty: 90 TAB | Refills: 3 | Status: SHIPPED | OUTPATIENT
Start: 2021-03-23 | End: 2021-07-22

## 2021-03-23 NOTE — PROGRESS NOTES
Kimberley Calabrese is a 31 y.o. male who presents for evaluation of npv/cpe. No recent pcp, last saw me march 3, 2017. He has continued to follow with ID, dr Gautam Rosenberg for his HIV. Had been on zoloft, but stopped it due to nausea. He has not been on any meds for SARAN since, and feels like he needs some help there. 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 He also had covid infection in [place], but has recovered from that.       ROS:  Constitutional: negative for fevers, chills, anorexia and weight loss  Eyes:   negative for visual disturbance and irritation  ENT:   negative for tinnitus,sore throat,nasal congestion,ear pain,hoarseness  Respiratory:  negative for cough, hemoptysis, dyspnea,wheezing  CV:   negative for chest pain, palpitations, lower extremity edema  GI:   negative for nausea, vomiting, diarrhea, abdominal pain,melena  Genitourinary: negative for frequency, dysuria and hematuria  Musculoskel: negative for myalgias, arthralgias, back pain, muscle weakness, joint pain  Neurological:  negative for headaches, dizziness, focal weakness, numbness  Psychiatric:     ++ for depression or anxiety      Past Medical History:   Diagnosis Date    Asthma     Depression     Depression 8/22/2016    [facility]2013       Past Surgical History:   Procedure Laterality Date    HX WISDOM TEETH EXTRACTION         Family History   Problem Relation Age of Onset    Hypertension Mother     Diabetes Mother     Depression Mother     Depression Father     Hypertension Father        Social History     Socioeconomic History    Marital status: SINGLE     Spouse name: Not on file    Number of children: Not on file    Years of education: Not on file    Highest education level: Not on file   Occupational History    Not on file   Social Needs    Financial resource strain: Not on file    Food insecurity Worry: Not on file     Inability: Not on file    Transportation needs     Medical: Not on file     Non-medical: Not on file   Tobacco Use    Smoking status: Current Every Day Smoker     Packs/day: 0.50    Smokeless tobacco: Current User   Substance and Sexual Activity    Alcohol use: Yes     Frequency: 2-3 times a week     Drinks per session: 1 or 2     Comment: Occasionally    Drug use: No    Sexual activity: Yes     Partners: Male   Lifestyle    Physical activity     Days per week: Not on file     Minutes per session: Not on file    Stress: Not on file   Relationships    Social connections     Talks on phone: Not on file     Gets together: Not on file     Attends Amish service: Not on file     Active member of club or organization: Not on file     Attends meetings of clubs or organizations: Not on file     Relationship status: Not on file    Intimate partner violence     Fear of current or ex partner: Not on file     Emotionally abused: Not on file     Physically abused: Not on file     Forced sexual activity: Not on file   Other Topics Concern    Not on file   Social History Narrative    Not on file            Visit Vitals  /68 (BP 1 Location: Left upper arm, BP Patient Position: Sitting)   Pulse 85   Temp 98 °F (36.7 °C) (Temporal)   Resp 14   Ht 5' 6\" (1.676 m)   Wt 141 lb 9.6 oz (64.2 kg)   SpO2 96%   BMI 22.85 kg/m²       Physical Examination:   General - Well appearing male  HEENT - PERRL, TM no erythema/opacification, normal nasal turbinates, no oropharyngeal erythema or exudate, MMM  Neck - supple, no bruits, no thyroidomegaly, no lymphadenopathy  Pulm - clear to auscultation bilaterally  Cardio - RRR, normal S1 S2, no murmur  Abd - soft, nontender, no masses, no HSM  Extrem - no edema, +2 distal pulses  Neuro-  No focal deficits, CN intact     Assessment/Plan:    1. Annual cpe--check flp, tsh, a1c. Other labs done recently by ID looked ok.   2.  anx and depression--rx to start lexapro  3. Insomnia--hopefully lexapro helps here as well  4. Tobacco abuse--  5. HIV--follows with ID, on Genvoya    rtc 3 months.         Tobi Page III, DO

## 2021-03-23 NOTE — PATIENT INSTRUCTIONS
Recovering From Depression: Care Instructions Your Care Instructions Taking good care of yourself is important as you recover from depression. In time, your symptoms will fade as your treatment takes hold. Do not give up. Instead, focus your energy on getting better. Your mood will improve. It just takes some time. Focus on things that can help you feel better, such as being with friends and family, eating well, and getting enough rest. But take things slowly. Do not do too much too soon. You will begin to feel better gradually. Follow-up care is a key part of your treatment and safety. Be sure to make and go to all appointments, and call your doctor if you are having problems. It's also a good idea to know your test results and keep a list of the medicines you take. How can you care for yourself at home? Be realistic · If you have a large task to do, break it up into smaller steps you can handle, and just do what you can. · You may want to put off important decisions until your depression has lifted. If you have plans that will have a major impact on your life, such as marriage, divorce, or a job change, try to wait a bit. Talk it over with friends and loved ones who can help you look at the overall picture first. 
· Reaching out to people for help is important. Do not isolate yourself. Let your family and friends help you. Find someone you can trust and confide in, and talk to that person. · Be patient, and be kind to yourself. Remember that depression is not your fault and is not something you can overcome with willpower alone. Treatment is important for depression, just like for any other illness. Feeling better takes time, and your mood will improve little by little. Stay active · Stay busy and get outside. Take a walk, or try some other light exercise. · Talk with your doctor about an exercise program. Exercise can help with mild depression. · Go to a movie or concert.  Take part in a Buddhism activity or other social gathering. Go to a [employer] M[employer] game. · Ask a friend to have dinner with you. Take care of yourself · Eat a balanced diet with plenty of fresh fruits and vegetables, whole grains, and lean protein. If you have lost your appetite, eat small snacks rather than large meals. · Avoid using illegal drugs or marijuana and drinking alcohol. Do not take medicines that have not been prescribed for you. They may interfere with medicines you may be taking for depression, or they may make your depression worse. · Take your medicines exactly as they are prescribed. You may start to feel better within 1 to 3 weeks of taking antidepressant medicine. But it can take as many as 6 to 8 weeks to see more improvement. If you have questions or concerns about your medicines, or if you do not notice any improvement by 3 weeks, talk to your doctor. · Continue to take your medicine after your symptoms improve. Taking your medicine for at least 6 months after you feel better can help keep you from getting depressed again. If this isn't the first time you have been depressed, your doctor may recommend you to take medicine even longer. · If you have any side effects from your medicine, tell your doctor. Many side effects are mild and will go away on their own after you have been taking the medicine for a few weeks. Some may last longer. Talk to your doctor if side effects are bothering you too much. You might be able to try a different medicine. · Continue counseling. It may help prevent depression from returning, especially if you've had multiple episodes of depression. Talk with your counselor if you are having a hard time attending your sessions or you think the sessions aren't working. Don't just stop going. · Get enough sleep. Talk to your doctor if you are having problems sleeping. · Avoid sleeping pills unless they are prescribed by the doctor treating your depression.  Sleeping pills may make you groggy during the day, and they may interact with other medicine you are taking. · If you have any other illnesses, such as diabetes, heart disease, or high blood pressure, make sure to continue with your treatment. Tell your doctor about all of the medicines you take, including those with or without a prescription. · If you or someone you know talks about suicide, self-harm, or feeling hopeless, get help right away. Call the [address] [place] [address] at 6-751-892-HPGE ([phone]) or text HOME to 337434 to access the Crisis Text Line. Consider saving these numbers in your phone. When should you call for help? Call 911 anytime you think you may need emergency care. For example, call if: 
  · You feel like hurting yourself or someone else.  
  · Someone you know has depression and is about to attempt or is attempting suicide. Call your doctor now or seek immediate medical care if: 
  · You hear voices.  
  · Someone you know has depression and: 
? Starts to give away his or her possessions. ? Uses illegal drugs or drinks alcohol heavily. ? Talks or writes about death, including writing suicide notes or talking about guns, knives, or pills. ? Starts to spend a lot of time alone. ? Acts very aggressively or suddenly appears calm. Watch closely for changes in your health, and be sure to contact your doctor if: 
  · You do not get better as expected. Where can you learn more? Go to http://www.gray.com/ Enter S034 in the search box to learn more about \"Recovering From Depression: Care Instructions. \" Current as of: January 31, 2020               Content Version: 12.6 © 1079-5714 [employer]. Care instructions adapted under license by [employer] (which disclaims liability or warranty for this information).  If you have questions about a medical condition or this instruction, always ask your healthcare professional. Jose M Bell disclaims any warranty or liability for your use of this information. Get fasting labs done. Nothing to eat after MN. Try to take lexapro at bedtime, as I think it will also help you sleep.

## 2021-03-23 NOTE — TELEPHONE ENCOUNTER
Appointment scheduled and paperwork (referral and lab order) mailed. [facility]  Spoke with patient. Informed patient to call insurance company to see if they can refer patient to a psychotherapist.  Patient has an appointment to see PCP today. Patient advised to ask PCP for recommendation as well as Wil Dixon NP for [facility], does not have appointments available until January of 2022. Patient verbalized understanding of all above.

## 2021-05-03 DIAGNOSIS — F06.4 ANXIETY DISORDER DUE TO KNOWN PHYSIOLOGICAL CONDITION: ICD-10-CM

## 2021-05-03 DIAGNOSIS — F31.9 DEPRESSED BIPOLAR DISORDER (HCC): ICD-10-CM

## 2021-05-03 DIAGNOSIS — E55.9 VITAMIN D DEFICIENCY: ICD-10-CM

## 2021-05-03 DIAGNOSIS — Z23 ENCOUNTER FOR IMMUNIZATION: ICD-10-CM

## 2021-05-03 DIAGNOSIS — Z21 ASYMPTOMATIC HIV INFECTION (HCC): ICD-10-CM

## 2021-05-03 RX ORDER — ELVITEGRAVIR, COBICISTAT, EMTRICITABINE, AND TENOFOVIR ALAFENAMIDE 150; 150; 200; 10 MG/1; MG/1; MG/1; MG/1
TABLET ORAL
Qty: 30 TAB | Refills: 3 | Status: SHIPPED | OUTPATIENT
Start: 2021-05-03 | End: 2021-11-23 | Stop reason: SDUPTHER

## 2021-05-03 NOTE — TELEPHONE ENCOUNTER
Requested Prescriptions     Pending Prescriptions Disp Refills    elvitegravir-cobicistat-emtricitabine-tenofovir alafenamide (Genvoya) tab tablet 60 Tab 0     Sig: TAKE 1 TABLET BY MOUTH EVERY DAY WITH BREAKFAST     Last fill 3/1/21 for #60 w/ no addtnl refills  Last OV 3/17/21  Next OV 9/16/21    Musa Said, LPN

## 2021-05-09 RX ORDER — ESCITALOPRAM OXALATE 5 MG/1
5 TABLET ORAL DAILY
Qty: 90 TAB | Refills: 3 | Status: CANCELLED | OUTPATIENT
Start: 2021-05-09

## 2021-05-25 ENCOUNTER — CLINICAL SUPPORT (OUTPATIENT)
Dept: BEHAVIORAL/MENTAL HEALTH CLINIC | Age: 31
End: 2021-05-25

## 2021-05-25 DIAGNOSIS — Z13.9 ENCOUNTER FOR SPECIAL SCREENING EXAMINATION: Primary | ICD-10-CM

## 2021-05-25 PROCEDURE — 99484 CARE MGMT SVC BHVL HLTH COND: CPT
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
## 2021-05-27 ENCOUNTER — OFFICE VISIT (OUTPATIENT)
Dept: BEHAVIORAL/MENTAL HEALTH CLINIC | Age: 31
End: 2021-05-27

## 2021-05-27 DIAGNOSIS — F33.1 MAJOR DEPRESSIVE DISORDER, RECURRENT, MODERATE (HCC): Primary | ICD-10-CM

## 2021-05-27 DIAGNOSIS — F41.9 ANXIETY: ICD-10-CM

## 2021-05-27 DIAGNOSIS — F43.9 TRAUMA AND STRESSOR-RELATED DISORDER: ICD-10-CM

## 2021-05-27 PROCEDURE — 99204 OFFICE O/P NEW MOD 45 MIN: CPT | Performed by: NURSE PRACTITIONER

## 2021-05-27 RX ORDER — ARIPIPRAZOLE 2 MG/1
2 TABLET ORAL DAILY
Qty: 30 TABLET | Refills: 1 | Status: SHIPPED | OUTPATIENT
Start: 2021-05-27 | End: 2021-06-23

## 2021-05-27 RX ORDER — DOXEPIN HYDROCHLORIDE 10 MG/1
10 CAPSULE ORAL
Qty: 30 CAPSULE | Refills: 1 | Status: SHIPPED | OUTPATIENT
Start: 2021-05-27 | End: 2021-06-23 | Stop reason: SINTOL
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
## 2021-06-15 RX ORDER — CLONIDINE HYDROCHLORIDE 0.1 MG/1
TABLET ORAL
Qty: 30 TABLET | Refills: 1 | Status: SHIPPED | OUTPATIENT
Start: 2021-06-15 | End: 2021-10-05

## 2021-06-15 NOTE — PROGRESS NOTES
Discussed medication concerns with pt over phone. Pt states doxepin is not helping him completley with sleep and he is waking up with back pain. Pt wishes to stop taking. Consulted with provider who advised to trial 0.1 mg clonidine at bedtime for sleep and for nightmares, and okay to d/c doxepin at this time. Also discussed increasing Abilify at this time but pt states he actually just started taking it last Thursday. Advised pt to let office know how he is doing in a week or two and this can be discussed at that time, as well as efficacy of clonidine. Pt verbalized understanding and is in agreement with updated plan. Opportunity for questions was given and support was provided.     Kathlyn Litten, RN

## 2021-06-23 ENCOUNTER — PATIENT MESSAGE (OUTPATIENT)
Dept: BEHAVIORAL/MENTAL HEALTH CLINIC | Age: 31
End: 2021-06-23

## 2021-06-23 RX ORDER — OLANZAPINE 2.5 MG/1
2.5 TABLET ORAL
Qty: 30 TABLET | Refills: 1 | Status: SHIPPED | OUTPATIENT
Start: 2021-06-23 | End: 2021-10-05

## 2021-06-23 RX ORDER — DOXEPIN HYDROCHLORIDE 10 MG/1
10 CAPSULE ORAL
Qty: 30 CAPSULE | Refills: 1 | Status: SHIPPED | OUTPATIENT
Start: 2021-06-23 | End: 2021-07-22 | Stop reason: SDUPTHER

## 2021-06-23 NOTE — TELEPHONE ENCOUNTER
From: Kortney Victorai III  To: Kendra Javier NP  Sent: 6/23/2021 12:49 PM EDT  Subject: Prescription Question    Good afternoon, the [facility] department isn't able to get a lot of the prescriptions for me. I lied to Lucinda Sin about me taking the Abilify but they can't get the prescription for that. It has to be the generic brand. I did have to go back taking the other medication that you took me off of. I woke up Sunday with suicide thoughts. I ended up taking the medication and I felt a lot better. I have been taking it for three days now and I am in good spirits. I feel a lot better. What I have been doing is stretching my body so that my muscles won't get tight.

## 2021-06-23 NOTE — TELEPHONE ENCOUNTER
Made contact with pt via telephone to discuss medications. After consulting with provider, advised pt that okay to continue taking doxepin again since tolerating at this time. Also, due to Abilify not being on formulary with pt's insurance, will trial olanzapine 2.5 mg instead. Advised pt new prescriptions for both will be sent to his pharmacy. Pt verbalizes understanding and is in agreement with updated plan. Opportunity for questions was given and support was provided.       Edelmira Huerta RN

## 2021-07-22 ENCOUNTER — VIRTUAL VISIT (OUTPATIENT)
Dept: BEHAVIORAL/MENTAL HEALTH CLINIC | Age: 31
End: 2021-07-22

## 2021-07-22 DIAGNOSIS — F33.1 MAJOR DEPRESSIVE DISORDER, RECURRENT, MODERATE (HCC): Primary | ICD-10-CM

## 2021-07-22 DIAGNOSIS — F41.9 ANXIETY: ICD-10-CM

## 2021-07-22 DIAGNOSIS — F43.9 TRAUMA AND STRESSOR-RELATED DISORDER: ICD-10-CM

## 2021-07-22 PROCEDURE — 99213 OFFICE O/P EST LOW 20 MIN: CPT | Performed by: NURSE PRACTITIONER

## 2021-07-22 RX ORDER — DOXEPIN HYDROCHLORIDE 10 MG/1
10 CAPSULE ORAL
Qty: 30 CAPSULE | Refills: 1 | Status: SHIPPED | OUTPATIENT
Start: 2021-07-22 | End: 2021-10-05 | Stop reason: SDUPTHER

## 2021-07-22 NOTE — PROGRESS NOTES
CHIEF COMPLAINT:  Sánchez Coleman III is a 31 y.o. male and was seen today for follow-up of psychiatric condition and psychotropic medication management. HPI:    Umu Carr reports the following psychiatric symptoms by hx:  depression and anxiety. Overall symptoms have been present for months. Currently symptoms are of mild to moderate severity. The symptoms occur less frequently and less severely since starting current medications. Pt reports medications are beneficial. Met with pt via video telehealth for appt today to review current treatment plan. FAMILY/SOCIAL HX: psychosocial stressors    REVIEW OF SYSTEMS:  Psychiatric symptoms being monitored for:  depression, anxiety  Appetite:good   Sleep: improved   Neuro: no updates    There were no vitals taken for this visit.: virtual    Side Effects:  none    MENTAL STATUS EXAM:   Sensorium  oriented to time, place and person   Relations cooperative   Appearance:  age appropriate and casually dressed   Motor Behavior:  gait stable and within normal limits   Speech:  normal volume   Thought Process: goal directed   Thought Content free of delusions and free of hallucinations   Suicidal ideations no intention   Homicidal ideations no intention   Mood:  euthymic   Affect:  euthymic   Memory recent  adequate   Memory remote:  adequate   Concentration:  adequate   Abstraction:  abstract   Insight:  good   Reliability good   Judgment:  good     MEDICAL DECISION MAKING:  Problems addressed today:    ICD-10-CM ICD-9-CM    1. Major depressive disorder, recurrent, moderate (HCC)  F33.1 296.32    2. Anxiety  F41.9 300.00    3. Trauma and stressor-related disorder  F43.9 309.81      308.9        Assessment:   Umu Carr is responding to treatment. Symptoms are improving. Discussed current medications and dosages. No changes made today. May consider increasing doxepin at next appt. Reviewed treatment goals and target symptoms to monitor for. Plan:   1.     Current Outpatient Medications   Medication Sig Dispense Refill    doxepin (SINEquan) 10 mg capsule Take 1 Capsule by mouth nightly. 30 Capsule 1    OLANZapine (ZyPREXA) 2.5 mg tablet Take 1 Tablet by mouth nightly. 30 Tablet 1    cloNIDine HCL (CATAPRES) 0.1 mg tablet Take one tablet by mouth at bedtime. 30 Tablet 1    elvitegravir-cobicistat-emtricitabine-tenofovir alafenamide (Genvoya) tab tablet TAKE 1 TABLET BY MOUTH EVERY DAY WITH BREAKFAST 30 Tab 3          medication changes made today: cont olanzapine and doxepin    2. Counseling and coordination of care including instructions for treatment, risks/benefits, risk factor reduction and patient/family education. He agrees with the plan. Patient instructed to call with any side effects, questions or issues. 3.    Follow-up and Dispositions    · Return in about 3 months (around 10/22/2021). Follow-up and Disposition History        Trevinsusi Elin ROBBINS, who was evaluated through a synchronous (real-time) audio-video encounter, and/or his healthcare decision maker, is aware that it is a billable service, with coverage as determined by his insurance carrier. He provided verbal consent to proceed: Yes, and patient identification was verified. This visit was conducted pursuant to the emergency declaration under the [address] [place] [address], [address] [place] [address] authority and the [employer] General Act. A caregiver was present when appropriate. Ability to conduct physical exam was limited. The patient was located in a state where the provider was credentialed to provide care.          7/22/2021  Valeria Hurley NP

## 2021-09-16 ENCOUNTER — VIRTUAL VISIT (OUTPATIENT)
Dept: FAMILY MEDICINE CLINIC | Age: 31
End: 2021-09-16

## 2021-09-16 ENCOUNTER — TELEPHONE (OUTPATIENT)
Dept: FAMILY MEDICINE CLINIC | Age: 31
End: 2021-09-16

## 2021-09-16 DIAGNOSIS — E78.00 PURE HYPERCHOLESTEROLEMIA: ICD-10-CM

## 2021-09-16 DIAGNOSIS — F06.4 ANXIETY DISORDER DUE TO KNOWN PHYSIOLOGICAL CONDITION: ICD-10-CM

## 2021-09-16 DIAGNOSIS — E55.9 VITAMIN D DEFICIENCY: ICD-10-CM

## 2021-09-16 DIAGNOSIS — F31.9 DEPRESSED BIPOLAR DISORDER (HCC): ICD-10-CM

## 2021-09-16 DIAGNOSIS — U07.1 COVID-19 VIRUS INFECTION: ICD-10-CM

## 2021-09-16 DIAGNOSIS — Z21 ASYMPTOMATIC HIV INFECTION (HCC): Primary | ICD-10-CM

## 2021-09-16 DIAGNOSIS — R63.5 WEIGHT GAIN: ICD-10-CM

## 2021-09-16 PROCEDURE — 99214 OFFICE O/P EST MOD 30 MIN: CPT | Performed by: INTERNAL MEDICINE

## 2021-09-16 NOTE — PROGRESS NOTES
Virtual Visit  Send link to [phone]    Identified pt with two pt identifiers(name and ). Reviewed record in preparation for visit and have obtained necessary documentation. Chief Complaint   Patient presents with    Follow-up      There were no vitals filed for this visit. Health Maintenance Review: Patient reminded of \"due or due soon\" health maintenance. I have asked the patient to contact his/her primary care provider (PCP) for follow-up on his/her health maintenance. Coordination of Care Questionnaire:  :   1) Have you been to an emergency room, urgent care, or hospitalized since your last visit? If yes, where when, and reason for visit? no       2. Have seen or consulted any other health care provider since your last visit? If yes, where when, and reason for visit? NO      Patient is accompanied by self I have received verbal consent from Nupur Worthington III to discuss any/all medical information while they are present in the room.

## 2021-09-16 NOTE — TELEPHONE ENCOUNTER
Appointment scheduled for 3/17/2022 at 2pm for 6 month follow-up. Lab orders and appointment reminder mailed to patient's home address.     Karel Patterson LPN

## 2021-09-16 NOTE — PATIENT INSTRUCTIONS
[employer] Activation    Thank you for requesting access to Kumo. Please follow the instructions below to securely access and download your online medical record. Kumo allows you to send messages to your doctor, view your test results, renew your prescriptions, schedule appointments, and more. How Do I Sign Up? 1. In your internet browser, go to [email or web address]. 2. Click on the First Time User? Click Here link in the Sign In box. You will see the New Member Sign Up page. 3. Enter your Kumo Access Code exactly as it appears below. You will not need to use this code after youve completed the sign-up process. If you do not sign up before the expiration date, you must request a new code. Kumo Access Code: Activation code not generated  Current Kumo Status: Active (This is the date your Kumo access code will )    4. Enter the last four digits of your Social Security Number (xxxx) and Date of Birth (mm/dd/yyyy) as indicated and click Submit. You will be taken to the next sign-up page. 5. Create a Kumo ID. This will be your Kumo login ID and cannot be changed, so think of one that is secure and easy to remember. 6. Create a Kumo password. You can change your password at any time. 7. Enter your Password Reset Question and Answer. This can be used at a later time if you forget your password. 8. Enter your e-mail address. You will receive e-mail notification when new information is available in [address] 9. Click Sign Up. You can now view and download portions of your medical record. 10. Click the Download Summary menu link to download a portable copy of your medical information. Additional Information    If you have questions, please visit the Frequently Asked Questions section of the Kumo website at [email or web address] Remember, Kumo is NOT to be used for urgent needs. For medical emergencies, dial 911.

## 2021-09-16 NOTE — PROGRESS NOTES
Subjective:   Nancy Valencia III is a 31 y.o. male who is seen  for HIV follow up. Pt was seen by Virtual Visit  . Patient is doing well. Taking medications every day. Currently not sexually active, not in a relationship anymore   Pt is under less stress. He has anxiety/depression. Not able to sleep at night   Advised patient to see a therapist.  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 Patient was diagnosed with Covid disease in January. Patient had Covid vaccine later in January and second shot in February. Currently asymptomatic. Is wearing a mask, social distancing. Patient states he has gained >10 pounds. Patient advised to exercise eat healthy. Patient believes it is his psych medications causing weight gain. Patient advised that Mame Byrne could contribute as well. Patient does not want to change medication at this time  CD4 778.  48.6%. Even though CD4 count had dropped percentage and increased. Viral load undetectable . Low vitamin D 26.4. These labs were also discussed back during last appointment. Patient has not done recent labs due to not having insurance. Pursuant to the emergency declaration under the [facility] waiver authority and the [employer] Act, this Virtual Visit was conducted, with patient's consent, to reduce the patient's risk of exposure to COVID-19 and provide continuity of care for an established patient. Services were provided through a video synchronous discussion virtually to substitute for in-person visit. Pt is 100 per cent compliant with Genvoya . When was first positive test for HIV? September, 2013. [facility]  Previous HIV Negative Test: never  What was the reason for being tested?not feeling well.    Initial CD4 >200   Initial VL- dont remember  Mode of HIV infection:Homosexual contact     Date Value   Last CD4 3/5/2021 778   Last VL  3/5/2021 <20     Patient's usual source of health care:ID Specialist  Oppurtunistic Infections: none  Sexually transmitted diseases: none  Tuberculosis: Any known exposure to MTB  no  Anal Pap: not done   Immunizations by Immunization family     Hep A Vaccine 5/10/2016       Influenza Vaccine 12/3/2013 10/20/2015 3/3/2017 12/10/2019    Meningococcal ACWY Vaccine 6/11/2019 12/10/2019      Pneumococcal Conjugate (PCV) 7/12/2018       Pneumococcal Polysaccharide (PPSV-23) 3/3/2017       Sars-cov-2 (Covid-19) Vaccine, Unspecified  1/16/2021 2/6/2021      TB Skin Test (PPD) Intradermal 7/7/2014          Pt for Flu vccine, 2nd dose of Menveo  ROS  no specific complaints   Pt agreeable to meningo coccal vaccine    No Known Allergies    Current Outpatient Medications   Medication Sig Dispense Refill    doxepin (SINEquan) 10 mg capsule Take 1 Capsule by mouth nightly. 30 Capsule 1    cloNIDine HCL (CATAPRES) 0.1 mg tablet Take one tablet by mouth at bedtime. 30 Tablet 1    elvitegravir-cobicistat-emtricitabine-tenofovir alafenamide (Genvoya) tab tablet TAKE 1 TABLET BY MOUTH EVERY DAY WITH BREAKFAST 30 Tab 3    OLANZapine (ZyPREXA) 2.5 mg tablet Take 1 Tablet by mouth nightly. (Patient not taking: Reported on 9/16/2021) 30 Tablet 1       Past Medical History:   Diagnosis Date    Anxiety     Asthma     Depression     Depression 8/22/2016    [facility] 12/3/2013       Social History     Socioeconomic History    Marital status: SINGLE     Spouse name: Not on file    Number of children: Not on file    Years of education: Not on file    Highest education level: Not on file   Occupational History    Not on file   Tobacco Use    Smoking status: Current Some Day Smoker     Packs/day: 0.50     Types: Cigarettes    Smokeless tobacco: Current User   Vaping Use    Vaping Use: Never used   Substance and Sexual Activity    Alcohol use:  Yes     Alcohol/week: 14.0 standard drinks     Types: 2 Glasses of wine, 12 Cans of beer per week    Drug use: Yes     Types: Marijuana    Sexual activity: Yes     Partners: Male   Other Topics Concern    Not on file   Social History Narrative    Not on file     Social Determinants of Health     Financial Resource Strain:     Difficulty of Paying Living Expenses:    Food Insecurity:     Worried About Running Out of Food in the Last Year:     [address] in the Last Year:    Transportation Needs:     Lack of Transportation (Medical):  Lack of Transportation (Non-Medical):    Physical Activity:     Days of Exercise per Week:     Minutes of Exercise per Session:    Stress:     Feeling of Stress :    Social Connections:     Frequency of Communication with Friends and Family:     Frequency of Social Gatherings with Friends and Family:     Attends Congregational Services:     Active Member of Clubs or Organizations:     Attends Club or Organization Meetings:     Marital Status:    Intimate Partner Violence:     Fear of Current or Ex-Partner:     Emotionally Abused:     Physically Abused:     Sexually Abused:      Homosexual- yes  Drug used discussed yes  Is the patient sexually active? yes  Safer sex guidelines discussed yes  If yes, partner(s)  aware of patient's status? yes  Psychiatric history. depression and anxiety        Objective:     Physical exam:  Limited   GEN: NAD  NECK- supple  RESP: no distress  NEURO:non focal  No LE edema          Assessment:       IMPRESSION/ PLAN:   1. HIV well controlled on Genvoya po, CD4/ VL   778/ <20  2. Anxiety/depression /stress overall better . Patient is seeing psychiatry and therapist  3. Vit D deficiency, take supplement  daily  4. Hypercholesterolemia for lipid levels  5. Patient advised healthy diet, regular exercise, safe sex  6. Pt advised social distancing , wear mask & wash hands / use hand   7. Labs and follow-up in 6 months.        Continue present medication(s)Genvoya  Begin medication(s): MVT , Vit D   Continue medication(s): Genvoya  Immunization(s) ordered: Menveo  Schedule labs: 6months  Patient education: safe sex, exercise, social distancing  Total time spent 34 minutes  Tonya Monge MD  [address]

## 2021-10-05 ENCOUNTER — VIRTUAL VISIT (OUTPATIENT)
Dept: BEHAVIORAL/MENTAL HEALTH CLINIC | Age: 31
End: 2021-10-05

## 2021-10-05 DIAGNOSIS — F43.9 TRAUMA AND STRESSOR-RELATED DISORDER: ICD-10-CM

## 2021-10-05 DIAGNOSIS — F33.1 MAJOR DEPRESSIVE DISORDER, RECURRENT, MODERATE (HCC): Primary | ICD-10-CM

## 2021-10-05 DIAGNOSIS — F41.9 ANXIETY: ICD-10-CM

## 2021-10-05 PROCEDURE — 99213 OFFICE O/P EST LOW 20 MIN: CPT | Performed by: NURSE PRACTITIONER

## 2021-10-05 RX ORDER — DOXEPIN HYDROCHLORIDE 10 MG/1
10 CAPSULE ORAL
Qty: 30 CAPSULE | Refills: 5 | Status: SHIPPED | OUTPATIENT
Start: 2021-10-05 | End: 2022-04-06 | Stop reason: SDUPTHER

## 2021-10-05 NOTE — PROGRESS NOTES
CHIEF COMPLAINT:  Krystal Kiran III is a 31 y.o. male and was seen today for follow-up of psychiatric condition and psychotropic medication management. HPI:    Sanjuanita Aranda reports the following psychiatric symptoms by hx:  depression and anxiety. Overall symptoms have been present for months. Currently depression and anxiety symptoms of low severity. The symptoms occur less frequently and less severely. Pt reports medications are beneficial. He reports he ran out of olanzapine and so stopped use. He thinks symptoms are fine without it as overall he reports benefit from doxepin. Met with pt via video telehealth for appt today to review current treatment plan. FAMILY/SOCIAL HX: no new stressors reported    REVIEW OF SYSTEMS:  Psychiatric symptoms being monitored for:  depression, anxiety  Appetite:good   Sleep: improved       There were no vitals taken for this visit.: virtual    Side Effects:  none    MENTAL STATUS EXAM:   Sensorium  oriented to time, place and person   Relations cooperative   Appearance:  age appropriate and casually dressed   Motor Behavior:  gait stable and within normal limits   Speech:  normal volume   Thought Process: goal directed   Thought Content free of delusions and free of hallucinations   Suicidal ideations no intention   Homicidal ideations no intention   Mood:  within normal limits   Affect:  euthymic   Memory recent  adequate   Memory remote:  adequate   Concentration:  adequate   Abstraction:  abstract   Insight:  good   Reliability good   Judgment:  good     MEDICAL DECISION MAKING:  Problems addressed today:    ICD-10-CM ICD-9-CM    1. Major depressive disorder, recurrent, moderate (HCC)  F33.1 296.32    2. Anxiety  F41.9 300.00    3. Trauma and stressor-related disorder  F43.9 309.81      308.9        Assessment:   Sanjuanita Aranda is responding to treatment. Symptoms are stabilizing and improved. Discussed current medications and dosages. Will dc olanzapine and clonidine.  Will continue with doxepin and titrate if symptoms become exacerbated. Reviewed treatment goals and target symptoms to monitor for. Plan:   1. Current Outpatient Medications   Medication Sig Dispense Refill    doxepin (SINEquan) 10 mg capsule Take 1 Capsule by mouth nightly. 30 Capsule 1    OLANZapine (ZyPREXA) 2.5 mg tablet Take 1 Tablet by mouth nightly. (Patient not taking: Reported on 9/16/2021) 30 Tablet 1    cloNIDine HCL (CATAPRES) 0.1 mg tablet Take one tablet by mouth at bedtime. 30 Tablet 1    elvitegravir-cobicistat-emtricitabine-tenofovir alafenamide (Genvoya) tab tablet TAKE 1 TABLET BY MOUTH EVERY DAY WITH BREAKFAST 30 Tab 3          medication changes made today: dc olanzapine, cont doxepin, dc clonidine (can restart if needed)    2. Counseling and coordination of care including instructions for treatment, risks/benefits, risk factor reduction and patient/family education. He agrees with the plan. Patient instructed to call with any side effects, questions or issues. 3.    Follow-up and Dispositions    · Return in about 3 months (around 1/5/2022). [address] Mikejennifer Du Préselvie Soto, who was evaluated through a synchronous (real-time) audio-video encounter, and/or his healthcare decision maker, is aware that it is a billable service, with coverage as determined by his insurance carrier. He provided verbal consent to proceed: Yes, and patient identification was verified. This visit was conducted pursuant to the emergency declaration under the [facility], [address] [place] [address] authority and the [employer] and [employer] Act. A caregiver was present when appropriate. Ability to conduct physical exam was limited. The patient was located in a state where the provider was credentialed to provide care.          10/5/2021  Soy Wylie NP

## 2021-11-23 DIAGNOSIS — Z23 ENCOUNTER FOR IMMUNIZATION: ICD-10-CM

## 2021-11-23 DIAGNOSIS — F06.4 ANXIETY DISORDER DUE TO KNOWN PHYSIOLOGICAL CONDITION: ICD-10-CM

## 2021-11-23 DIAGNOSIS — Z21 ASYMPTOMATIC HIV INFECTION (HCC): ICD-10-CM

## 2021-11-23 DIAGNOSIS — E55.9 VITAMIN D DEFICIENCY: ICD-10-CM

## 2021-11-23 DIAGNOSIS — F31.9 DEPRESSED BIPOLAR DISORDER (HCC): ICD-10-CM

## 2021-11-23 RX ORDER — ELVITEGRAVIR, COBICISTAT, EMTRICITABINE, AND TENOFOVIR ALAFENAMIDE 150; 150; 200; 10 MG/1; MG/1; MG/1; MG/1
TABLET ORAL
Qty: 30 TABLET | Refills: 3 | Status: SHIPPED | OUTPATIENT
Start: 2021-11-23

## 2021-11-23 NOTE — TELEPHONE ENCOUNTER
Requested Prescriptions     Pending Prescriptions Disp Refills    elvitegravir-cobicistat-emtricitabine-tenofovir alafenamide (Genvoya) tab tablet 30 Tablet 3     Sig: TAKE 1 TABLET BY MOUTH EVERY DAY WITH BREAKFAST     Last fill 5/3/2021 for #30 w/ 3 addtnl refills  Last OV 9/16/2021  Next OV 3/17/2022    Leonard Colon LPN

## 2022-04-06 ENCOUNTER — VIRTUAL VISIT (OUTPATIENT)
Dept: BEHAVIORAL/MENTAL HEALTH CLINIC | Age: 32
End: 2022-04-06

## 2022-04-06 DIAGNOSIS — F41.9 ANXIETY: ICD-10-CM

## 2022-04-06 DIAGNOSIS — F33.1 MAJOR DEPRESSIVE DISORDER, RECURRENT, MODERATE (HCC): Primary | ICD-10-CM

## 2022-04-06 DIAGNOSIS — F43.9 TRAUMA AND STRESSOR-RELATED DISORDER: ICD-10-CM

## 2022-04-06 PROCEDURE — 99213 OFFICE O/P EST LOW 20 MIN: CPT | Performed by: NURSE PRACTITIONER

## 2022-04-06 RX ORDER — DOXEPIN HYDROCHLORIDE 25 MG/1
25 CAPSULE ORAL
Qty: 30 CAPSULE | Refills: 2 | Status: SHIPPED | OUTPATIENT
Start: 2022-04-06 | End: 2022-06-03 | Stop reason: SDUPTHER

## 2022-04-06 NOTE — PROGRESS NOTES
CHIEF COMPLAINT:  Janel Boyd III is a 27 y.o. male and was seen today for follow-up of psychiatric condition and psychotropic medication management. HPI:    Naveen Peter reports the following psychiatric symptoms by hx:  depression and anxiety. Overall symptoms have been present for months. Currently symptoms are of moderate severity. The symptoms occur somewhat more frequently and severely. Pt reports medications are of some benefit. Met with pt via video telehealth for appt today to review current treatment plan. FAMILY/SOCIAL HX: psychosocial stressors    REVIEW OF SYSTEMS:  Psychiatric symptoms being monitored for:  depression, anxiety  Appetite:decreased   Sleep: fitful   Neuro: none    There were no vitals taken for this visit.: virtual    Side Effects:  none    MENTAL STATUS EXAM:   Sensorium  oriented to time, place and person   Relations cooperative   Appearance:  age appropriate and casually dressed   Motor Behavior:  gait stable and within normal limits   Speech:  normal volume   Thought Process: goal directed   Thought Content free of delusions and free of hallucinations   Suicidal ideations no intention   Homicidal ideations no intention   Mood:  anxious and depressed   Affect:  anxious and depressed   Memory recent  adequate   Memory remote:  adequate   Concentration:  adequate   Abstraction:  abstract   Insight:  good   Reliability good   Judgment:  good     MEDICAL DECISION MAKING:  Problems addressed today:    ICD-10-CM ICD-9-CM    1. Major depressive disorder, recurrent, moderate (HCC)  F33.1 296.32    2. Anxiety  F41.9 300.00    3. Trauma and stressor-related disorder  F43.9 309.81      308.9        Assessment:   Naveen Peter is responding to treatment. Symptoms are exacerbated as pt temporarily stopped medication. Discussed current medications and dosages. Agreed to increase to 25 mg. Reviewed treatment goals and target symptoms to monitor for.  Reinforced impact of stressor/trauma and importance of self care. Plan:   1. Current Outpatient Medications   Medication Sig Dispense Refill    elvitegravir-cobicistat-emtricitabine-tenofovir alafenamide (Genvoya) tab tablet TAKE 1 TABLET BY MOUTH EVERY DAY WITH BREAKFAST 30 Tablet 3    doxepin (SINEquan) 10 mg capsule Take 1 Capsule by mouth nightly. 30 Capsule 5          medication changes made today: inc doxepin    2. Counseling and coordination of care including instructions for treatment, risks/benefits, risk factor reduction and patient/family education. He agrees with the plan. Patient instructed to call with any side effects, questions or issues. 3.    Follow-up and Dispositions    · Return in about 2 months (around 6/6/2022) for transitions. [address] Mikejennifer Du Pr[place], who was evaluated through a synchronous (real-time) audio-video encounter, and/or his healthcare decision maker, is aware that it is a billable service, which includes applicable co-pays, with coverage as determined by his insurance carrier. He provided verbal consent to proceed and patient identification was verified. This visit was conducted pursuant to the emergency declaration under the [facility], [facility] waiver authority and the [employer] General Act. A caregiver was present when appropriate. Ability to conduct physical exam was limited. The patient was located at home in a state where the provider was licensed to provide care.      4/6/2022  Danley Bumpers, NP

## 2022-04-21 ENCOUNTER — VIRTUAL VISIT (OUTPATIENT)
Dept: INFECTIOUS DISEASES | Age: 32
End: 2022-04-21

## 2022-04-21 DIAGNOSIS — Z21 ASYMPTOMATIC HIV INFECTION (HCC): Primary | ICD-10-CM

## 2022-04-21 DIAGNOSIS — F31.9 DEPRESSED BIPOLAR DISORDER (HCC): ICD-10-CM

## 2022-04-21 DIAGNOSIS — F06.4 ANXIETY DISORDER DUE TO KNOWN PHYSIOLOGICAL CONDITION: ICD-10-CM

## 2022-04-21 DIAGNOSIS — E55.9 VITAMIN D DEFICIENCY: ICD-10-CM

## 2022-04-21 PROCEDURE — 99214 OFFICE O/P EST MOD 30 MIN: CPT | Performed by: INTERNAL MEDICINE

## 2022-04-21 NOTE — PATIENT INSTRUCTIONS
v[employer]ansera Activation    Thank you for requesting access to Sagoon. Please follow the instructions below to securely access and download your online medical record. Sagoon allows you to send messages to your doctor, view your test results, renew your prescriptions, schedule appointments, and more. How Do I Sign Up? 1. In your internet browser, go to [email or web address]. 2. Click on the First Time User? Click Here link in the Sign In box. You will see the New Member Sign Up page. 3. Enter your Sagoon Access Code exactly as it appears below. You will not need to use this code after youve completed the sign-up process. If you do not sign up before the expiration date, you must request a new code. Sagoon Access Code: Activation code not generated  Current Sagoon Status: Active (This is the date your Sagoon access code will )    4. Enter the last four digits of your Social Security Number (xxxx) and Date of Birth (mm/dd/yyyy) as indicated and click Submit. You will be taken to the next sign-up page. 5. Create a Sagoon ID. This will be your Sagoon login ID and cannot be changed, so think of one that is secure and easy to remember. 6. Create a Sagoon password. You can change your password at any time. 7. Enter your Password Reset Question and Answer. This can be used at a later time if you forget your password. 8. Enter your e-mail address. You will receive e-mail notification when new information is available in [address] 9. Click Sign Up. You can now view and download portions of your medical record. 10. Click the Download Summary menu link to download a portable copy of your medical information. Additional Information    If you have questions, please visit the Frequently Asked Questions section of the Sagoon website at [email or web address] Remember, Sagoon is NOT to be used for urgent needs. For medical emergencies, dial 911.

## 2022-04-21 NOTE — PROGRESS NOTES
Subjective:   Radha Gomez III is a 27 y.o. male who is seen  for HIV follow up. Pt was seen by Virtual Visit  . Patient is doing well. Taking medications every day. Currently not sexually active, not in a relationship anymore   Pt is under less stress. He has had anxiety/depression. Not able to sleep at night  Patient has publishes work and feels better overall. Patient was diagnosed with Covid disease in January. Patient had Covid vaccine later in January and second shot in February. Currently asymptomatic. Is wearing a mask, social distancing when in crowds. Patient states he has gained > 5 pounds. Patient advised to exercise eat healthy. Patient believes it is his psych medications causing weight gain. Weight 146 lbs. Patient advised that Bill Snide could contribute as well. Patient does not want to change medication at this time  Labs discussed with patient. Component Ref Range & Units 3/5/21 1031 5/29/19 1606 11/9/17 1606 3/6/17 1203 9/12/16 1227 5/10/16 1137 10/20/15 0000   HIV-1 RNA by PCR copies/mL <20  <20 CM  <20 CM  <20 CM  <20 CM  <20 CM  <20 CM        Component Ref Range & Units 3/5/21 1031 3/5/21 1031 5/29/19 1606 4/17/18 0909 11/9/17 1606 3/6/17 1203 9/12/16 1227   Abs CD4 [place] 359 - 1,519 /uL 778   1,028   770  711  654    % CD 4 Pos Lymph 30.8 - 58.5 % 48.6   41.1   40.5  37.4  43.6          Low vitamin D 26.4. These labs were also discussed back during last appointment. Patient has not done labs. Patient will do new labs. Pursuant to the emergency declaration under the [facility], 1135 waiver authority and the [employer] and [employer] Act, this Virtual Visit was conducted, with patient's consent, to reduce the patient's risk of exposure to COVID-19 and provide continuity of care for an established patient.  Services were provided through a video synchronous discussion virtually to substitute for in-person visit. Pt is 100 per cent compliant with Genvoya . When was first positive test for HIV? September, 2013. [facility]  Previous HIV Negative Test: never  What was the reason for being tested?not feeling well. Initial CD4 >200   Initial VL- dont remember  Mode of HIV infection:Homosexual contact     Date Value   Last CD4  3/5/2021 778   Last VL  3/5/2021 <20     Patient's usual source of health care:ID Specialist  Oppurtunistic Infections: none  Sexually transmitted diseases: none  Tuberculosis: Any known exposure to MTB  no  Anal Pap: not done   Immunizations by Immunization family     COVID-19, [place] Vaccine, Vaccine Unspecified 1/16/2021 2/6/2021 11/2/2021     Hep A Vaccine 5/10/2016       Influenza Vaccine 12/3/2013 10/20/2015 3/3/2017 12/10/2019    Meningococcal ACWY Vaccine 6/11/2019 12/10/2019      Pneumococcal Conjugate (PCV) 7/12/2018       Pneumococcal Polysaccharide (PPSV-23) 3/3/2017       TB Skin Test (PPD) Intradermal 7/7/2014            ROS  no specific complaints   Pt agreeable to meningo coccal vaccine    No Known Allergies    Current Outpatient Medications   Medication Sig Dispense Refill    doxepin (SINEquan) 25 mg capsule Take 1 Capsule by mouth nightly.  30 Capsule 2    elvitegravir-cobicistat-emtricitabine-tenofovir alafenamide (Genvoya) tab tablet TAKE 1 TABLET BY MOUTH EVERY DAY WITH BREAKFAST 30 Tablet 3       Past Medical History:   Diagnosis Date    Anxiety     Asthma     Depression     Depression 8/22/2016    [facility]       Social History     Socioeconomic History    Marital status: SINGLE     Spouse name: Not on file    Number of children: Not on file    Years of education: Not on file    Highest education level: Not on file   Occupational History    Not on file   Tobacco Use    Smoking status: Current Some Day Smoker     Packs/day: 0.50     Types: Cigarettes    Smokeless tobacco: Current User   Vaping Use    Vaping Use: Never used   Substance and Sexual Activity    Alcohol use: Yes     Alcohol/week: 14.0 standard drinks     Types: 2 Glasses of wine, 12 Cans of beer per week    Drug use: Yes     Types: Marijuana    Sexual activity: Yes     Partners: Male   Other Topics Concern    Not on file   Social History Narrative    Not on file     Social Determinants of Health     Financial Resource Strain:     Difficulty of Paying Living Expenses: Not on file   Food Insecurity:     Worried About Running Out of Food in the Last Year: Not on file    Milo of Food in the Last Year: Not on file   Transportation Needs:     Lack of Transportation (Medical): Not on file    Lack of Transportation (Non-Medical): Not on file   Physical Activity:     Days of Exercise per Week: Not on file    Minutes of Exercise per Session: Not on file   Stress:     Feeling of Stress : Not on file   Social Connections:     Frequency of Communication with Friends and Family: Not on file    Frequency of Social Gatherings with Friends and Family: Not on file    Attends Nondenominational Services: Not on file    Active Member of [address] [place] [address] or Organizations: Not on file    Attends Club or Organization Meetings: Not on file    Marital Status: Not on file   Intimate Partner Violence:     Fear of Current or Ex-Partner: Not on file    Emotionally Abused: Not on file    Physically Abused: Not on file    Sexually Abused: Not on file   Housing Stability:     Unable to Pay for Housing in the Last Year: Not on file    Number of [place] in the Last Year: Not on file    Unstable Housing in the Last Year: Not on file     Homosexual- yes  Drug used discussed yes  Is the patient sexually active? yes  Safer sex guidelines discussed yes  If yes, partner(s)  aware of patient's status? yes  Psychiatric history. depression and anxiety        Objective:     Physical exam:  Limited   GEN: NAD  NECK- supple  RESP: no distress  NEURO:non focal  No LE edema          Assessment:       IMPRESSION/ PLAN:   1. HIV well controlled on Genvoya po, CD4/ VL   778/ <20  2. Anxiety/depression /stress overall better . Patient is seeing psychiatry and therapist  3. Vit D deficiency, take supplement  daily  4. Hypercholesterolemia for lipid levels  5. Patient advised healthy diet, regular exercise, safe sex  6. Pt advised social distancing , wear mask & wash hands / use hand   7. Labs and follow-up in 6 months.        Continue present medication(s)Genvoya  Begin medication(s): MVT , Vit D   Continue medication(s): Genvoya  Immunization(s) ordered: Menveo  Schedule labs: 6months  Patient education: safe sex, exercise, social distancing  Total time spent 34 minutes  Ronda Lopez MD  [address]

## 2022-04-21 NOTE — PROGRESS NOTES
Chief Complaint   Patient presents with    HIV     6m f/u [phone], Pt in [place]       1. Have you been to the ER, urgent care clinic since your last visit? Hospitalized since your last visit? [facility], cough, neg for Covid. Pt no longer has cough    2. Have you seen or consulted any other health care providers outside of the [address] [place] [address] since your last visit? Include any pap smears or colon screening.  No

## 2022-05-05 LAB
HIV1 RNA # SERPL NAA+PROBE: <20 COPIES/ML
HIV1 RNA SERPL NAA+PROBE-LOG#: NORMAL LOG10COPY/ML

## 2022-05-06 LAB
GAMMA INTERFERON BACKGROUND BLD IA-ACNC: 0.06 IU/ML
M TB IFN-G BLD-IMP: NEGATIVE
M TB IFN-G CD4+ BCKGRND COR BLD-ACNC: 0.04 IU/ML
MITOGEN IGNF BLD-ACNC: >10 IU/ML
QUANTIFERON TB2 AG: 0.05 IU/ML
SERVICE CMNT-IMP: NORMAL

## 2022-05-11 LAB
BASOPHILS # BLD AUTO: 0 X10E3/UL (ref 0–0.2)
BASOPHILS NFR BLD AUTO: 1 %
CD3+CD4+ CELLS # BLD: 615 /UL (ref 359–1519)
CD3+CD4+ CELLS NFR BLD: 43.9 % (ref 30.8–58.5)
EOSINOPHIL # BLD AUTO: 0 X10E3/UL (ref 0–0.4)
EOSINOPHIL NFR BLD AUTO: 1 %
ERYTHROCYTE [DISTWIDTH] IN BLOOD BY AUTOMATED COUNT: 13.3 % (ref 11.6–15.4)
HBV SURFACE AB SER QL: REACTIVE
HCT VFR BLD AUTO: 39 % (ref 37.5–51)
HCV AB S/CO SERPL IA: 0.2 S/CO RATIO (ref 0–0.9)
HGB BLD-MCNC: 13 G/DL (ref 13–17.7)
IMM GRANULOCYTES # BLD AUTO: 0 X10E3/UL (ref 0–0.1)
IMM GRANULOCYTES NFR BLD AUTO: 0 %
LYMPHOCYTES # BLD AUTO: 1.4 X10E3/UL (ref 0.7–3.1)
LYMPHOCYTES NFR BLD AUTO: 44 %
MCH RBC QN AUTO: 31.4 PG (ref 26.6–33)
MCHC RBC AUTO-ENTMCNC: 33.3 G/DL (ref 31.5–35.7)
MCV RBC AUTO: 94 FL (ref 79–97)
MONOCYTES # BLD AUTO: 0.3 X10E3/UL (ref 0.1–0.9)
MONOCYTES NFR BLD AUTO: 9 %
NEUTROPHILS # BLD AUTO: 1.4 X10E3/UL (ref 1.4–7)
NEUTROPHILS NFR BLD AUTO: 45 %
PLATELET # BLD AUTO: 207 X10E3/UL (ref 150–450)
RBC # BLD AUTO: 4.14 X10E6/UL (ref 4.14–5.8)
WBC # BLD AUTO: 3.1 X10E3/UL (ref 3.4–10.8)

## 2022-06-03 RX ORDER — DOXEPIN HYDROCHLORIDE 25 MG/1
25 CAPSULE ORAL
Qty: 30 CAPSULE | Refills: 4 | Status: SHIPPED | OUTPATIENT
Start: 2022-06-03

## 2022-06-29 ENCOUNTER — TELEPHONE (OUTPATIENT)
Dept: INFECTIOUS DISEASES | Age: 32
End: 2022-06-29

## 2022-06-29 NOTE — TELEPHONE ENCOUNTER
Please let patient know that if he needs to be out from work and requires note from me I would need to see him tomorrow, in person or virtual.  I could do a visit that 11:30 AM tomorrow Thursday 6/ 30. If patient is agreeable please add him to my schedule.   Thanks

## 2022-06-30 ENCOUNTER — VIRTUAL VISIT (OUTPATIENT)
Dept: INFECTIOUS DISEASES | Age: 32
End: 2022-06-30

## 2022-06-30 DIAGNOSIS — Z21 ASYMPTOMATIC HIV INFECTION (HCC): Primary | ICD-10-CM

## 2022-06-30 DIAGNOSIS — F06.4 ANXIETY DISORDER DUE TO KNOWN PHYSIOLOGICAL CONDITION: ICD-10-CM

## 2022-06-30 DIAGNOSIS — F31.9 DEPRESSED BIPOLAR DISORDER (HCC): ICD-10-CM

## 2022-06-30 DIAGNOSIS — F32.A FATIGUE DUE TO DEPRESSION: ICD-10-CM

## 2022-06-30 DIAGNOSIS — E55.9 VITAMIN D DEFICIENCY: ICD-10-CM

## 2022-06-30 DIAGNOSIS — R53.83 FATIGUE DUE TO DEPRESSION: ICD-10-CM

## 2022-06-30 PROCEDURE — 99214 OFFICE O/P EST MOD 30 MIN: CPT | Performed by: INTERNAL MEDICINE

## 2022-06-30 NOTE — PROGRESS NOTES
Subjective:   Kaitlyn Rodriguez III is a 27 y.o. male who is seen  for HIV follow up HIV and fatigue. Pt was seen by Virtual Visit  . Patient is doing well from HIV standpoint. Taking medications every day. Pt has been very tired on waking . He is on doxepin. Pt has been on Doxepin since August 2021. Pt is having hemorrhoids. Pt advised to see PCP regarding hemorrhoids  Patient is drinking 1 to 2 glasses of wine. Patient advised to reduce wine intake. It could be contributing to his fatigue & interacting with doxepin  Patient is also smoking 1 to 3 cigarettes daily. Patient is s/p covid vaccines & 1 booster  Is wearing a mask, social distancing when in crowds. Patient is exercising-walking. Also trying to eat healthy  Labs discussed with patient. Pt advised to discuss doxepin side effects with Pharmacy & Psychiatry. Patient would like letter to be off from work for 1 day. He will come and  later. Component Ref Range & Units 5/3/22 0000 3/5/21 1031 5/29/19 1606 11/9/17 1606 3/6/17 1203 9/12/16 1227 5/10/16 1137   HIV-1 RNA by PCR copies/mL <20  <20 CM  <20 CM  <20 CM  <20 CM  <20 CM  <20 CM          Component Ref Range & Units 5/2/22 0000 3/5/21 1031 3/5/21 1031 5/29/19 1606 4/17/18 0909 11/9/17 1606 3/6/17 1203   Abs CD4 [place] 359 - 1,519 /uL 615  778   1,028   770  711    % CD 4 Pos Lymph 30.8 - 58.5 % 43.9  48.6 CM   41.1   40.5  37.4    WBC 3.4 - 10.8 x10E3/uL 3.1 Low   3.4  3.6 Low  R  4.7  6.7 R, CM  5.1  3.5      Metabolic panel not done. Pursuant to the emergency declaration under the [facility], 1135 waiver authority and the [employer] and [employer] Act, this Virtual Visit was conducted, with patient's consent, to reduce the patient's risk of exposure to COVID-19 and provide continuity of care for an established patient.  Services were provided through a video synchronous discussion virtually to substitute for in-person visit. Pt is 100 per cent compliant with Genvoya . When was first positive test for HIV? September, 2013. [facility]  Previous HIV Negative Test: never  What was the reason for being tested?not feeling well. Initial CD4 >200   Initial VL- dont remember  Mode of HIV infection:Homosexual contact     Date Value   Last CD4  5/3/22 615   Last VL  5/3/22 <20     Patient's usual source of health care:ID Specialist  Oppurtunistic Infections: none  Sexually transmitted diseases: none  Tuberculosis: Any known exposure to MTB  no  Anal Pap: not done   Immunizations by Immunization family     COVID-19, [place] Vaccine, Vaccine Unspecified 1/16/2021 2/6/2021 11/2/2021     Hep A Vaccine 5/10/2016       Influenza Vaccine 12/3/2013 10/20/2015 3/3/2017 12/10/2019    Meningococcal ACWY Vaccine 6/11/2019 12/10/2019      Pneumococcal Conjugate (PCV) 7/12/2018       Pneumococcal Polysaccharide (PPSV-23) 3/3/2017       TB Skin Test (PPD) Intradermal 7/7/2014            ROS  no specific complaints   Pt agreeable to meningo coccal vaccine    No Known Allergies    Current Outpatient Medications   Medication Sig Dispense Refill    doxepin (SINEquan) 25 mg capsule Take 1 Capsule by mouth nightly.  30 Capsule 4    elvitegravir-cobicistat-emtricitabine-tenofovir alafenamide (Genvoya) tab tablet TAKE 1 TABLET BY MOUTH EVERY DAY WITH BREAKFAST 30 Tablet 3       Past Medical History:   Diagnosis Date    Anxiety     Asthma     Depression     Depression 8/22/2016    [facility]       Social History     Socioeconomic History    Marital status: SINGLE     Spouse name: Not on file    Number of children: Not on file    Years of education: Not on file    Highest education level: Not on file   Occupational History    Not on file   Tobacco Use    Smoking status: Current Some Day Smoker     Packs/day: 0.50     Types: Cigarettes    Smokeless tobacco: Current User Vaping Use    Vaping Use: Never used   Substance and Sexual Activity    Alcohol use: Yes     Alcohol/week: 14.0 standard drinks     Types: 2 Glasses of wine, 12 Cans of beer per week    Drug use: Yes     Types: Marijuana    Sexual activity: Yes     Partners: Male   Other Topics Concern    Not on file   Social History Narrative    Not on file     Social Determinants of Health     Financial Resource Strain:     Difficulty of Paying Living Expenses: Not on file   Food Insecurity:     Worried About Running Out of Food in the Last Year: Not on file    Milo of Food in the Last Year: Not on file   Transportation Needs:     Lack of Transportation (Medical): Not on file    Lack of Transportation (Non-Medical): Not on file   Physical Activity:     Days of Exercise per Week: Not on file    Minutes of Exercise per Session: Not on file   Stress:     Feeling of Stress : Not on file   Social Connections:     Frequency of Communication with Friends and Family: Not on file    Frequency of Social Gatherings with Friends and Family: Not on file    Attends Congregation Services: Not on file    Active Member of [address] [place] [address] or Organizations: Not on file    Attends Club or Organization Meetings: Not on file    Marital Status: Not on file   Intimate Partner Violence:     Fear of Current or Ex-Partner: Not on file    Emotionally Abused: Not on file    Physically Abused: Not on file    Sexually Abused: Not on file   Housing Stability:     Unable to Pay for Housing in the Last Year: Not on file    Number of [place] in the Last Year: Not on file    Unstable Housing in the Last Year: Not on file     Homosexual- yes  Drug used discussed yes  Is the patient sexually active? yes  Safer sex guidelines discussed yes  If yes, partner(s)  aware of patient's status? yes  Psychiatric history. depression and anxiety        Objective:     Physical exam:  Limited   GEN: NAD  NECK- supple  RESP: no distress  NEURO:non focal  No LE edema          Assessment:       IMPRESSION/ PLAN:   1. HIV well controlled on Genvoya po, CD4/ VL   615/<20  2. Pt reports excessive fatigue - pt on doxepin for depression. ?  Cause of symptoms. Patient also reports that his workload is very heavy. Patient requesting day off from work. Patient to be provided with letter. 3. Drop in CD4 count? Medication related. Patient to discuss with his pharmacist and psychiatry  4. Patient consuming 1 to 2 glasses of alcohol daily-advised to quit  5. Patient smoking 1 to 3 cigarettes daily, satiation advised  6. Anxiety/depression . Patient is seeing psychiatry and therapist  7. Vit D deficiency, take supplement  daily  8. Hypercholesterolemia for lipid levels  9. Patient advised healthy diet, regular exercise, safe sex  8. Pt is s/p COVID-vaccine x2 and booster  11. Labs and follow-up 6 months.        Continue present medication(s)Genvoya  Begin medication(s): MVT , Vit D   Continue medication(s): Genvoya  Immunization(s) ordered: Menveo  Schedule labs: 6months  Patient education: safe sex, exercise, social distancing  Total time spent 34 minutes  Reno Dixon MD  [address]

## 2022-06-30 NOTE — PROGRESS NOTES
Chief Complaint   Patient presents with    Medication Evaluation     Having tiredness in morning    After taking Doxepin

## 2022-06-30 NOTE — PATIENT INSTRUCTIONS
ChinacarsharInmagic Activation    Thank you for requesting access to Tiempy. Please follow the instructions below to securely access and download your online medical record. Tiempy allows you to send messages to your doctor, view your test results, renew your prescriptions, schedule appointments, and more. How Do I Sign Up? 1. In your internet browser, go to [email or web address]. 2. Click on the First Time User? Click Here link in the Sign In box. You will see the New Member Sign Up page. 3. Enter your Tiempy Access Code exactly as it appears below. You will not need to use this code after youve completed the sign-up process. If you do not sign up before the expiration date, you must request a new code. Tiempy Access Code: Activation code not generated  Current Tiempy Status: Active (This is the date your Tiempy access code will )    4. Enter the last four digits of your Social Security Number (xxxx) and Date of Birth (mm/dd/yyyy) as indicated and click Submit. You will be taken to the next sign-up page. 5. Create a Tiempy ID. This will be your Tiempy login ID and cannot be changed, so think of one that is secure and easy to remember. 6. Create a Tiempy password. You can change your password at any time. 7. Enter your Password Reset Question and Answer. This can be used at a later time if you forget your password. 8. Enter your e-mail address. You will receive e-mail notification when new information is available in [address] 9. Click Sign Up. You can now view and download portions of your medical record. 10. Click the Download Summary menu link to download a portable copy of your medical information. Additional Information    If you have questions, please visit the Frequently Asked Questions section of the Tiempy website at [email or web address] Remember, Tiempy is NOT to be used for urgent needs. For medical emergencies, dial 911.

## 2022-10-20 ENCOUNTER — TELEPHONE (OUTPATIENT)
Dept: FAMILY MEDICINE CLINIC | Age: 32
End: 2022-10-20

## 2022-10-20 NOTE — TELEPHONE ENCOUNTER
Pls call patient to r/s his 1:00 appt today     States he thought it was VV       Best number to reach him is [phone]

## 2022-10-20 NOTE — TELEPHONE ENCOUNTER
Pt stated he will need to reschedule, he can not do a virtual because he is at work. Pt request that his labs be faxed over to [phone]. I will print and fax them over. Pt request his new appt be left on his voicemail if he do not answer.

## 2023-01-10 DIAGNOSIS — E55.9 VITAMIN D DEFICIENCY: ICD-10-CM

## 2023-01-10 DIAGNOSIS — Z21 ASYMPTOMATIC HIV INFECTION (HCC): ICD-10-CM

## 2023-01-10 DIAGNOSIS — Z23 ENCOUNTER FOR IMMUNIZATION: ICD-10-CM

## 2023-01-10 DIAGNOSIS — F31.9 DEPRESSED BIPOLAR DISORDER (HCC): ICD-10-CM

## 2023-01-10 DIAGNOSIS — F06.4 ANXIETY DISORDER DUE TO KNOWN PHYSIOLOGICAL CONDITION: ICD-10-CM

## 2023-01-10 RX ORDER — ELVITEGRAVIR, COBICISTAT, EMTRICITABINE, AND TENOFOVIR ALAFENAMIDE 150; 150; 200; 10 MG/1; MG/1; MG/1; MG/1
TABLET ORAL
Qty: 30 TABLET | Refills: 3 | Status: SHIPPED | OUTPATIENT
Start: 2023-01-10

## 2023-01-19 ENCOUNTER — PATIENT MESSAGE (OUTPATIENT)
Dept: INFECTIOUS DISEASES | Age: 33
End: 2023-01-19

## 2023-01-19 ENCOUNTER — VIRTUAL VISIT (OUTPATIENT)
Dept: INFECTIOUS DISEASES | Age: 33
End: 2023-01-19

## 2023-01-19 DIAGNOSIS — F31.9 DEPRESSED BIPOLAR DISORDER (HCC): ICD-10-CM

## 2023-01-19 DIAGNOSIS — Z21 ASYMPTOMATIC HIV INFECTION (HCC): Primary | ICD-10-CM

## 2023-01-19 DIAGNOSIS — R53.83 FATIGUE DUE TO DEPRESSION: ICD-10-CM

## 2023-01-19 DIAGNOSIS — U07.1 COVID-19: ICD-10-CM

## 2023-01-19 DIAGNOSIS — F06.4 ANXIETY DISORDER DUE TO KNOWN PHYSIOLOGICAL CONDITION: ICD-10-CM

## 2023-01-19 DIAGNOSIS — E55.9 VITAMIN D DEFICIENCY: ICD-10-CM

## 2023-01-19 DIAGNOSIS — F32.A FATIGUE DUE TO DEPRESSION: ICD-10-CM

## 2023-01-19 PROCEDURE — 99214 OFFICE O/P EST MOD 30 MIN: CPT | Performed by: INTERNAL MEDICINE

## 2023-01-19 NOTE — PROGRESS NOTES
Subjective:   Nicolas Thorne III is a 34 y.o. male who is seen  for HIV follow up HIV and fatigue. Pt was seen by Virtual Visit  . Patient is doing well from HIV standpoint. Taking medications every day. Pt has had fever, chills, cough. She has tested positive for COVID. Patient has no shortness of breath except when walking. Patient advised to go to ED if symptoms get worse. Patient states he is feeling better overall. Patient is s/p covid vaccines & 2 booster. Is wearing a mask, social distancing when at work place. Patient is smoking much less, 1 to 2 cigarettes daily  Occasional alcohol consumption. Patient advised he would require to do labs. Patient requires PPSV23 vaccine. To be given next visit. Component Ref Range & Units 5/3/22 0000 3/5/21 1031 5/29/19 1606 11/9/17 1606 3/6/17 1203 9/12/16 1227 5/10/16 1137   HIV-1 RNA by PCR copies/mL <20  <20 CM  <20 CM  <20 CM  <20 CM  <20 CM  <20 CM          Component Ref Range & Units 5/2/22 0000 3/5/21 1031 3/5/21 1031 5/29/19 1606 4/17/18 0909 11/9/17 1606 3/6/17 1203   Abs CD4 [place] 359 - 1,519 /uL 615  778   1,028   770  711    % CD 4 Pos Lymph 30.8 - 58.5 % 43.9  48.6 CM   41.1   40.5  37.4    WBC 3.4 - 10.8 x10E3/uL 3.1 Low   3.4  3.6 Low  R  4.7  6.7 R, CM  5.1  3.5      Metabolic panel not done. Pursuant to the emergency declaration under the [facility], 1135 waiver authority and the [employer] Act, this Virtual Visit was conducted, with patient's consent, to reduce the patient's risk of exposure to COVID-19 and provide continuity of care for an established patient. Services were provided through a video synchronous discussion virtually to substitute for in-person visit. Pt is 100 per cent compliant with Genvoya . When was first positive test for HIV? September, 2013.  [facility]  Previous HIV Negative Test: never  What was the reason for being tested?not feeling well. Initial CD4 >200   Initial VL- dont remember  Mode of HIV infection:Homosexual contact     Date Value   Last CD4  5/3/22 615   Last VL  5/3/22 <20     Patient's usual source of health care:ID Specialist  Oppurtunistic Infections: none  Sexually transmitted diseases: none  Tuberculosis: Any known exposure to MTB  no  Anal Pap: not done   Immunizations by Immunization family       COVID-19, [place] Vaccine, Vaccine Unspecified 1/16/2021 2/6/2021 11/2/2021     Hep A Vaccine 5/10/2016       Influenza Vaccine 12/3/2013 10/20/2015 3/3/2017 12/10/2019    Meningococcal ACWY Vaccine 6/11/2019 12/10/2019      Pneumococcal Conjugate (PCV) 7/12/2018       Pneumococcal Polysaccharide (PPSV-23) 3/3/2017       TB Skin Test (PPD) Intradermal 7/7/2014              ROS  no specific complaints   Pt agreeable to meningo coccal vaccine    No Known Allergies    Current Outpatient Medications   Medication Sig Dispense Refill    elvitegravir-cobicistat-emtricitabine-tenofovir alafenamide (Genvoya) tab tablet TAKE 1 TABLET BY MOUTH EVERY DAY WITH BREAKFAST 30 Tablet 3    doxepin (SINEquan) 25 mg capsule Take 1 Capsule by mouth nightly. 30 Capsule 4       Past Medical History:   Diagnosis Date    Anxiety     Asthma     Depression     Depression 8/22/2016    [facility] 12/3/2013       Social History     Socioeconomic History    Marital status: SINGLE     Spouse name: Not on file    Number of children: Not on file    Years of education: Not on file    Highest education level: Not on file   Occupational History    Not on file   Tobacco Use    Smoking status: Some Days     Packs/day: 0.50     Types: Cigarettes    Smokeless tobacco: Current   Vaping Use    Vaping Use: Never used   Substance and Sexual Activity    Alcohol use:  Yes     Alcohol/week: 14.0 standard drinks     Types: 2 Glasses of wine, 12 Cans of beer per week    Drug use: Yes     Types: Marijuana Sexual activity: Yes     Partners: Male   Other Topics Concern    Not on file   Social History Narrative    Not on file     Social Determinants of Health     Financial Resource Strain: Not on file   Food Insecurity: Not on file   Transportation Needs: Not on file   Physical Activity: Not on file   Stress: Not on file   Social Connections: Not on file   Intimate Partner Violence: Not on file   Housing Stability: Not on file     Homosexual- yes  Drug used discussed yes  Is the patient sexually active? yes  Safer sex guidelines discussed yes  If yes, partner(s)  aware of patient's status? yes  Psychiatric history. depression and anxiety        Objective:     Physical exam:  Limited   GEN: NAD  NECK- supple  RESP: no distress  NEURO:non focal  No LE edema          Assessment:       IMPRESSION/ PLAN:   HIV well controlled on Genvoya po, CD4/ VL   615/<20  COVID-positive, some SOB on exertion. Patient to go to ED if symptoms get worse  Patient has reduce alcohol to occasional wine about once a month  Patient smoking 1 - 3 cigarettes daily, cessation advised  Anxiety/depression . Patient is seeing psychiatry and therapist  Vit D deficiency, take supplement  daily  Hypercholesterolemia for lipid levels  Patient advised healthy diet, regular exercise, safe sex  Pt is s/p COVID-vaccine x2 and booster x 2  Labs and follow-up 6 months.   PPSV23 booster in office next visit       Continue present medication(s)Genvoya  Begin medication(s): MVT , Vit D   Continue medication(s): Genvoya  Immunization(s) ordered: Menveo  Schedule labs: 6months  Patient education: safe sex, exercise, social distancing  Total time spent 34 minutes  Devora Lynn MD  [address]

## 2023-01-19 NOTE — TELEPHONE ENCOUNTER
Next appointment for patient 7/10 at 1 PM-in person  Patient will need to get PPSV23 vaccine  Please notify him. Patient has not done previously ordered labs. So I am unable to do new labs. Please remind him to do blood work once he is better from [facility].   Thanks

## 2023-01-19 NOTE — PATIENT INSTRUCTIONS
[employer] Activation    Thank you for requesting access to [employer]. Please follow the instructions below to securely access and download your online medical record. [employer] allows you to send messages to your doctor, view your test results, renew your prescriptions, schedule appointments, and more. How Do I Sign Up? In your internet browser, go to [email or web address]. Click on the First Time User? Click Here link in the Sign In box. You will see the New Member Sign Up page. Enter your [employer] Access Code exactly as it appears below. You will not need to use this code after youve completed the sign-up process. If you do not sign up before the expiration date, you must request a new code. [employer] Access Code: Activation code not generated  Current [employer] Status: Active (This is the date your [employer] access code will )    Enter the last four digits of your Social Security Number (xxxx) and Date of Birth (mm/dd/yyyy) as indicated and click Submit. You will be taken to the next sign-up page. Create a [employer] ID. This will be your [employer] login ID and cannot be changed, so think of one that is secure and easy to remember. Create a [employer] password. You can change your password at any time. Enter your Password Reset Question and Answer. This can be used at a later time if you forget your password. Enter your e-mail address. You will receive e-mail notification when new information is available in [address] Click Sign Up. You can now view and download portions of your medical record. Click the [place] link to download a portable copy of your medical information. Additional Information    If you have questions, please visit the Frequently Asked Questions section of the [employer] website at [email or web address] Remember, [employer] is NOT to be used for urgent needs.  For medical emergencies, dial 911.  '

## 2023-01-19 NOTE — PROGRESS NOTES
Chief Complaint   Patient presents with    Follow-up     F/U after COVID     1. \"Have you been to the ER, urgent care clinic since your last visit? Hospitalized since your last visit? \" No    2. \"Have you seen or consulted any other health care providers outside of the [address] [place] [address] since your last visit? \" No     3. For patients aged 39-70: Has the patient had a colonoscopy / FIT/ Cologuard? No      If the patient is female:    4. For patients aged 41-77: Has the patient had a mammogram within the past 2 years? N/A      5. For patients aged 21-65: Has the patient had a pap smear? N/A    Pt states he have a lingering cough and congestion. Pt states his anxiety are bothering him as well.

## 2023-02-04 ENCOUNTER — HOSPITAL ENCOUNTER (INPATIENT)
Age: 33
LOS: 3 days | Discharge: HOME | DRG: 917 | End: 2023-02-07
Attending: STUDENT IN AN ORGANIZED HEALTH CARE EDUCATION/TRAINING PROGRAM | Admitting: HOSPITALIST

## 2023-02-04 DIAGNOSIS — T65.92XA SUICIDAL DELIBERATE POISONING, INITIAL ENCOUNTER (HCC): ICD-10-CM

## 2023-02-04 DIAGNOSIS — T43.014A TRICYCLIC ANTIDEPRESSANT OVERDOSE OF UNDETERMINED INTENT, INITIAL ENCOUNTER: Primary | ICD-10-CM

## 2023-02-04 DIAGNOSIS — R40.4 TRANSIENT ALTERATION OF AWARENESS: ICD-10-CM

## 2023-02-04 PROBLEM — T50.902A SUICIDE ATTEMPT BY DRUG INGESTION (HCC): Status: ACTIVE | Noted: 2023-02-04

## 2023-02-04 LAB
ALBUMIN SERPL-MCNC: 4.2 G/DL (ref 3.5–5)
ALBUMIN/GLOB SERPL: 1.2 (ref 1.1–2.2)
ALP SERPL-CCNC: 82 U/L (ref 45–117)
ALT SERPL-CCNC: 18 U/L (ref 12–78)
ANION GAP SERPL CALC-SCNC: 8 MMOL/L (ref 5–15)
APAP SERPL-MCNC: <2 UG/ML (ref 10–30)
AST SERPL-CCNC: 12 U/L (ref 15–37)
BASOPHILS # BLD: 0 K/UL (ref 0–0.1)
BASOPHILS NFR BLD: 0 % (ref 0–1)
BILIRUB SERPL-MCNC: 0.5 MG/DL (ref 0.2–1)
BUN SERPL-MCNC: 8 MG/DL (ref 6–20)
BUN/CREAT SERPL: 9 (ref 12–20)
CALCIUM SERPL-MCNC: 8.7 MG/DL (ref 8.5–10.1)
CHLORIDE SERPL-SCNC: 104 MMOL/L (ref 97–108)
CO2 SERPL-SCNC: 28 MMOL/L (ref 21–32)
CREAT SERPL-MCNC: 0.94 MG/DL (ref 0.7–1.3)
DIFFERENTIAL METHOD BLD: NORMAL
EOSINOPHIL # BLD: 0 K/UL (ref 0–0.4)
EOSINOPHIL NFR BLD: 0 % (ref 0–7)
ERYTHROCYTE [DISTWIDTH] IN BLOOD BY AUTOMATED COUNT: 13.6 % (ref 11.5–14.5)
ETHANOL SERPL-MCNC: <10 MG/DL
GLOBULIN SER CALC-MCNC: 3.5 G/DL (ref 2–4)
GLUCOSE SERPL-MCNC: 90 MG/DL (ref 65–100)
HCT VFR BLD AUTO: 39.7 % (ref 36.6–50.3)
HGB BLD-MCNC: 13.4 G/DL (ref 12.1–17)
IMM GRANULOCYTES # BLD AUTO: 0 K/UL (ref 0–0.04)
IMM GRANULOCYTES NFR BLD AUTO: 0 % (ref 0–0.5)
LYMPHOCYTES # BLD: 1.5 K/UL (ref 0.8–3.5)
LYMPHOCYTES NFR BLD: 28 % (ref 12–49)
MAGNESIUM SERPL-MCNC: 2.4 MG/DL (ref 1.6–2.4)
MCH RBC QN AUTO: 32 PG (ref 26–34)
MCHC RBC AUTO-ENTMCNC: 33.8 G/DL (ref 30–36.5)
MCV RBC AUTO: 94.7 FL (ref 80–99)
MONOCYTES # BLD: 0.5 K/UL (ref 0–1)
MONOCYTES NFR BLD: 8 % (ref 5–13)
NEUTS SEG # BLD: 3.4 K/UL (ref 1.8–8)
NEUTS SEG NFR BLD: 64 % (ref 32–75)
NRBC # BLD: 0 K/UL (ref 0–0.01)
NRBC BLD-RTO: 0 PER 100 WBC
PLATELET # BLD AUTO: 209 K/UL (ref 150–400)
PMV BLD AUTO: 10.8 FL (ref 8.9–12.9)
POTASSIUM SERPL-SCNC: 2.7 MMOL/L (ref 3.5–5.1)
PROT SERPL-MCNC: 7.7 G/DL (ref 6.4–8.2)
RBC # BLD AUTO: 4.19 M/UL (ref 4.1–5.7)
SALICYLATES SERPL-MCNC: <1.7 MG/DL (ref 2.8–20)
SARS-COV-2 RDRP RESP QL NAA+PROBE: NOT DETECTED
SODIUM SERPL-SCNC: 140 MMOL/L (ref 136–145)
SOURCE, COVRS: NORMAL
WBC # BLD AUTO: 5.4 K/UL (ref 4.1–11.1)

## 2023-02-04 PROCEDURE — 93005 ELECTROCARDIOGRAM TRACING: CPT

## 2023-02-04 PROCEDURE — 65610000006 HC RM INTENSIVE CARE

## 2023-02-04 PROCEDURE — 96361 HYDRATE IV INFUSION ADD-ON: CPT

## 2023-02-04 PROCEDURE — 80143 DRUG ASSAY ACETAMINOPHEN: CPT

## 2023-02-04 PROCEDURE — 74011250636 HC RX REV CODE- 250/636: Performed by: NURSE PRACTITIONER

## 2023-02-04 PROCEDURE — 74011250636 HC RX REV CODE- 250/636: Performed by: STUDENT IN AN ORGANIZED HEALTH CARE EDUCATION/TRAINING PROGRAM

## 2023-02-04 PROCEDURE — 74011000250 HC RX REV CODE- 250: Performed by: NURSE PRACTITIONER

## 2023-02-04 PROCEDURE — 96374 THER/PROPH/DIAG INJ IV PUSH: CPT

## 2023-02-04 PROCEDURE — 85025 COMPLETE CBC W/AUTO DIFF WBC: CPT

## 2023-02-04 PROCEDURE — 80179 DRUG ASSAY SALICYLATE: CPT

## 2023-02-04 PROCEDURE — 99285 EMERGENCY DEPT VISIT HI MDM: CPT

## 2023-02-04 PROCEDURE — 83735 ASSAY OF MAGNESIUM: CPT

## 2023-02-04 PROCEDURE — 87635 SARS-COV-2 COVID-19 AMP PRB: CPT

## 2023-02-04 PROCEDURE — 36415 COLL VENOUS BLD VENIPUNCTURE: CPT

## 2023-02-04 PROCEDURE — 82077 ASSAY SPEC XCP UR&BREATH IA: CPT

## 2023-02-04 PROCEDURE — 80053 COMPREHEN METABOLIC PANEL: CPT

## 2023-02-04 RX ORDER — SODIUM CHLORIDE 0.9 % (FLUSH) 0.9 %
5-40 SYRINGE (ML) INJECTION EVERY 8 HOURS
Status: DISCONTINUED | OUTPATIENT
Start: 2023-02-04 | End: 2023-02-08 | Stop reason: HOSPADM

## 2023-02-04 RX ORDER — ENOXAPARIN SODIUM 100 MG/ML
40 INJECTION SUBCUTANEOUS DAILY
Status: DISCONTINUED | OUTPATIENT
Start: 2023-02-05 | End: 2023-02-08 | Stop reason: HOSPADM

## 2023-02-04 RX ORDER — ACETAMINOPHEN 650 MG/1
650 SUPPOSITORY RECTAL
Status: DISCONTINUED | OUTPATIENT
Start: 2023-02-04 | End: 2023-02-08 | Stop reason: HOSPADM

## 2023-02-04 RX ORDER — LORAZEPAM 2 MG/ML
1 INJECTION INTRAMUSCULAR ONCE
Status: COMPLETED | OUTPATIENT
Start: 2023-02-04 | End: 2023-02-04

## 2023-02-04 RX ORDER — POTASSIUM CHLORIDE 7.45 MG/ML
10 INJECTION INTRAVENOUS
Status: COMPLETED | OUTPATIENT
Start: 2023-02-04 | End: 2023-02-05

## 2023-02-04 RX ORDER — POLYETHYLENE GLYCOL 3350 17 G/17G
17 POWDER, FOR SOLUTION ORAL DAILY PRN
Status: DISCONTINUED | OUTPATIENT
Start: 2023-02-04 | End: 2023-02-08 | Stop reason: HOSPADM

## 2023-02-04 RX ORDER — ACETAMINOPHEN 325 MG/1
650 TABLET ORAL
Status: DISCONTINUED | OUTPATIENT
Start: 2023-02-04 | End: 2023-02-08 | Stop reason: HOSPADM

## 2023-02-04 RX ORDER — SODIUM CHLORIDE 0.9 % (FLUSH) 0.9 %
5-40 SYRINGE (ML) INJECTION AS NEEDED
Status: DISCONTINUED | OUTPATIENT
Start: 2023-02-04 | End: 2023-02-08 | Stop reason: HOSPADM

## 2023-02-04 RX ADMIN — POTASSIUM CHLORIDE 10 MEQ: 7.46 INJECTION, SOLUTION INTRAVENOUS at 23:44

## 2023-02-04 RX ADMIN — LORAZEPAM 2 MG: 2 INJECTION INTRAMUSCULAR; INTRAVENOUS at 21:48

## 2023-02-04 RX ADMIN — SODIUM CHLORIDE 1000 ML: 9 INJECTION, SOLUTION INTRAVENOUS at 21:23

## 2023-02-04 RX ADMIN — SODIUM CHLORIDE, PRESERVATIVE FREE 10 ML: 5 INJECTION INTRAVENOUS at 23:45

## 2023-02-04 RX ADMIN — POTASSIUM CHLORIDE 10 MEQ: 7.46 INJECTION, SOLUTION INTRAVENOUS at 22:40

## 2023-02-05 LAB
ALBUMIN SERPL-MCNC: 3.6 G/DL (ref 3.5–5)
ALBUMIN/GLOB SERPL: 1.1 (ref 1.1–2.2)
ALP SERPL-CCNC: 78 U/L (ref 45–117)
ALT SERPL-CCNC: 16 U/L (ref 12–78)
AMPHET UR QL SCN: NEGATIVE
ANION GAP SERPL CALC-SCNC: 3 MMOL/L (ref 5–15)
APPEARANCE UR: CLEAR
ARTERIAL PATENCY WRIST A: ABNORMAL
ARTERIAL PATENCY WRIST A: ABNORMAL
AST SERPL-CCNC: 12 U/L (ref 15–37)
BARBITURATES UR QL SCN: NEGATIVE
BASE DEFICIT BLDA-SCNC: 2.2 MMOL/L
BASE DEFICIT BLDA-SCNC: 2.9 MMOL/L
BASOPHILS # BLD: 0 K/UL (ref 0–0.1)
BASOPHILS NFR BLD: 0 % (ref 0–1)
BDY SITE: ABNORMAL
BDY SITE: ABNORMAL
BENZODIAZ UR QL: NEGATIVE
BILIRUB DIRECT SERPL-MCNC: 0.1 MG/DL (ref 0–0.2)
BILIRUB SERPL-MCNC: 0.5 MG/DL (ref 0.2–1)
BILIRUB UR QL: NEGATIVE
BREATHS.SPONTANEOUS ON VENT: 20
BUN SERPL-MCNC: 6 MG/DL (ref 6–20)
BUN/CREAT SERPL: 8 (ref 12–20)
CALCIUM SERPL-MCNC: 8.2 MG/DL (ref 8.5–10.1)
CANNABINOIDS UR QL SCN: POSITIVE
CHLORIDE SERPL-SCNC: 111 MMOL/L (ref 97–108)
CK SERPL-CCNC: 217 U/L (ref 39–308)
CO2 SERPL-SCNC: 26 MMOL/L (ref 21–32)
COCAINE UR QL SCN: NEGATIVE
COLOR UR: ABNORMAL
CREAT SERPL-MCNC: 0.71 MG/DL (ref 0.7–1.3)
DIFFERENTIAL METHOD BLD: ABNORMAL
DRUG SCRN COMMENT,DRGCM: ABNORMAL
EOSINOPHIL # BLD: 0 K/UL (ref 0–0.4)
EOSINOPHIL NFR BLD: 0 % (ref 0–7)
ERYTHROCYTE [DISTWIDTH] IN BLOOD BY AUTOMATED COUNT: 13.7 % (ref 11.5–14.5)
GLOBULIN SER CALC-MCNC: 3.2 G/DL (ref 2–4)
GLUCOSE SERPL-MCNC: 89 MG/DL (ref 65–100)
GLUCOSE UR STRIP.AUTO-MCNC: NEGATIVE MG/DL
HCO3 BLDA-SCNC: 23 MMOL/L (ref 22–26)
HCO3 BLDA-SCNC: 26 MMOL/L (ref 22–26)
HCT VFR BLD AUTO: 37.6 % (ref 36.6–50.3)
HGB BLD-MCNC: 13.1 G/DL (ref 12.1–17)
HGB UR QL STRIP: NEGATIVE
IMM GRANULOCYTES # BLD AUTO: 0 K/UL (ref 0–0.04)
IMM GRANULOCYTES NFR BLD AUTO: 0 % (ref 0–0.5)
KETONES UR QL STRIP.AUTO: 15 MG/DL
LACTATE SERPL-SCNC: 0.6 MMOL/L (ref 0.4–2)
LEUKOCYTE ESTERASE UR QL STRIP.AUTO: NEGATIVE
LYMPHOCYTES # BLD: 1.4 K/UL (ref 0.8–3.5)
LYMPHOCYTES NFR BLD: 30 % (ref 12–49)
MAGNESIUM SERPL-MCNC: 2.3 MG/DL (ref 1.6–2.4)
MCH RBC QN AUTO: 33.2 PG (ref 26–34)
MCHC RBC AUTO-ENTMCNC: 34.8 G/DL (ref 30–36.5)
MCV RBC AUTO: 95.4 FL (ref 80–99)
METHADONE UR QL: NEGATIVE
MONOCYTES # BLD: 0.3 K/UL (ref 0–1)
MONOCYTES NFR BLD: 7 % (ref 5–13)
NEUTS SEG # BLD: 2.9 K/UL (ref 1.8–8)
NEUTS SEG NFR BLD: 63 % (ref 32–75)
NITRITE UR QL STRIP.AUTO: NEGATIVE
NRBC # BLD: 0 K/UL (ref 0–0.01)
NRBC BLD-RTO: 0 PER 100 WBC
OPIATES UR QL: NEGATIVE
PCO2 BLDA: 46 MMHG (ref 35–45)
PCO2 BLDA: 60 MMHG (ref 35–45)
PCP UR QL: NEGATIVE
PH BLDA: 7.26 (ref 7.35–7.45)
PH BLDA: 7.33 (ref 7.35–7.45)
PH UR STRIP: 6.5 (ref 5–8)
PHOSPHATE SERPL-MCNC: 2.8 MG/DL (ref 2.6–4.7)
PLATELET # BLD AUTO: 184 K/UL (ref 150–400)
PMV BLD AUTO: 10.3 FL (ref 8.9–12.9)
PO2 BLDA: 102 MMHG (ref 80–100)
PO2 BLDA: 45 MMHG (ref 80–100)
POTASSIUM SERPL-SCNC: 4.1 MMOL/L (ref 3.5–5.1)
PROT SERPL-MCNC: 6.8 G/DL (ref 6.4–8.2)
PROT UR STRIP-MCNC: NEGATIVE MG/DL
RBC # BLD AUTO: 3.94 M/UL (ref 4.1–5.7)
SAO2 % BLD: 74 % (ref 92–97)
SAO2 % BLD: 97 % (ref 92–97)
SAO2% DEVICE SAO2% SENSOR NAME: ABNORMAL
SAO2% DEVICE SAO2% SENSOR NAME: ABNORMAL
SERVICE CMNT-IMP: ABNORMAL
SODIUM SERPL-SCNC: 140 MMOL/L (ref 136–145)
SP GR UR REFRACTOMETRY: 1.01
SPECIMEN SITE: ABNORMAL
SPECIMEN SITE: ABNORMAL
UROBILINOGEN UR QL STRIP.AUTO: 0.2 EU/DL (ref 0.2–1)
WBC # BLD AUTO: 4.7 K/UL (ref 4.1–11.1)

## 2023-02-05 PROCEDURE — 36600 WITHDRAWAL OF ARTERIAL BLOOD: CPT

## 2023-02-05 PROCEDURE — 93005 ELECTROCARDIOGRAM TRACING: CPT

## 2023-02-05 PROCEDURE — 80076 HEPATIC FUNCTION PANEL: CPT

## 2023-02-05 PROCEDURE — 74011250636 HC RX REV CODE- 250/636: Performed by: HOSPITALIST

## 2023-02-05 PROCEDURE — 82550 ASSAY OF CK (CPK): CPT

## 2023-02-05 PROCEDURE — 83735 ASSAY OF MAGNESIUM: CPT

## 2023-02-05 PROCEDURE — 65270000046 HC RM TELEMETRY

## 2023-02-05 PROCEDURE — 85025 COMPLETE CBC W/AUTO DIFF WBC: CPT

## 2023-02-05 PROCEDURE — 84100 ASSAY OF PHOSPHORUS: CPT

## 2023-02-05 PROCEDURE — 74011250636 HC RX REV CODE- 250/636: Performed by: NURSE PRACTITIONER

## 2023-02-05 PROCEDURE — 36415 COLL VENOUS BLD VENIPUNCTURE: CPT

## 2023-02-05 PROCEDURE — 80048 BASIC METABOLIC PNL TOTAL CA: CPT

## 2023-02-05 PROCEDURE — 82803 BLOOD GASES ANY COMBINATION: CPT

## 2023-02-05 PROCEDURE — 74011250637 HC RX REV CODE- 250/637: Performed by: PHYSICIAN ASSISTANT

## 2023-02-05 PROCEDURE — 74011000250 HC RX REV CODE- 250: Performed by: NURSE PRACTITIONER

## 2023-02-05 PROCEDURE — 81003 URINALYSIS AUTO W/O SCOPE: CPT

## 2023-02-05 PROCEDURE — 80307 DRUG TEST PRSMV CHEM ANLYZR: CPT

## 2023-02-05 PROCEDURE — 83605 ASSAY OF LACTIC ACID: CPT

## 2023-02-05 RX ORDER — DIPHENHYDRAMINE HCL 25 MG
25 CAPSULE ORAL ONCE
Status: COMPLETED | OUTPATIENT
Start: 2023-02-05 | End: 2023-02-05

## 2023-02-05 RX ORDER — SODIUM CHLORIDE, SODIUM LACTATE, POTASSIUM CHLORIDE, CALCIUM CHLORIDE 600; 310; 30; 20 MG/100ML; MG/100ML; MG/100ML; MG/100ML
75 INJECTION, SOLUTION INTRAVENOUS CONTINUOUS
Status: DISCONTINUED | OUTPATIENT
Start: 2023-02-05 | End: 2023-02-08 | Stop reason: HOSPADM

## 2023-02-05 RX ADMIN — SODIUM CHLORIDE, PRESERVATIVE FREE 10 ML: 5 INJECTION INTRAVENOUS at 07:09

## 2023-02-05 RX ADMIN — DIPHENHYDRAMINE HYDROCHLORIDE 25 MG: 25 CAPSULE ORAL at 22:47

## 2023-02-05 RX ADMIN — SODIUM CHLORIDE, POTASSIUM CHLORIDE, SODIUM LACTATE AND CALCIUM CHLORIDE 75 ML/HR: 600; 310; 30; 20 INJECTION, SOLUTION INTRAVENOUS at 12:00

## 2023-02-05 RX ADMIN — POTASSIUM CHLORIDE 10 MEQ: 7.46 INJECTION, SOLUTION INTRAVENOUS at 04:04

## 2023-02-05 RX ADMIN — POTASSIUM CHLORIDE 10 MEQ: 7.46 INJECTION, SOLUTION INTRAVENOUS at 00:59

## 2023-02-05 RX ADMIN — SODIUM CHLORIDE, PRESERVATIVE FREE 10 ML: 5 INJECTION INTRAVENOUS at 16:06

## 2023-02-05 RX ADMIN — SODIUM CHLORIDE, PRESERVATIVE FREE 10 ML: 5 INJECTION INTRAVENOUS at 21:38

## 2023-02-05 RX ADMIN — ENOXAPARIN SODIUM 40 MG: 100 INJECTION SUBCUTANEOUS at 09:26

## 2023-02-05 RX ADMIN — POTASSIUM CHLORIDE 10 MEQ: 7.46 INJECTION, SOLUTION INTRAVENOUS at 03:00

## 2023-02-05 RX ADMIN — POTASSIUM CHLORIDE 10 MEQ: 7.46 INJECTION, SOLUTION INTRAVENOUS at 01:57

## 2023-02-05 NOTE — ED PROVIDER NOTES
[facility] EMERGENCY DEPT  EMERGENCY DEPARTMENT ENCOUNTER       Pt Name: Roberto Alvarado  MRN: [id number]  [place] 1990  Date of evaluation: 2/4/2023  Provider: Nimco Ravi MD   PCP: Josiane Reynolds DO  Note Started: 9:44 PM 2/4/23     CHIEF COMPLAINT       Chief Complaint   Patient presents with    Drug Overdose     Pt arrives via EMS from home. Pt took 20 (25mg) of Doxepin approx 1 hr ago. Total of 500mg. Pt was having SI thoughts. There was a 3rd party call to EMS. In route, pt was alert and ambulatory. Pt was sitting up and stating he was feeling \"funny\" and went of of consciousness in route. Pt is a/o x4 on arrival.         HISTORY OF PRESENT ILLNESS: 1 or more elements    History From: Patient and EMS  HPI Limitations : Other (Lethargy)     Yoselin Srinivasan III is a 34 y.o. male with Pmhx listed below     Is a 76-year-old male with history of depression, HIV, well-controlled presenting with concern of overdose. Patient present with EMS approximately 1 hour after ingesting 2025 mg tablets of his TCA antidepressant Doxepin. Patient states that he was attempting suicide, having SI thoughts, states that he has had suicidal ideation in the past, initially was very alert and oriented with EMS then became very lethargic, on arrival to ED he is easily arousable but sleepy, answers all questions appropriately, currently denying any chest pain, shortness of breath, abdominal pain, nausea or vomiting. Patient states that he does not currently want to end his life, he amendable to behavioral health admission. Nursing Notes were all reviewed and agreed with or any disagreements were addressed in the HPI. REVIEW OF SYSTEMS      Positives and Pertinent negatives as per HPI.     PAST HISTORY     Past Medical History:  Past Medical History:   Diagnosis Date    Anxiety     Asthma     Depression     Depression 8/22/2016    HIV (human immunodeficiency virus infection) ([facility] 12/3/2013     Previous Medications    DOXEPIN (SINEQUAN) 25 MG CAPSULE    Take 1 Capsule by mouth nightly. ELVITEGRAVIR-COBICISTAT-EMTRICITABINE-TENOFOVIR ALAFENAMIDE (GENVOYA) TAB TABLET    TAKE 1 TABLET BY MOUTH EVERY DAY WITH BREAKFAST       Past Surgical History:  Past Surgical History:   Procedure Laterality Date    HX WISDOM TEETH EXTRACTION         Family History:  Family History   Problem Relation Age of Onset    Hypertension Mother     Diabetes Mother     Depression Mother     Anxiety Mother     Depression Father     Hypertension Father        Social History:  Social History     Tobacco Use    Smoking status: Some Days     Packs/day: 0.50     Types: Cigarettes    Smokeless tobacco: Current   Vaping Use    Vaping Use: Never used   Substance Use Topics    Alcohol use: Yes     Alcohol/week: 14.0 standard drinks     Types: 2 Glasses of wine, 12 Cans of beer per week    Drug use: Yes     Types: Marijuana       Allergies:  No Known Allergies    PHYSICAL EXAM      ED Triage Vitals   ED Encounter Vitals Group      BP 02/04/23 2102 121/86      Pulse (Heart Rate) 02/04/23 2102 70      Resp Rate 02/04/23 2102 20      Temp 02/04/23 2140 98.3 °F (36.8 °C)      Temp src --       O2 Sat (%) 02/04/23 2102 100 %      Weight 02/04/23 2104 140 lb      Height 02/04/23 2104 5' 6\"        Physical Exam  Vitals reviewed. Constitutional:       General: He is not in acute distress. Appearance: Normal appearance. He is not ill-appearing, toxic-appearing or diaphoretic. HENT:      Head: Normocephalic. Mouth/Throat:      Mouth: Mucous membranes are moist.   Eyes:      Conjunctiva/sclera: Conjunctivae normal.      Pupils: Pupils are equal, round, and reactive to light. Cardiovascular:      Rate and Rhythm: Normal rate. Pulmonary:      Effort: Pulmonary effort is normal. No respiratory distress. Abdominal:      General: Abdomen is flat. Tenderness: There is no abdominal tenderness. There is no guarding or rebound.    Musculoskeletal: General: No deformity. Cervical back: Neck supple. Skin:     General: Skin is warm and dry. Neurological:      General: No focal deficit present. Mental Status: He is alert. Psychiatric:         Mood and Affect: Mood normal.        EMERGENCY DEPARTMENT COURSE and DIFFERENTIAL DIAGNOSIS/MDM   CC/HPI Summary, DDx, ED Course, and Reassessment:     MDM  Number of Diagnoses or Management Options  Diagnosis management comments: Patient is a 78-year-old male with history of depression, coming in after TCA overdose, ingested approximately 500 mg of doxepin, his home TCA about 1 hour prior to arrival, patient initially alert and oriented and easily responsive with EMS and became very lethargic, on arrival to ED patient is easily arousable, responds to questions appropriately, answers that he is not currently feeling suicidal but did have these feelings earlier which prompted his overdose. Does have history of this in the past, denying any pain shortness of breath or other complaints today. Endorses drinking tonight but only 1 glass of wine. EKG obtained on arrival shows normal QRS but we will continue to monitor with telemetry closely, patient initially nontachycardic and in no apparent distress but acutely became very agitated and tachycardic, pulling monitor off and appeared disoriented, provided patient with 2 mg Ativan due to concern of anticholinergic effects of his medication, QRS maintained less than 100 during this. Patient responded well to Ativan, now sleepy, easily arousable with discriminate mumbling, protecting airway, placed on end-tidal and monitoring QRS. Discussed this case with ICU who will come evaluate the patient for admission for concern of close monitoring needing with TCA overdose, poison control contacted as well. Consideration of QRS prolongation, seizures, hypotension, disorientation, anticholinergic toxidrome.         ED Course as of 02/04/23 2228   Sat Feb 04, 2023 2154 Episode of agitation, heart rate up to 150s, placed back in bed, given 2 mg Ativan, concern of anticholinergic properties, will place back on monitor and have ICU evaluate due to concern of TCA overdose with considerable amount ingested need for close monitoring. [RN]      ED Course User Index  [RN] Christiano Watson MD       Vitals:    02/04/23 2129 02/04/23 2140 02/04/23 2144 02/04/23 2159   BP:   116/76    Pulse: (!) 103  (!) 151 81   Resp:       Temp:  98.3 °F (36.8 °C)     SpO2: 100%  98% 100%   Weight:       Height:            Patient was given the following medications:  Medications   sodium chloride 0.9 % bolus infusion 1,000 mL (1,000 mL IntraVENous New Bag 2/4/23 2123)   LORazepam (ATIVAN) injection 1 mg (2 mg IntraVENous Given 2/4/23 2148)       CONSULTS:  None    Social Determinants affecting Dx or Tx: None    Records Reviewed (source and summary of external notes): None  DIAGNOSTIC RESULTS   LABS:     Recent Results (from the past 12 hour(s))   EKG, 12 LEAD, INITIAL    Collection Time: 02/04/23  8:59 PM   Result Value Ref Range    Ventricular Rate 76 BPM    Atrial Rate 76 BPM    P-R Interval 146 ms    QRS Duration 92 ms    Q-T Interval 370 ms    QTC Calculation (Bezet) 416 ms    Calculated P Axis 71 degrees    Calculated R Axis 62 degrees    Calculated T Axis 67 degrees    Diagnosis       Normal sinus rhythm  Minimal voltage criteria for LVH, may be normal variant  When compared with ECG of 15-APR-2019 08:56,  No significant change was found     CBC WITH AUTOMATED DIFF    Collection Time: 02/04/23  9:19 PM   Result Value Ref Range    WBC 5.4 4.1 - 11.1 K/uL    RBC 4.19 4. 10 - 5.70 M/uL    HGB 13.4 12.1 - 17.0 g/dL    HCT 39.7 36.6 - 50.3 %    MCV 94.7 80.0 - 99.0 FL    MCH 32.0 26.0 - 34.0 PG    MCHC 33.8 30.0 - 36.5 g/dL    RDW 13.6 11.5 - 14.5 %    PLATELET 536 564 - 922 K/uL    MPV 10.8 8.9 - 12.9 FL    NRBC 0.0 0  WBC    ABSOLUTE NRBC 0.00 0.00 - 0.01 K/uL    NEUTROPHILS 64 32 - 75 % LYMPHOCYTES 28 12 - 49 %    MONOCYTES 8 5 - 13 %    EOSINOPHILS 0 0 - 7 %    BASOPHILS 0 0 - 1 %    IMMATURE GRANULOCYTES 0 0.0 - 0.5 %    ABS. NEUTROPHILS 3.4 1.8 - 8.0 K/UL    ABS. LYMPHOCYTES 1.5 0.8 - 3.5 K/UL    ABS. MONOCYTES 0.5 0.0 - 1.0 K/UL    ABS. EOSINOPHILS 0.0 0.0 - 0.4 K/UL    ABS. BASOPHILS 0.0 0.0 - 0.1 K/UL    ABS. IMM. GRANS. 0.0 0.00 - 0.04 K/UL    DF AUTOMATED     SALICYLATE    Collection Time: 02/04/23  9:19 PM   Result Value Ref Range    Salicylate level <0.3 (L) 2.8 - 20.0 MG/DL   ACETAMINOPHEN    Collection Time: 02/04/23  9:19 PM   Result Value Ref Range    Acetaminophen level <2 (L) 10 - 30 ug/mL   ETHYL ALCOHOL    Collection Time: 02/04/23  9:19 PM   Result Value Ref Range    ALCOHOL(ETHYL),SERUM <10 <10 MG/DL        EKG: QRS 92, regular rate and rhythm, no ST changes     RADIOLOGY:  Non-plain film images such as CT, Ultrasound and MRI are read by the radiologist.   Plain radiographic images are often visualized and preliminarily interpreted by the ED, if an interpretation was completed the findings will be listed below:        Interpretation per the Radiologist below, if available at the time of this note:     No results found. PROCEDURES   Unless otherwise noted below, none  Procedures     CRITICAL CARE TIME   I have spent crit minutes of critical care time in evaluating and treating this patient. This includes time spent at bedside, time with family and decision makers, documentation, review of labs and imaging, and/or consultation with specialists. It does not include time spent on separately billed procedures. This patient presents with a critical illness or injury that acutely impairs one or more vital organ systems such that there is a high probability of imminent or life threatening deterioration in the patient's condition.   This case involved decision making of high complexity to assess, manipulate, and support vital organ system failure and/or to prevent further life threatening deterioration of the patient's condition. Failure to initiate these interventions on an urgent basis would likely result in sudden, clinically significant or life threatening deterioration in the patient's condition. Abnormal findings supporting critical care: TCA overdose, aggitation  Interventions to support critical care: Performing bedside care, reviewing ancillary studies, discussion with family/consultants, completing documentation  Failure to intervene may result in: Decompensation/death        FINAL IMPRESSION   No diagnosis found. DISPOSITION/PLAN       Admit Note: Pt is being admitted by [facility]. The results of their tests and reason(s) for their admission have been discussed with pt and/or available family. They convey agreement and understanding for the need to be admitted and for the admission diagnosis. PATIENT REFERRED TO:  Follow-up Information    None           DISCHARGE MEDICATIONS:  Current Discharge Medication List            DISCONTINUED MEDICATIONS:  Current Discharge Medication List          I am the Primary Clinician of Record. Signed By: Vinnie Will MD     February 4, 2023      (Please note that parts of this dictation were completed with voice recognition software. Quite often unanticipated grammatical, syntax, homophones, and other interpretive errors are inadvertently transcribed by the computer software. Please disregards these errors.  Please excuse any errors that have escaped final proofreading.)

## 2023-02-05 NOTE — PROGRESS NOTES
Critical Care Progress Note  Maren Calle MD          Date of Service:  2023  NAME:  Galen Tripathi III  :  1990  MRN:  [id number]      Subjective/Hospital course:      : Still sleepy but able to answer questions. No acute events overnight. Hemodynamically stable. Active Problems Being Managed:      Suicide attempt  Drug overdose  Acute metabolic encephalopathy        Assessment/Plan:      Suicide attempt by Drug overdose  -as per EMS pt ingested 20 tabs of 25mg Doxepin (a tricyclic antidepressant)  -poison control contacted by ED  -Q 4 hr EKG, if QRS complex wider then 0.12 sec will push bicarb  -seizure precautions  -acetaminophen, salicylate, and alcohol level all negative  -consult to psychiatry and inpatient psychiatric admission for suicide attempt once medically cleared     Acute metabolic encephalopathy due to TCA overdose + ativan  -mental status progressively worsened with time from overdose and ativan administration  -moving all extremities  -keep end title NC on to monitor pCO2     HIV  -cont home HIV meds. Hypokalemia  -replaced. DVT prophylaxis: Lovenox  SUP: n/a  Code status: Full     Next of kin: unable to name GER     I personally spent ---- minutes of critical care time. This is time spent at this critically ill patient's bedside actively involved in patient care as well as the coordination of care and discussions with the patient's family. This does not include any procedural time which has been billed separately. Review of Systems:   Review of Systems   All other systems reviewed and are negative.        Vital Signs:   Patient Vitals for the past 4 hrs:   BP Temp Pulse Resp SpO2   23 1100 102/82 -- 75 18 100 %   23 1000 108/80 -- 76 20 100 %   23 0900 100/77 -- 90 16 99 %   23 0830 90/61 -- 84 16 98 %   23 0800 90/61 98.4 °F (36.9 °C) 80 17 99 %        Intake/Output Summary (Last 24 hours) at 2023 1148  Last data filed at 2/5/2023 9020  Gross per 24 hour   Intake 1000 ml   Output 750 ml   Net 250 ml        Physical Examination:    Physical Exam  Constitutional:       Appearance: Normal appearance. Comments: Drowsy. HENT:      Head: Normocephalic and atraumatic. Mouth/Throat:      Mouth: Mucous membranes are moist.   Eyes:      Conjunctiva/sclera: Conjunctivae normal.   Cardiovascular:      Rate and Rhythm: Normal rate and regular rhythm. Pulmonary:      Effort: Pulmonary effort is normal. No respiratory distress. Abdominal:      General: There is no distension. Palpations: Abdomen is soft. Tenderness: There is no abdominal tenderness. There is no guarding. Musculoskeletal:      Cervical back: Neck supple. Labs and Imaging:   Reviewed.       Medications:     Current Facility-Administered Medications   Medication Dose Route Frequency    sodium chloride (NS) flush 5-40 mL  5-40 mL IntraVENous Q8H    sodium chloride (NS) flush 5-40 mL  5-40 mL IntraVENous PRN    acetaminophen (TYLENOL) tablet 650 mg  650 mg Oral Q6H PRN    Or    acetaminophen (TYLENOL) suppository 650 mg  650 mg Rectal Q6H PRN    polyethylene glycol (MIRALAX) packet 17 g  17 g Oral DAILY PRN    enoxaparin (LOVENOX) injection 40 mg  40 mg SubCUTAneous DAILY    [Held by provider] elvitegravir-cobicistat-emtricitabine-tenofovir alafenamide (GENVOYA) [phone] mg tablet 1 Tablet  1 Tablet Oral DAILY WITH BREAKFAST     ______________________________________________________________________  EXPECTED LENGTH OF STAY: - - -  ACTUAL LENGTH OF STAY:          [address] Jessica Villalta MD   Pulmonary/CCM  [address] [phone]  2/5/2023

## 2023-02-05 NOTE — H&P
[facility] Daily Progress Note. Name: Mari Viera III   : 1990   MRN: [id number]   Date: 2023        Chief Complaint   Patient presents with    Drug Overdose     Pt arrives via EMS from home. Pt took 20 (25mg) of Doxepin approx 1 hr ago. Total of 500mg. Pt was having SI thoughts. There was a 3rd party call to EMS. In route, pt was alert and ambulatory. Pt was sitting up and stating he was feeling \"funny\" and went of of consciousness in route. Pt is a/o x4 on arrival.          HPI:     33 y/o M pt with pmh of depression and HIV presented to ED [facility] for suicide attempt by drug ingestion. As per EMS pt called them about 1 hour after ingesting 20 tabs of 25mg Doxepin. Upon arrival to ED [facility] pt lethargic but still answering questions appropriately. Poison control contacted and advised \"pt could be at risk for seizures, dysrhytmias, and a widening QRS, typically seen within 6 hours. Recommended sodium bicarb push for QRS >0.12 sec\". Shortly after arrival pt became restless and agitated. 2 mg ativan given with improvement in agitation. ICU consulted for admission. Upon my arrival, pt responsive to noxious stimuli only but still protecting airway. SPO2 100% on RA. Wearing end title NC with pCO2 reading in the 50s. BP and HR WNL. Will be admitted to the ICU for further management.       Active Problems Being Managed:     Suicide attempt  Drug overdose  Acute metabolic encephalopathy      Assessment/Plan:     Suicide attempt by Drug overdose  -as per EMS pt ingested 20 tabs of 25mg Doxepin (a tricyclic antidepressant)  -poison control contacted by ED  -s/p 1 L IVF  -will get Q 4 hr EKG, if QRS complex wider then 0.12 sec will push bicarb  -seizure precautions  -acetaminophen, salicylate, and alcohol level all negative  -consult to psychiatry and inpatient psychiatric admission for suicide attempt once medically cleared    Acute metabolic encephalopathy due to TCA overdose + ativan  -mental status progressively worsened with time from overdose and ativan administration  -moving all extremities  -get ABG to rule out hypercarbia  -keep end title NC on to monitor pCO2    HIV  -cont home HIV meds once alert enough to swallow    Hypokalemia  -K 2.7, Mg WNL  -only has peripheral Ivs, will start with 60mEq of K over 6 hours and repeat potassium, will likely require more repletion    DVT prophylaxis: Lovenox  SUP: n/a  Code status: Full    Next of kin: unable to name GER    I personally spent 60 minutes of critical care time. This is time spent at this critically ill patient's bedside actively involved in patient care as well as the coordination of care and discussions with the patient's family. This does not include any procedural time which has been billed separately. Review of systems:     Review of Systems   Unable to perform ROS: Acuity of condition        Objective:     Vital Signs:  Visit Vitals  /78 (BP 1 Location: Left upper arm, BP Patient Position: At rest)   Pulse 97   Temp (!) 96.7 °F (35.9 °C)   Resp 15   Ht 5' 6\" (1.676 m)   Wt 63.5 kg (140 lb)   SpO2 98%   BMI 22.60 kg/m²    O2 Flow Rate (L/min): 2 l/min O2 Device: None (Room air) Temp (24hrs), Av.3 °F (35.7 °C), Min:94.4 °F (34.7 °C), Max:98.3 °F (36.8 °C)           Intake/Output:     Intake/Output Summary (Last 24 hours) at 2023 0212  Last data filed at 2023 2245  Gross per 24 hour   Intake 1000 ml   Output --   Net 1000 ml       Physical Exam:  Physical Exam  Constitutional:       General: He is not in acute distress. HENT:      Nose: Nose normal.      Mouth/Throat:      Mouth: Mucous membranes are moist.   Eyes:      Pupils: Pupils are equal, round, and reactive to light. Cardiovascular:      Rate and Rhythm: Normal rate and regular rhythm. Pulses: Normal pulses. Heart sounds: Normal heart sounds.    Pulmonary:      Effort: Pulmonary effort is normal.      Breath sounds: Normal breath sounds. Abdominal:      General: There is no distension. Palpations: Abdomen is soft. Tenderness: There is no abdominal tenderness. Musculoskeletal:         General: Normal range of motion. Skin:     General: Skin is warm. Capillary Refill: Capillary refill takes less than 2 seconds. Neurological:      Comments: Responsive to noxious stimuli only, still protecting airway       Past Medical History:        has a past medical history of Anxiety, Asthma, Depression, Depression (8/22/2016), and HIV (human immunodeficiency virus infection) ([facility] (12/3/2013). He has no past medical history of Arthritis, Autoimmune disease ([facility] CAD (coronary artery disease), Cancer ([facility] Chronic kidney disease, COPD, Dementia, Diabetes ([facility] Endocrine disease, Gastrointestinal disorder, Heart failure ([facility] Hypertension, Liver disease, Neurological disorder, Other ill-defined conditions(799.89), PUD (peptic ulcer disease), Seizures ([facility] Sleep disorder, Stroke ([facility] or Thromboembolus ([facility] Past Surgical History:      has a past surgical history that includes hx wisdom teeth extraction. Home Medications:     Prior to Admission medications    Medication Sig Start Date End Date Taking? Authorizing Provider   elvitegravir-cobicistat-emtricitabine-tenofovir alafenamide (Genvoya) tab tablet TAKE 1 TABLET BY MOUTH EVERY DAY WITH BREAKFAST 1/10/23   Dennis Godfrey MD   doxepin (SINEquan) 25 mg capsule Take 1 Capsule by mouth nightly. 6/3/22   Lc Jones NP         Allergies/Social/Family History:     No Known Allergies   Social History     Tobacco Use    Smoking status: Some Days     Packs/day: 0.50     Types: Cigarettes    Smokeless tobacco: Current   Substance Use Topics    Alcohol use:  Yes     Alcohol/week: 14.0 standard drinks     Types: 2 Glasses of wine, 12 Cans of beer per week      Family History   Problem Relation Age of Onset    Hypertension Mother     Diabetes Mother     Depression Mother     Anxiety Mother     Depression Father     Hypertension Father          LABS AND  DATA:   Reviewed      Peak airway pressure:      Minute ventilation:        CRITICAL CARE CONSULTANT NOTE  I had a face to face encounter with the patient, reviewed and interpreted patient data including clinical events, labs, images, vital signs, I/O's, and examined patient. I have discussed the case and the plan and management of the patient's care with the consulting services, the bedside nurses and the respiratory therapist.      NOTE OF PERSONAL INVOLVEMENT IN CARE   This patient has a high probability of imminent, clinically significant deterioration, which requires the highest level of preparedness to intervene urgently. I participated in the decision-making and personally managed or directed the management of the following life and organ supporting interventions that required my frequent assessment to treat or prevent imminent deterioration.         KEIKO Cote [facility]  [phone]  2/5/2023

## 2023-02-05 NOTE — CONSULTS
PSYCHIATRY CONSULT NOTE:    REASON FOR CONSULT: Suicide attempt      HISTORY OF PRESENTING COMPLAINT:  Lakeshia Garcia III is a 34 y.o. BLACK/ male who is currently admitted to the ICU following suicide attempt via overdose of Doxepin. Per RN Manuela Strauss patient reported taking 20 Doxepin to end his life and has been lethargic most of this day. Patient is alert and oriented x 4. Patient is engaged in interview. Reports intentional overdose. Patient refused to discuss events leading up to his suicide attempt. When ask about anxiety and stressors, he refused to discuss. States history of depression and anxiety since childhood. Lately has not felt like getting out of bed and just wants to cry. Currently not on anxiety medications for his mental health. States no appetite. Reports his smokes cigarettes and cannabis daily, and occasion use of ETOH. Reports 2 previous mental health hospitalizations, no other suicide attempts. Denies A/VH. States no longer experiencing SI or plan. Denies HI or plan. PAST PSYCHIATRIC HISTORY and SUBSTANCE ABUSE HISTORY:  SARAN  MDD  Tobacco use d/o  Cannabis use d/o      PAST MEDICAL HISTORY:    Please see H&P for details. Past Medical History:   Diagnosis Date    Anxiety     Asthma     Depression     Depression 8/22/2016    HIV (human immunodeficiency virus infection) ([facility] 12/3/2013     Prior to Admission medications    Medication Sig Start Date End Date Taking? Authorizing Provider   elvitegravir-cobicistat-emtricitabine-tenofovir alafenamide (Genvoya) tab tablet TAKE 1 TABLET BY MOUTH EVERY DAY WITH BREAKFAST 1/10/23   Mere Godfrey MD   doxepin (SINEquan) 25 mg capsule Take 1 Capsule by mouth nightly.  6/3/22   Frances Huang NP     Vitals:    02/05/23 1000 02/05/23 1100 02/05/23 1200 02/05/23 1300   BP: 108/80 102/82 103/77 111/78   Pulse: 76 75 78 84   Resp: 20 18 16 17   Temp:   98 °F (36.7 °C)    SpO2: 100% 100% 100% 100%   Weight:       Height:         Lab Results   Component Value Date/Time    WBC 4.7 02/05/2023 02:21 AM    HGB 13.1 02/05/2023 02:21 AM    HCT 37.6 02/05/2023 02:21 AM    PLATELET 109 35/41/5150 02:21 AM    MCV 95.4 02/05/2023 02:21 AM     Lab Results   Component Value Date/Time    Sodium 140 02/05/2023 02:21 AM    Potassium 4.1 02/05/2023 02:21 AM    Chloride 111 (H) 02/05/2023 02:21 AM    CO2 26 02/05/2023 02:21 AM    Anion gap 3 (L) 02/05/2023 02:21 AM    Glucose 89 02/05/2023 02:21 AM    BUN 6 02/05/2023 02:21 AM    Creatinine 0.71 02/05/2023 02:21 AM    BUN/Creatinine ratio 8 (L) 02/05/2023 02:21 AM    GFR est AA >60 03/05/2021 10:31 AM    GFR est non-AA >60 03/05/2021 10:31 AM    Calcium 8.2 (L) 02/05/2023 02:21 AM    Bilirubin, total 0.5 02/05/2023 02:21 AM    Alk. phosphatase 78 02/05/2023 02:21 AM    Protein, total 6.8 02/05/2023 02:21 AM    Albumin 3.6 02/05/2023 02:21 AM    Globulin 3.2 02/05/2023 02:21 AM    A-G Ratio 1.1 02/05/2023 02:21 AM    ALT (SGPT) 16 02/05/2023 02:21 AM    AST (SGOT) 12 (L) 02/05/2023 02:21 AM     No results found for: VALF2, VALAC, VALP, VALPR, DS6, CRBAM, CRBAMP, CARB2, XCRBAM  No results found for: LITHM  RADIOLOGY REPORTS:(reviewed/updated 2/5/2023)  No results found. No results found for: Flaco Keane [id number], [id number], [id number], PREGU, POCHCG, MHCGN, HCGQR, THCGA1, SHCG, HCGN, [address], HCGURQLPOC    PSYCHOSOCIAL HISTORY:  Lives alone  Single  House keeper  Graduated high school      MENTAL STATUS EXAM:    General appearance:   appropriate for setting  psychomotor activity is WNL   Eye contact: Good eye contact  Speech: Spontaneous, soft, decreased output. Affect : Depressed, decreased range  Mood: \"Feeling down and just want to cry \"  Thought Process: Logical, goal directed  Perception: Denies AH or VH. Thought Content: Denies SI or Plan  Insight: Poor  Judgement: Poor  Cognition: Intact grossly.      ASSESSMENT AND PLAN:  Jessica Kirby III meets criteria for a diagnosis of recurrent major depressive disorder, severe and general anxiety disorder  . Recommend inpatient behavioral treatment once patient is medically cleared. Thank your your consult. Please feel free to consult us again as needed.

## 2023-02-05 NOTE — PROGRESS NOTES
Problem: Pressure Injury - Risk of  Goal: *Prevention of pressure injury  Description: Document Merrill Scale and appropriate interventions in the flowsheet. 2/5/2023 1511 by Conrado Calderon  Outcome: Progressing Towards Goal  Note: Pressure Injury Interventions:  Sensory Interventions: Assess changes in LOC, Float heels, Keep linens dry and wrinkle-free, Maintain/enhance activity level, Minimize linen layers, Turn and reposition approx. every two hours (pillows and wedges if needed)         Activity Interventions: Increase time out of bed, Pressure redistribution bed/mattress(bed type), PT/OT evaluation, Assess need for specialty bed    Mobility Interventions: Assess need for specialty bed, HOB 30 degrees or less, Turn and reposition approx. every two hours(pillow and wedges)    Nutrition Interventions: Document food/fluid/supplement intake    Friction and Shear Interventions: HOB 30 degrees or less, Minimize layers, Lift sheet, Apply protective barrier, creams and emollients, Feet elevated on foot rest             2/5/2023 1510 by Conrado Calderon  Outcome: Progressing Towards Goal  Note: Pressure Injury Interventions:  Sensory Interventions: Assess changes in LOC, Float heels, Keep linens dry and wrinkle-free, Maintain/enhance activity level, Minimize linen layers, Turn and reposition approx. every two hours (pillows and wedges if needed)         Activity Interventions: Increase time out of bed, Pressure redistribution bed/mattress(bed type), PT/OT evaluation, Assess need for specialty bed    Mobility Interventions: Assess need for specialty bed, HOB 30 degrees or less, Turn and reposition approx.  every two hours(pillow and wedges)    Nutrition Interventions: Document food/fluid/supplement intake    Friction and Shear Interventions: HOB 30 degrees or less, Minimize layers, Lift sheet, Apply protective barrier, creams and emollients, Feet elevated on foot rest                Problem: Falls - Risk of  Goal: *Absence of Falls  Description: Document Kajal Hanats Fall Risk and appropriate interventions in the flowsheet.   2/5/2023 1511 by Manjula Regan  Outcome: Progressing Towards Goal  Note: Fall Risk Interventions:                             2/5/2023 1510 by Manjula Regan  Outcome: Progressing Towards Goal  Note: Fall Risk Interventions:                                Problem: Suicide  Goal: *STG: Remains safe in hospital  Outcome: Progressing Towards Goal  Goal: *STG/LTG: Complies with medication therapy  Outcome: Progressing Towards Goal  Goal: *STG/LTG: No longer expresses self destructive or suicidal thoughts  Outcome: Progressing Towards Goal

## 2023-02-05 NOTE — ED NOTES
Pt reports that he feels tired. A/O x4. Pt reports that he did consume a glass of wine this evening.

## 2023-02-05 NOTE — PROGRESS NOTES
0715: Bedside shift report received from Summer Onofre RN.     0800: Shift assessment complete. Pt w/ RASS -3. Eyes open briefly to verbal command and localizing pain. Suicide precautions in place w/ 1:1 sitter. NSR on telemetry. Lungs diminished on RA. Monitoring etCO2. BS active. Pt remains NPO. External catheter in place. See flowsheets for details. 0930: Psych consult called. Awaiting call back. 0935: Follow up EKG completed. WNL. Will repeat Q4.     0938: Pt increasingly more alert. Lethargic/drowsy at this time. Answering orientation questions correctly. Will continue to monitor. 1200: Reassessment complete. Patient becoming more alert, answering questions. Changes documented in flowsheets. 1600: Reassessment complete. No changes at this time. See flowsheets. 1900: Bedside shift report given to [place] RN.

## 2023-02-05 NOTE — PROGRESS NOTES
Hospitalist Progress Note    NAME: Nandini Brown III   :  1990   MRN:  [id number]       Assessment / Plan:  Suicide attempt by Drug overdose  Acute metabolic encephalopathy due to TCA overdose + ativan  Acetaminophen, salicylate, and alcohol level all negative  Ingested 20 tabs of doxepin 25mg. Monitor qrs complex, give bicarb for QRS complex prolonged (wider 0.12) or ventricular arrhythmia  IVF  Seizure precautions  Psychiatry consulted. HIV  Cont home HIV meds. Hypokalemia  Replete prn      Estimated discharge date:  pending psych eval    Code status: full  DVT prophylaxis: Lovenox  Next of kin: unable to name NOK       Subjective:     Chief Complaint / Reason for Physician Visit  Pt NAD. Discussed with RN events overnight. Review of Systems:  Symptom Y/N Comments  Symptom Y/N Comments   Fever/Chills    Chest Pain     Poor Appetite    Edema     Cough    Abdominal Pain     Sputum    Joint Pain     SOB/CHAUDHARY    Pruritis/Rash     Nausea/vomit    Tolerating PT/OT     Diarrhea    Tolerating Diet     Constipation    Other       Could NOT obtain due to:      Objective:     VITALS:   Last 24hrs VS reviewed since prior progress note.  Most recent are:  Patient Vitals for the past 24 hrs:   Temp Pulse Resp BP SpO2   23 1300 -- 84 17 111/78 100 %   23 1200 98 °F (36.7 °C) 78 16 103/77 100 %   23 1100 -- 75 18 102/82 100 %   23 1000 -- 76 20 108/80 100 %   23 0900 -- 90 16 100/77 99 %   23 0830 -- 84 16 90/61 98 %   23 0800 98.4 °F (36.9 °C) 80 17 90/61 99 %   23 0700 -- 88 23 (!) 87/63 98 %   23 0600 98.4 °F (36.9 °C) 77 19 120/83 98 %   23 0500 98.1 °F (36.7 °C) 91 21 96/73 97 %   23 0400 97.4 °F (36.3 °C) (!) 109 20 105/79 99 %   23 0300 97.2 °F (36.2 °C) 91 17 109/80 97 %   23 0200 (!) 96.7 °F (35.9 °C) 97 16 105/78 98 %   23 0145 -- 97 -- 117/86 99 %   23 0130 -- (!) 107 -- (!) 111/90 99 %   23 0115 -- 99 -- (!) 120/96 98 %   02/05/23 0100 (!) 95.8 °F (35.4 °C) (!) 107 -- 116/84 98 %   02/05/23 0045 -- (!) 102 -- 113/82 99 %   02/05/23 0026 (!) 94.4 °F (34.7 °C) 90 15 (!) 126/93 100 %   02/04/23 2337 -- (!) 135 -- -- 98 %   02/04/23 2331 -- (!) 115 -- 122/84 98 %   02/04/23 2323 -- (!) 114 -- -- 96 %   02/04/23 2316 -- (!) 115 -- 119/83 97 %   02/04/23 2311 -- (!) 112 -- -- 96 %   02/04/23 2308 -- (!) 115 -- -- 96 %   02/04/23 2256 -- (!) 114 -- 111/80 96 %   02/04/23 2253 -- (!) 121 -- -- 94 %   02/04/23 2245 -- (!) 121 -- -- 95 %   02/04/23 2238 -- (!) 117 -- -- 94 %   02/04/23 2230 -- (!) 120 -- (!) 127/90 96 %   02/04/23 2215 -- (!) 122 -- (!) 122/90 96 %   02/04/23 2200 -- (!) 106 -- 114/83 100 %   02/04/23 2159 -- 81 -- -- 100 %   02/04/23 2144 -- (!) 151 -- 116/76 98 %   02/04/23 2140 98.3 °F (36.8 °C) -- -- -- --   02/04/23 2129 -- (!) 103 -- -- 100 %   02/04/23 2128 -- -- -- -- 100 %   02/04/23 2127 -- 87 -- 111/84 100 %   02/04/23 2122 -- 81 -- -- 100 %   02/04/23 2112 -- 75 22 115/81 100 %   02/04/23 2107 -- 73 18 -- 100 %   02/04/23 2102 -- 70 20 121/86 100 %       Intake/Output Summary (Last 24 hours) at 2/5/2023 1313  Last data filed at 2/5/2023 1300  Gross per 24 hour   Intake 1075 ml   Output 750 ml   Net 325 ml        I had a face to face encounter and independently examined this patient on 2/5/2023, as outlined below:  PHYSICAL EXAM:  General: WD, WN. Alert, cooperative, no acute distress    EENT:  EOMI. Anicteric sclerae. MMM  Resp:  CTA bilaterally, no wheezing or rales. No accessory muscle use  CV:  Regular  rhythm,  No edema  GI:  Soft, Non distended, Non tender. +Bowel sounds  Neurologic:  Alert and oriented X 3, normal speech  Psych:   Good insight. Not anxious nor agitated  Skin:  No rashes.   No jaundice    Reviewed most current lab test results and cultures  YES  Reviewed most current radiology test results   YES  Review and summation of old records today    NO  Reviewed patient's current orders and MAR    YES  PMH/SH reviewed - no change compared to H&P  ________________________________________________________________________  Care Plan discussed with:    Comments   Patient x    Family      RN x    Care Manager     Consultant                        Multidiciplinary team rounds were held today with , nursing, pharmacist and clinical coordinator. Patient's plan of care was discussed; medications were reviewed and discharge planning was addressed. ________________________________________________________________________  Total NON critical care TIME:  35   Minutes    Total CRITICAL CARE TIME Spent:   Minutes non procedure based      Comments   >50% of visit spent in counseling and coordination of care     ________________________________________________________________________  Rosalio Richmond MD     Procedures: see electronic medical records for all procedures/Xrays and details which were not copied into this note but were reviewed prior to creation of Plan. LABS:  I reviewed today's most current labs and imaging studies.   Pertinent labs include:  Recent Labs     02/05/23 0221 02/04/23 2119   WBC 4.7 5.4   HGB 13.1 13.4   HCT 37.6 39.7    209     Recent Labs     02/05/23 0221 02/04/23 2119    140   K 4.1 2.7*   * 104   CO2 26 28   GLU 89 90   BUN 6 8   CREA 0.71 0.94   CA 8.2* 8.7   MG 2.3 2.4   PHOS 2.8  --    ALB 3.6 4.2   TBILI 0.5 0.5   ALT 16 18       Signed: Rosalio Richmond MD

## 2023-02-05 NOTE — ED NOTES
Pt became increasingly restless and agitated. Pt went from laying supine to laying prone and unhooked his monitoring. [facility]. Verbal order for 2mg of Ativan. Charge RN Nilesh Dash at bedside with tech support. Ativan given and pt hooked back up to monitor. Pt has 1-1 sitter at door.

## 2023-02-05 NOTE — ED NOTES
ADMISSION SBAR NOTE    IP UNIT CALLED NOTE IS READY: Yes Spoke to Em Quintana RN  IF there are questions Call Alida Muñoz RN at phone # [phone]  SITUATION/BACKGROUND:    Patient is being transferred to [facility] Critical Care 1, Room# 2540    Patient's Chief Complaint was drug overdose 500 mg of Doxepin and is admitted for SI attempt with drug overdose. CODE STATUS: Full Code    ISOLATION/PRECAUTIONS: Yes   ISOLATION TYPE: n/a    Called outstanding consults: Yes     Are there still sign and held orders that need to be released? Yes    STAT labs collected: Yes  REPEAT LACTIC ACID DUE? No  TIME DUE: n/a    All STAT orders are complete: Yes    The following personal items will be sent with the patient during transfer to the floor: All valuables:   ITEM:    ITEM:    ITEM:    ITEM:    ITEM:         ASSESSMENT:    CIWA Assessment: No  Last Score: n/a    NEURO: a/o x4, sleeping at this time    NIH SCORE:    ALONSO SCREENING:      NEURO ASSESSMENT: Neuro  Neurologic State: Alert, Lethargic, Eyes open to voice, Drowsy (02/04/23 2128)  Orientation Level: Oriented X4 (02/04/23 2128)  Cognition: Follows commands, Appropriate safety awareness, Impaired decision making, Appropriate for age attention/concentration (02/04/23 2128)  Speech: Clear, Delayed responses (02/04/23 2128)    Is patient impulsive? Yes  Is patient oriented? Yes   Do they follow commands? Yes  Is the patient ambulatory? Yes Device need assist    FALL RISK? Yes   INTERVENTIONS: assist    RESPIRATORY: Is patient on Oxygen? No    OXYGEN: Oxygen Therapy  O2 Device: None (Room air) (02/04/23 2128)  O2 Flow Rate (L/min): 2 l/min (02/04/23 2102)    CARDIAC: Is cardiac monitoring ordered? Yes Last Rhythm: NSR  Patient to transfer with tele box on? Yes  Is patient using a LIFE VEST? No     LINE ACCESS:   Peripheral IV 02/04/23 Right Antecubital (Active)        /GI: CONTINENT BOWEL/BLADDER?  Yes  URINARY OUTPUT: voiding  CHRONIC OR ACUTE? n/a   If CHRONIC, is it 1days old, was it changed prior to specimen collection? n/a  WAS UA WITH REFLEX SENT TO LAB? No  IF NO, COLLECT AND SEND PRIOR TO TRANSPORT TO INPATIENT AREA    INTEGUMENTARY:   IS THE PATIENT UNDRESSED? Yes  ARE THERE WOUNDS PRESENT? No   ARE THE WOUNDS DOCUMENTED? No     RESTRAINTS IN USE: No      IS DOCUMENTATION COMPLETE: Yes  Is there a current Order?  Yes When does it ? n/a    Vital Signs  Level of Consciousness: Alert (0) (23)  Temp: 98.3 °F (36.8 °C) (23)  Temp Source: Oral (23)  Pulse (Heart Rate): (!) 115 (23)  Heart Rate Source: Monitor (23)  Cardiac Rhythm: Sinus Rhythm (23)  Resp Rate: 22 (23)  BP: 122/84 (23)  MAP (Monitor): 97 (23)  MAP (Calculated): 97 (23)  BP 1 Location: Right upper arm (23)  BP 1 Method: Automatic (23)  BP Patient Position: Semi fowlers, At rest (23)  Pain 1  Pain Scale 1: Numeric (0 - 10) (23)  Pain Intensity 1: 0 (23)  Patient Stated Pain Goal: 0 (23)      REVIEW:

## 2023-02-05 NOTE — ED NOTES
Contacted poison control. Poison control recommended that we get a magnesium level. States that pt could be at risk for seizures, dysrhytmias, and a widening QRS, typically seen within 6 hours. Pt is on cardiac/respiratory monitoring and end-tidal CO2 monitoring. MD an aware. Padded pt's side rails with blankets. Changed pt into paper scrubs.

## 2023-02-05 NOTE — PROGRESS NOTES
Transition of Care Plan:    RUR:11%  Disposition:home  If SNF or IPR: Date FOC offered:n/a  Date FOC received:n/a  Date authorization started with reference number:n/a  Date authorization received and expires:n/a  Accepting facility:n/a  Follow up appointments:to be scheduled   DME needed:none  Transportation at [facility] to be set up  [address] or means to access home:  yes       IM Medicare Letter:n/a, patient is un-insured   Is patient a [place] and connected with the [facility]?   no             If yes, was [employer] transfer form completed and VA notified? N/a  Caregiver Contact:n/a  Discharge Caregiver contacted prior to discharge? N/a  Care Conference needed?:  no               Previous HH/SNF/IPR: none    *Patient is uninsured and stated he does not qualify for Medicaid*    Reason for Admission:  suicide attempt by drug ingestion                      RUR Score:     11%                Plan for utilizing home health:   no recommendations        PCP: First and Last name:  Claudell Ped, DO     Name of Practice:    Are you a current patient: Yes/No:    Approximate date of last visit:    Can you participate in a virtual visit with your PCP:                     Current Advanced Directive/Advance Care Plan: Full Code      Healthcare Decision Maker:   Click here to complete [address] including selection of the Healthcare Decision Maker Relationship (ie \"Primary\")                             Transition of Care Plan:                        Patient lives alone in an apartment. Patient is independent in his ADLs/IADLs and does not have any DME. Patient stated he uses [address] for his prescriptions. Patient stated he has previously applied for [place] and has been denied due to income. Care Management Interventions  PCP Verified by CM: Yes  Mode of Transport at Discharge:  Other (see comment) (round trip)  Transition of Care Consult (CM Consult): Discharge Planning  MyChart Signup: No  Discharge Durable Medical Equipment: No  Health Maintenance Reviewed: Yes  Physical Therapy Consult: No  Occupational Therapy Consult: No  Speech Therapy Consult: No  Support Systems: Other Family Member(s), Parent(s)  Confirm Follow Up Transport: Self   Resource Information Provided?: No  Discharge Location  Patient Expects to be Discharged to[de-identified] Home with family assistance    5:11 PM  BEBA Agee

## 2023-02-05 NOTE — PROGRESS NOTES
2334 - TRANSFER - IN REPORT:    Verbal report received from [place], RN(name) on Rodrigue Ohms III  being received from ED(unit) for routine progression of care      Report consisted of patients Situation, Background, Assessment and   Recommendations(SBAR). Information from the following report(s) SBAR, Kardex, ED Summary, Intake/Output, MAR, Recent Results, Med Rec Status, and Cardiac Rhythm Sinus Tachy  was reviewed with the receiving nurse. Opportunity for questions and clarification was provided. 0456 - Received patient from ED per stretcher. On room air. With Potassium Chloride infusing peripherally at right AC, no infiltration noted. On cardiac monitor sinus tachycardia. Ushered to bed safely. Attached to monitor. Initial vital signs checked and recorded. Assessment documented. No eye opening to verbal/painful stimuli but localizing. On room air maintaining saturation of 97%. 0030 - Wiped with CHG, gown changed. Skin intact, dry and warm to touch but hypothermic. 0040 - EKG done then Q4    0045 - Applied warm blanket. 0100 - Closely monitored. 0210 - Spoke to [employer]), updates given. To send lactate and CPK.    0235 - Blood sample sent to lab and urine sample for analysis and drug screening. 0400 - Re-assessment done and documented, see flow sheet. 0 - Spoke to [employer]) over the phone and updates given, no recommendations from her as of now. 0700 - Bedside and Verbal shift change report given to Sophia Dugan RN (oncoming nurse) by Ira Keys RN (offgoing nurse). Report included the following information SBAR, Kardex, ED Summary, Intake/Output, MAR, Recent Results, Med Rec Status, and Cardiac Rhythm Sinus Rhythm .

## 2023-02-06 LAB
ANION GAP SERPL CALC-SCNC: 5 MMOL/L (ref 5–15)
BASOPHILS # BLD: 0 K/UL (ref 0–0.1)
BASOPHILS NFR BLD: 1 % (ref 0–1)
BUN SERPL-MCNC: 10 MG/DL (ref 6–20)
BUN/CREAT SERPL: 12 (ref 12–20)
CALCIUM SERPL-MCNC: 8.8 MG/DL (ref 8.5–10.1)
CHLORIDE SERPL-SCNC: 110 MMOL/L (ref 97–108)
CO2 SERPL-SCNC: 26 MMOL/L (ref 21–32)
CREAT SERPL-MCNC: 0.86 MG/DL (ref 0.7–1.3)
DIFFERENTIAL METHOD BLD: ABNORMAL
EOSINOPHIL # BLD: 0.1 K/UL (ref 0–0.4)
EOSINOPHIL NFR BLD: 1 % (ref 0–7)
ERYTHROCYTE [DISTWIDTH] IN BLOOD BY AUTOMATED COUNT: 13.9 % (ref 11.5–14.5)
GLUCOSE SERPL-MCNC: 98 MG/DL (ref 65–100)
HCT VFR BLD AUTO: 37.9 % (ref 36.6–50.3)
HGB BLD-MCNC: 12.8 G/DL (ref 12.1–17)
IMM GRANULOCYTES # BLD AUTO: 0 K/UL (ref 0–0.04)
IMM GRANULOCYTES NFR BLD AUTO: 0 % (ref 0–0.5)
LYMPHOCYTES # BLD: 1.8 K/UL (ref 0.8–3.5)
LYMPHOCYTES NFR BLD: 38 % (ref 12–49)
MAGNESIUM SERPL-MCNC: 2.1 MG/DL (ref 1.6–2.4)
MCH RBC QN AUTO: 32.3 PG (ref 26–34)
MCHC RBC AUTO-ENTMCNC: 33.8 G/DL (ref 30–36.5)
MCV RBC AUTO: 95.7 FL (ref 80–99)
MONOCYTES # BLD: 0.4 K/UL (ref 0–1)
MONOCYTES NFR BLD: 9 % (ref 5–13)
NEUTS SEG # BLD: 2.5 K/UL (ref 1.8–8)
NEUTS SEG NFR BLD: 51 % (ref 32–75)
NRBC # BLD: 0 K/UL (ref 0–0.01)
NRBC BLD-RTO: 0 PER 100 WBC
PHOSPHATE SERPL-MCNC: 3.3 MG/DL (ref 2.6–4.7)
PLATELET # BLD AUTO: 186 K/UL (ref 150–400)
PMV BLD AUTO: 10.4 FL (ref 8.9–12.9)
POTASSIUM SERPL-SCNC: 3.7 MMOL/L (ref 3.5–5.1)
RBC # BLD AUTO: 3.96 M/UL (ref 4.1–5.7)
SODIUM SERPL-SCNC: 141 MMOL/L (ref 136–145)
WBC # BLD AUTO: 4.8 K/UL (ref 4.1–11.1)

## 2023-02-06 PROCEDURE — 84100 ASSAY OF PHOSPHORUS: CPT

## 2023-02-06 PROCEDURE — 80048 BASIC METABOLIC PNL TOTAL CA: CPT

## 2023-02-06 PROCEDURE — 74011250636 HC RX REV CODE- 250/636: Performed by: HOSPITALIST

## 2023-02-06 PROCEDURE — 85025 COMPLETE CBC W/AUTO DIFF WBC: CPT

## 2023-02-06 PROCEDURE — 74011000250 HC RX REV CODE- 250: Performed by: NURSE PRACTITIONER

## 2023-02-06 PROCEDURE — 74011250636 HC RX REV CODE- 250/636: Performed by: NURSE PRACTITIONER

## 2023-02-06 PROCEDURE — 74011250637 HC RX REV CODE- 250/637: Performed by: NURSE PRACTITIONER

## 2023-02-06 PROCEDURE — 36415 COLL VENOUS BLD VENIPUNCTURE: CPT

## 2023-02-06 PROCEDURE — 83735 ASSAY OF MAGNESIUM: CPT

## 2023-02-06 PROCEDURE — 65270000046 HC RM TELEMETRY

## 2023-02-06 RX ADMIN — SODIUM CHLORIDE, PRESERVATIVE FREE 10 ML: 5 INJECTION INTRAVENOUS at 06:24

## 2023-02-06 RX ADMIN — SODIUM CHLORIDE, PRESERVATIVE FREE 10 ML: 5 INJECTION INTRAVENOUS at 21:35

## 2023-02-06 RX ADMIN — SODIUM CHLORIDE, PRESERVATIVE FREE 10 ML: 5 INJECTION INTRAVENOUS at 15:35

## 2023-02-06 RX ADMIN — SODIUM CHLORIDE, POTASSIUM CHLORIDE, SODIUM LACTATE AND CALCIUM CHLORIDE 75 ML/HR: 600; 310; 30; 20 INJECTION, SOLUTION INTRAVENOUS at 02:00

## 2023-02-06 RX ADMIN — SODIUM CHLORIDE, POTASSIUM CHLORIDE, SODIUM LACTATE AND CALCIUM CHLORIDE 75 ML/HR: 600; 310; 30; 20 INJECTION, SOLUTION INTRAVENOUS at 15:35

## 2023-02-06 RX ADMIN — ELVITEGRAVIR, COBICISTAT, EMTRICITABINE, AND TENOFOVIR ALAFENAMIDE 1 TABLET: 150; 150; 200; 10 TABLET ORAL at 11:30

## 2023-02-06 NOTE — PROGRESS NOTES
Called patient's mother times two and she states she will call me back. Bee Ang RN BSN CRM        [phone]

## 2023-02-06 NOTE — PROGRESS NOTES
Hospitalist Progress Note    NAME: Greta Brunson III   :  1990   MRN:  [id number]       Assessment / Plan:  Suicide attempt by Drug overdose  Acute metabolic encephalopathy due to TCA overdose + ativan  Acetaminophen, salicylate, and alcohol level all negative  Ingested 20 tabs of doxepin 25mg. Monitor qrs complex, give bicarb for QRS complex prolonged (wider 0.12) or ventricular arrhythmia  IVF  Seizure precautions  Psychiatry consulted. Patient medically stable     HIV  Cont home HIV meds. Hypokalemia  Replete prn      Estimated discharge date:  pending psych eval    Code status: full  DVT prophylaxis: Lovenox  Patient medically stable need transfer to Psych        Subjective:     Chief Complaint / Reason for Physician Visit  Pt NAD. Discussed with RN events overnight. Review of Systems:  Symptom Y/N Comments  Symptom Y/N Comments   Fever/Chills    Chest Pain     Poor Appetite    Edema     Cough    Abdominal Pain     Sputum    Joint Pain     SOB/CHAUDHARY    Pruritis/Rash     Nausea/vomit    Tolerating PT/OT     Diarrhea    Tolerating Diet     Constipation    Other       Could NOT obtain due to:      Objective:     VITALS:   Last 24hrs VS reviewed since prior progress note.  Most recent are:  Patient Vitals for the past 24 hrs:   Temp Pulse Resp BP SpO2   23 1716 98.6 °F (37 °C) 93 19 114/88 93 %   23 1700 -- 75 17 -- 98 %   23 1600 -- 89 22 -- 99 %   23 1500 -- 97 26 -- 99 %   23 1400 -- 99 24 -- 100 %   23 1300 -- 99 21 -- 99 %   23 1200 98.7 °F (37.1 °C) 82 26 (!) 119/90 100 %   23 1000 -- 100 25 -- 100 %   23 0900 -- (!) 103 25 -- 100 %   23 0818 98.8 °F (37.1 °C) 93 23 (!) 125/96 92 %   23 0407 (!) 47.7 °F (8.7 °C) 98 21 113/67 99 %   23 0019 97.9 °F (36.6 °C) 75 19 112/79 95 %   23 1930 98.1 °F (36.7 °C) 95 -- 119/89 100 %   23 1900 -- 87 -- 114/ 100 %   23 1845 -- 89 -- 108/85 100 % Intake/Output Summary (Last 24 hours) at 2/6/2023 1808  Last data filed at 2/6/2023 1600  Gross per 24 hour   Intake 2200 ml   Output 1650 ml   Net 550 ml          I had a face to face encounter and independently examined this patient on 2/6/2023, as outlined below:  PHYSICAL EXAM:  General: WD, WN. Alert, cooperative, no acute distress    EENT:  EOMI. Anicteric sclerae. MMM  Resp:  CTA bilaterally, no wheezing or rales. No accessory muscle use  CV:  Regular  rhythm,  No edema  GI:  Soft, Non distended, Non tender. +Bowel sounds  Neurologic:  Alert and oriented X 3, normal speech  Psych:   Good insight. Not anxious nor agitated  Skin:  No rashes. No jaundice    Reviewed most current lab test results and cultures  YES  Reviewed most current radiology test results   YES  Review and summation of old records today    NO  Reviewed patient's current orders and MAR    YES  PMH/ reviewed - no change compared to H&P  ________________________________________________________________________  Care Plan discussed with:    Comments   Patient x    Family      RN x    Care Manager     Consultant                        Multidiciplinary team rounds were held today with , nursing, pharmacist and clinical coordinator. Patient's plan of care was discussed; medications were reviewed and discharge planning was addressed. ________________________________________________________________________  Total NON critical care TIME:  35   Minutes    Total CRITICAL CARE TIME Spent:   Minutes non procedure based      Comments   >50% of visit spent in counseling and coordination of care     ________________________________________________________________________  Rosilyn Easter, MD     Procedures: see electronic medical records for all procedures/Xrays and details which were not copied into this note but were reviewed prior to creation of Plan.       LABS:  I reviewed today's most current labs and imaging studies. Pertinent labs include:  Recent Labs     02/06/23  0404 02/05/23 0221 02/04/23 2119   WBC 4.8 4.7 5.4   HGB 12.8 13.1 13.4   HCT 37.9 37.6 39.7    184 209       Recent Labs     02/06/23 0404 02/05/23 0221 02/04/23 2119    140 140   K 3.7 4.1 2.7*   * 111* 104   CO2 26 26 28   GLU 98 89 90   BUN 10 6 8   CREA 0.86 0.71 0.94   CA 8.8 8.2* 8.7   MG 2.1 2.3 2.4   PHOS 3.3 2.8  --    ALB  --  3.6 4.2   TBILI  --  0.5 0.5   ALT  --  16 18         Signed:  Azul Dent MD

## 2023-02-06 NOTE — BSMART NOTE
Pt is requesting to discharge home. Psychiatric NP, Dorene Kim, assessed pt today, is not in agreement, and is requesting [facility] assess pt for safety and possible TDO. Liaison spoke with pt's RN, Manish Ramirez, who reports pt's mother, father and therapist were involved in conference call with Pt and psych NP, Dorene Kim today. Pt expressed his desire to discharge home and family and therapist all agree pt should NOT go to inpatient [facility]. Liaison contacted medical Bee LAN, and Hugh Ross to update on situation. Gutierrez Sport is contacting family to discuss safety plan and will continue the follow-up with Cody.

## 2023-02-06 NOTE — BSMART NOTE
Initial BSMART Liaison Assessment Form     Section I - Integrated Summary    Chief Complaint is OD on Doxepin (20 tablets). LOS:  2     Presenting problem/Summary:  Per chart, Pt arrives via EMS from home. Pt took 20 (25mg) of Doxepin approx 1 hr ago. Total of 500mg. Pt was having SI thoughts. There was a 3rd party call to EMS. In route, pt was alert and ambulatory. Pt was sitting up and stating he was feeling \"funny\" and went of of consciousness in route. Pt is a/o x4 on arrival.    Pt is received sitting up in bed reading with sitter at bedside. Pt is alert, oriented, thoughts logical and goal-directed, no confusion or psychosis noted. He is calm, cooperative, pleasant, bright affect. Pt reports precipitating factors as; he and his Ex, from 3yrs ago, were texting and his private texts were made public to others, which triggered him. He reports feeling embarrassed, angry, betrayed, and overwhelmed by all of these emotions at one time. He shares he immediately went back to how he felt in 7th grade when he was bullied. He reports he normally has his emotions in check but this incident blindsided him. He reports his most therapeutic coping skill is journaling, and shares he has a book published on [employer]. He also reports a strong support system of family, friends, and states he has 2 zoom sessions per week with his therapist Rimma Arechiga. Pt reports he is willing to follow-up with [facility] for medication mgmt services, as a new patient. Liaison updated EDYTA, Bee, and [place] with [employer]. Dejuan Jackson will contact family and continue follow-up with [employer] regarding safe discharge vs TDO. Dejuan Jackson will also attempt to schedule a new pt appt for medication mgmt with [facility]. Precipitant Factors are depression. The information is given by the patient and past medical records.   Current Psychiatrist and/or  is therapist, Chiqui Novak Rambo Monroe  Previous Hospitalizations/Treatment: 2 prior hospitalizations    Lethality Assessment:  The potential for suicide noted by the following: current attempt, past attempt in 2011 OD of Tylenol & Coricidin and 2004 OD on medications. The potential for homicide is not noted. The patient has not been a perpetrator of sexual or physical abuse. There are not pending charges. The patient is felt to be at risk for self-harm or harm to others. Section II - Psychosocial  The patient's overall mood and attitude is \"much better\", calm, cooperative. Feelings of helplessness and hopelessness are not observed. Generalized anxiety is not observed. Panic is not observed. Phobias are not observed. Obsessive compulsive tendencies are not observed. Section III - Mental Status Exam  The patient's appearance shows no evidence of impairment. The patient's behavior shows no evidence of impairment. The patient is oriented to time, place, person and situation. The patient's speech shows no evidence of impairment. The patient's mood is euthymic. The range of affect shows no evidence of impairment. The patient's thought content demonstrates no evidence of impairment. The thought process shows no evidence of impairment. The patient's perception shows no evidence of impairment. The patient's memory shows no evidence of impairment. The patient's appetite shows no evidence of impairment. The patient's sleep shows no evidence of impairment. The patient's insight shows no evidence of impairment. The patient's judgement shows no evidence of impairment. The patient has demonstrated mental capacity to provide informed consent. Section IV - Substance Abuse  The patient is using substances. The patient is using tobacco by inhalation for 5-10 years with last use on PTA, alcohol for greater than 10 years with last use on UNK, and cannabis by inhalation for greater than 10 years with last use on PTA.  UDS: (+) THC    BAL: 0    Section V - Medical  Past Medical History:   Diagnosis Date    Anxiety     Asthma     Depression     Depression 8/22/2016    HIV (human immunodeficiency virus infection) ([facility] [address] 12/3/2013       Section VI - Living Arrangements  The patient is single. The patient lives alone. The patient has no children. The patient does plan to return home upon discharge. The patient's source of income comes from employment. The patient's greatest support comes from family, friends, and therapist, Ash Salazar and this person will be involved with the treatment. The patient has been in an event described as horrible or outside the realm of ordinary life experience either currently or in the past. The patient has not been a victim of sexual/physical abuse. Section VII - Other Areas of Clinical Concern    The highest grade achieved is graduated HS with the overall quality of school experience being described as NA. The patient is currently employed and speaks [place] as a primary language. The patient has no communication impairments affecting communication. The patient's preference for learning can be described as: can read and write adequately.   The patient's hearing is normal.  The patient's vision is normal.    The patient reports coping skills include: journaling, spending time with friends    Stephanie Torres LCSW

## 2023-02-06 NOTE — PROGRESS NOTES
0730- Bedside and Verbal shift change report given to [facility] (oncoming nurse) by Joshua Loredo. Deandre Lee RN (offgoing nurse). Report included the following information SBAR, Kardex, Intake/Output, Recent Results, Cardiac Rhythm NSR, and Alarm Parameters . 8214- Shift assessment completed; see flow sheet for details. Pt is A/V/Ox4; cooperative; following commands. Currently in NSR; low sinus tach with exertion; BP stable. Lungs are clear; diminished in the bases; remains on room air. ABD is semi-soft; BS active. Voiding via the urinal. Skin is warm and dry. Pt denies pain/discomfort at this time. 1100- Psych re-consulted for the patient    4104- Reassessment completed; see flow sheet for details. No acute changes in pt assessment. Remains with 1:1 sitter at the bedside     1330- Psych consult being held via zoom, pt denying need for inpatient psych. Will consult [facility] for additional evaluation     [address] [place] [address] with Case management regarding need for [place] crisis. CM to further evaluate     1520- Per CM, she has spoken with pt's mother; father and therapist. Safety plans has been made for the pt at discharge, [facility] is not needed for further evaluation due to a safety plan being in place. Dr. Cheri Payan made aware of the above information. 1610- Reassessment completed; see flow sheet for details. No acute changes in pt assessment. Sitter remains at the bedside     1845- TRANSFER - OUT REPORT:    Verbal report given to [facility] RN(name) on Krishna Brownbeth III  being transferred to U 2303(unit) for routine progression of care       Report consisted of patients Situation, Background, Assessment and   Recommendations(SBAR). Information from the following report(s) SBAR, Kardex, Intake/Output, Recent Results, Cardiac Rhythm NSR, and Alarm Parameters  was reviewed with the receiving nurse.     Lines:   Peripheral IV 64/99/47 Left;Upper Basilic (Active)   Site Assessment Clean, dry, & intact 02/06/23 4704 Phlebitis Assessment 0 02/06/23 1716   Infiltration Assessment 0 02/06/23 1716   Dressing Status Clean, dry, & intact 02/06/23 1716   Dressing Type Transparent 02/06/23 1716   Hub Color/Line Status Green; Infusing 02/06/23 1716        Opportunity for questions and clarification was provided.       Patient transported with:   Monitor  Patient-specific medications from Pharmacy  Registered Nurse  Tech

## 2023-02-06 NOTE — CONSULTS
PSYCHIATRY CONSULT NOTE:    REASON FOR CONSULT: Suicide attempt    INTERVAL HISTORY  2/6: Patient is sitting up in bed. He reports feeling better today and denies suicidal/homicidal thoughts and VA hallucinations. He reports his sleep and appetite are ok. He is not agreeable to inpatient psychiatric admission and wants to be assessed by [facility]. Informations relayed to the nurse. HISTORY OF PRESENTING COMPLAINT:  David Akers III is a 34 y.o. BLACK/ male who is currently admitted to the ICU following suicide attempt via overdose of Doxepin. Per RN Saddie Sandhoff patient reported taking 20 Doxepin to end his life and has been lethargic most of this day. Patient is alert and oriented x 4. Patient is engaged in interview. Reports intentional overdose. Patient refused to discuss events leading up to his suicide attempt. When ask about anxiety and stressors, he refused to discuss. States history of depression and anxiety since childhood. Lately has not felt like getting out of bed and just wants to cry. Currently not on anxiety medications for his mental health. States no appetite. Reports his smokes cigarettes and cannabis daily, and occasion use of ETOH. Reports 2 previous mental health hospitalizations, no other suicide attempts. Denies A/VH. States no longer experiencing SI or plan. Denies HI or plan. PAST PSYCHIATRIC HISTORY and SUBSTANCE ABUSE HISTORY:  SARAN  MDD  Tobacco use d/o  Cannabis use d/o      PAST MEDICAL HISTORY:    Please see H&P for details. Past Medical History:   Diagnosis Date    Anxiety     Asthma     Depression     Depression 8/22/2016    HIV (human immunodeficiency virus infection) ([facility] [address] 12/3/2013     Prior to Admission medications    Medication Sig Start Date End Date Taking?  Authorizing Provider   elvitegravir-cobicistat-emtricitabine-tenofovir alafenamide Luann Montalvo) tab tablet TAKE 1 TABLET BY MOUTH EVERY DAY WITH BREAKFAST 1/10/23  Yes Edgar Castano MD doxepin (SINEquan) 25 mg capsule Take 1 Capsule by mouth nightly. 6/3/22  Hilda Morrell NP     Vitals:    02/06/23 0818 02/06/23 0900 02/06/23 1000 02/06/23 1200   BP: (!) 125/96   (!) 119/90   Pulse: 93 (!) 103 100 82   Resp: 23 25 25 26   Temp: 98.8 °F (37.1 °C)      SpO2: 92% 100% 100% 100%   Weight:       Height:         Lab Results   Component Value Date/Time    WBC 4.8 02/06/2023 04:04 AM    HGB 12.8 02/06/2023 04:04 AM    HCT 37.9 02/06/2023 04:04 AM    PLATELET 104 09/65/6200 04:04 AM    MCV 95.7 02/06/2023 04:04 AM     Lab Results   Component Value Date/Time    Sodium 141 02/06/2023 04:04 AM    Potassium 3.7 02/06/2023 04:04 AM    Chloride 110 (H) 02/06/2023 04:04 AM    CO2 26 02/06/2023 04:04 AM    Anion gap 5 02/06/2023 04:04 AM    Glucose 98 02/06/2023 04:04 AM    BUN 10 02/06/2023 04:04 AM    Creatinine 0.86 02/06/2023 04:04 AM    BUN/Creatinine ratio 12 02/06/2023 04:04 AM    GFR est AA >60 03/05/2021 10:31 AM    GFR est non-AA >60 03/05/2021 10:31 AM    Calcium 8.8 02/06/2023 04:04 AM    Bilirubin, total 0.5 02/05/2023 02:21 AM    Alk. phosphatase 78 02/05/2023 02:21 AM    Protein, total 6.8 02/05/2023 02:21 AM    Albumin 3.6 02/05/2023 02:21 AM    Globulin 3.2 02/05/2023 02:21 AM    A-G Ratio 1.1 02/05/2023 02:21 AM    ALT (SGPT) 16 02/05/2023 02:21 AM    AST (SGOT) 12 (L) 02/05/2023 02:21 AM     No results found for: VALF2, VALAC, VALP, VALPR, DS6, CRBAM, CRBAMP, CARB2, XCRBAM  No results found for: LITHM  RADIOLOGY REPORTS:(reviewed/updated 2/6/2023)  No results found. No results found for: Lisa Chandranicky [id number], [id number], [id number], PREGU, POCHCG, MHCGN, HCGQR, THCGA1, SHCG, HCGN, [address], HCGURQLPOC    PSYCHOSOCIAL HISTORY:  Lives alone  Single  House keeper  Graduated high school    MENTAL STATUS EXAM:  General appearance:   appropriate for setting  psychomotor activity is WNL   Eye contact: Good eye contact  Speech: Spontaneous, soft, decreased output.    Affect : mood congreunt  Mood: \"better\"  Thought Process: Logical, goal directed  Perception: Denies AH or VH. Thought Content: Denies SI or Plan  Insight: Poor  Judgement: Poor  Cognition: Intact grossly. ASSESSMENT AND PLAN:  Emily Sam III meets criteria for a diagnosis of recurrent major depressive disorder, severe and general anxiety disorder  . Patient is not agreeable to inpatient psychiatric admission. Please contact [facility] for a TDO assessment when patient is medically cleared. Thank your your consult. Please feel free to consult us again as needed.

## 2023-02-06 NOTE — PROGRESS NOTES
Patient moved to room [phone] from CCU at this time. Sitter at bedside. Denies thoughts of hurting himself or others at this time. Alert. Oriented x 4. Respirations not labored. Report [facility] from [facility] at this time. 1926  Bedside shift report given to [address] [place] [address] Bronson RN at this time.

## 2023-02-06 NOTE — PROGRESS NOTES
Transition of Care Plan:     RUR:11%  Disposition:home  If SNF or IPR: Date FOC offered:n/a  Date FOC received:n/a  Date authorization started with reference number:n/a  Date authorization received and expires:n/a  Accepting facility:n/a  Follow up appointments:to be scheduled   DME needed:none  Transportation at [facility] to be set up  [address] or means to access home:  yes       IM Medicare Letter:n/a, patient is un-insured   Is patient a  and connected with the [place]?   no             If yes, was [place] transfer form completed and VA notified? N/a  Caregiver Contact:n/a  Discharge Caregiver contacted prior to discharge? N/a  Care Conference needed?:  no               Previous HH/SNF/IPR: none      Spoke with patient ,his mother and therapist More Broderick. The plan is for the mother to come and stay with the patient . Per mother states his dad will be coming down also. Spoke with [address] [place] [address] Tylerjunior Nahomi and informed him of the plan and he is fine with that. Patient wants his therapist to get him connected with a psychiatrist to see on outpatient basis. Informed More Broderick, his therapist, and he will connect the patient with a psychiatrist and will let the patient know and let CM know. Mother aware and in agreement. Informed patient's nurse of above. Bee Ang RN BSN CRM        [phone]

## 2023-02-06 NOTE — PROGRESS NOTES
1900-Bedside shift change report given to Екатерина De Los Santos (oncoming nurse) by  Segundo Lewisting nurse). Report included the following information SBAR, Kardex, and MAR.      1952- Patient assessed. See flowsheet. 2120- Patient requests a medication for insomnia. Message sent via [employer] to hospitalist.    2230-Attempted to reach hospitalist after no answer to text, but he is with a patient in an emergency situation and is unable to respond. 2245- Orders received for a one time dose of benadryl. 2247- 25 mg of benadryl given po.    3481- Patient reassessed. No changes. 0400- Reassessed. No changes. Labs drawn and sent. 0700- Report given to the oncoming shift.

## 2023-02-07 VITALS
HEIGHT: 66 IN | TEMPERATURE: 97.3 F | RESPIRATION RATE: 16 BRPM | SYSTOLIC BLOOD PRESSURE: 102 MMHG | BODY MASS INDEX: 22.5 KG/M2 | DIASTOLIC BLOOD PRESSURE: 83 MMHG | OXYGEN SATURATION: 100 % | HEART RATE: 92 BPM | WEIGHT: 140 LBS

## 2023-02-07 LAB
ANION GAP SERPL CALC-SCNC: 3 MMOL/L (ref 5–15)
ATRIAL RATE: 76 BPM
ATRIAL RATE: 79 BPM
ATRIAL RATE: 80 BPM
ATRIAL RATE: 90 BPM
ATRIAL RATE: 95 BPM
B PERT DNA SPEC QL NAA+PROBE: NOT DETECTED
BASOPHILS # BLD: 0 K/UL (ref 0–0.1)
BASOPHILS NFR BLD: 1 % (ref 0–1)
BORDETELLA PARAPERTUSSIS PCR, BORPAR: NOT DETECTED
BUN SERPL-MCNC: 8 MG/DL (ref 6–20)
BUN/CREAT SERPL: 8 (ref 12–20)
C PNEUM DNA SPEC QL NAA+PROBE: NOT DETECTED
CALCIUM SERPL-MCNC: 8.7 MG/DL (ref 8.5–10.1)
CALCULATED P AXIS, ECG09: 70 DEGREES
CALCULATED P AXIS, ECG09: 70 DEGREES
CALCULATED P AXIS, ECG09: 71 DEGREES
CALCULATED P AXIS, ECG09: 78 DEGREES
CALCULATED P AXIS, ECG09: 85 DEGREES
CALCULATED R AXIS, ECG10: 58 DEGREES
CALCULATED R AXIS, ECG10: 61 DEGREES
CALCULATED R AXIS, ECG10: 62 DEGREES
CALCULATED R AXIS, ECG10: 70 DEGREES
CALCULATED R AXIS, ECG10: 81 DEGREES
CALCULATED T AXIS, ECG11: 58 DEGREES
CALCULATED T AXIS, ECG11: 64 DEGREES
CALCULATED T AXIS, ECG11: 67 DEGREES
CALCULATED T AXIS, ECG11: 67 DEGREES
CALCULATED T AXIS, ECG11: 74 DEGREES
CHLORIDE SERPL-SCNC: 108 MMOL/L (ref 97–108)
CO2 SERPL-SCNC: 27 MMOL/L (ref 21–32)
CREAT SERPL-MCNC: 1 MG/DL (ref 0.7–1.3)
DIAGNOSIS, 93000: NORMAL
DIFFERENTIAL METHOD BLD: ABNORMAL
EOSINOPHIL # BLD: 0 K/UL (ref 0–0.4)
EOSINOPHIL NFR BLD: 1 % (ref 0–7)
ERYTHROCYTE [DISTWIDTH] IN BLOOD BY AUTOMATED COUNT: 13.9 % (ref 11.5–14.5)
FLUAV RNA SPEC QL NAA+PROBE: NOT DETECTED
FLUAV SUBTYP SPEC NAA+PROBE: NOT DETECTED
FLUBV RNA SPEC QL NAA+PROBE: NOT DETECTED
FLUBV RNA SPEC QL NAA+PROBE: NOT DETECTED
GLUCOSE SERPL-MCNC: 98 MG/DL (ref 65–100)
HADV DNA SPEC QL NAA+PROBE: NOT DETECTED
HCOV 229E RNA SPEC QL NAA+PROBE: NOT DETECTED
HCOV HKU1 RNA SPEC QL NAA+PROBE: NOT DETECTED
HCOV NL63 RNA SPEC QL NAA+PROBE: NOT DETECTED
HCOV OC43 RNA SPEC QL NAA+PROBE: NOT DETECTED
HCT VFR BLD AUTO: 39.6 % (ref 36.6–50.3)
HGB BLD-MCNC: 13.1 G/DL (ref 12.1–17)
HMPV RNA SPEC QL NAA+PROBE: NOT DETECTED
HPIV1 RNA SPEC QL NAA+PROBE: NOT DETECTED
HPIV2 RNA SPEC QL NAA+PROBE: NOT DETECTED
HPIV3 RNA SPEC QL NAA+PROBE: NOT DETECTED
HPIV4 RNA SPEC QL NAA+PROBE: NOT DETECTED
IMM GRANULOCYTES # BLD AUTO: 0 K/UL (ref 0–0.04)
IMM GRANULOCYTES NFR BLD AUTO: 0 % (ref 0–0.5)
LYMPHOCYTES # BLD: 1.6 K/UL (ref 0.8–3.5)
LYMPHOCYTES NFR BLD: 41 % (ref 12–49)
M PNEUMO DNA SPEC QL NAA+PROBE: NOT DETECTED
MAGNESIUM SERPL-MCNC: 1.9 MG/DL (ref 1.6–2.4)
MCH RBC QN AUTO: 32.5 PG (ref 26–34)
MCHC RBC AUTO-ENTMCNC: 33.1 G/DL (ref 30–36.5)
MCV RBC AUTO: 98.3 FL (ref 80–99)
MONOCYTES # BLD: 0.3 K/UL (ref 0–1)
MONOCYTES NFR BLD: 8 % (ref 5–13)
NEUTS SEG # BLD: 1.9 K/UL (ref 1.8–8)
NEUTS SEG NFR BLD: 49 % (ref 32–75)
NRBC # BLD: 0 K/UL (ref 0–0.01)
NRBC BLD-RTO: 0 PER 100 WBC
P-R INTERVAL, ECG05: 134 MS
P-R INTERVAL, ECG05: 136 MS
P-R INTERVAL, ECG05: 140 MS
P-R INTERVAL, ECG05: 144 MS
P-R INTERVAL, ECG05: 146 MS
PHOSPHATE SERPL-MCNC: 3.7 MG/DL (ref 2.6–4.7)
PLATELET # BLD AUTO: 189 K/UL (ref 150–400)
PMV BLD AUTO: 10.7 FL (ref 8.9–12.9)
POTASSIUM SERPL-SCNC: 3.9 MMOL/L (ref 3.5–5.1)
Q-T INTERVAL, ECG07: 346 MS
Q-T INTERVAL, ECG07: 352 MS
Q-T INTERVAL, ECG07: 366 MS
Q-T INTERVAL, ECG07: 370 MS
Q-T INTERVAL, ECG07: 374 MS
QRS DURATION, ECG06: 88 MS
QRS DURATION, ECG06: 92 MS
QRS DURATION, ECG06: 92 MS
QRS DURATION, ECG06: 94 MS
QRS DURATION, ECG06: 96 MS
QTC CALCULATION (BEZET), ECG08: 416 MS
QTC CALCULATION (BEZET), ECG08: 422 MS
QTC CALCULATION (BEZET), ECG08: 423 MS
QTC CALCULATION (BEZET), ECG08: 428 MS
QTC CALCULATION (BEZET), ECG08: 442 MS
RBC # BLD AUTO: 4.03 M/UL (ref 4.1–5.7)
RSV RNA SPEC QL NAA+PROBE: NOT DETECTED
RV+EV RNA SPEC QL NAA+PROBE: NOT DETECTED
SARS-COV-2 RNA RESP QL NAA+PROBE: NOT DETECTED
SARS-COV-2 RNA RESP QL NAA+PROBE: NOT DETECTED
SODIUM SERPL-SCNC: 138 MMOL/L (ref 136–145)
VENTRICULAR RATE, ECG03: 76 BPM
VENTRICULAR RATE, ECG03: 79 BPM
VENTRICULAR RATE, ECG03: 80 BPM
VENTRICULAR RATE, ECG03: 90 BPM
VENTRICULAR RATE, ECG03: 95 BPM
WBC # BLD AUTO: 3.9 K/UL (ref 4.1–11.1)

## 2023-02-07 PROCEDURE — 74011250637 HC RX REV CODE- 250/637: Performed by: NURSE PRACTITIONER

## 2023-02-07 PROCEDURE — 65270000046 HC RM TELEMETRY

## 2023-02-07 PROCEDURE — 74011250636 HC RX REV CODE- 250/636: Performed by: HOSPITALIST

## 2023-02-07 PROCEDURE — 74011000250 HC RX REV CODE- 250: Performed by: NURSE PRACTITIONER

## 2023-02-07 PROCEDURE — 85025 COMPLETE CBC W/AUTO DIFF WBC: CPT

## 2023-02-07 PROCEDURE — 80048 BASIC METABOLIC PNL TOTAL CA: CPT

## 2023-02-07 PROCEDURE — 0202U NFCT DS 22 TRGT SARS-COV-2: CPT

## 2023-02-07 PROCEDURE — 87636 SARSCOV2 & INF A&B AMP PRB: CPT

## 2023-02-07 PROCEDURE — 74011250636 HC RX REV CODE- 250/636: Performed by: NURSE PRACTITIONER

## 2023-02-07 PROCEDURE — 84100 ASSAY OF PHOSPHORUS: CPT

## 2023-02-07 PROCEDURE — 83735 ASSAY OF MAGNESIUM: CPT

## 2023-02-07 PROCEDURE — 36415 COLL VENOUS BLD VENIPUNCTURE: CPT

## 2023-02-07 RX ADMIN — ENOXAPARIN SODIUM 40 MG: 100 INJECTION SUBCUTANEOUS at 08:35

## 2023-02-07 RX ADMIN — SODIUM CHLORIDE, PRESERVATIVE FREE 10 ML: 5 INJECTION INTRAVENOUS at 21:15

## 2023-02-07 RX ADMIN — SODIUM CHLORIDE, POTASSIUM CHLORIDE, SODIUM LACTATE AND CALCIUM CHLORIDE 75 ML/HR: 600; 310; 30; 20 INJECTION, SOLUTION INTRAVENOUS at 17:14

## 2023-02-07 RX ADMIN — SODIUM CHLORIDE, PRESERVATIVE FREE 10 ML: 5 INJECTION INTRAVENOUS at 06:51

## 2023-02-07 RX ADMIN — SODIUM CHLORIDE, PRESERVATIVE FREE 10 ML: 5 INJECTION INTRAVENOUS at 13:15

## 2023-02-07 RX ADMIN — SODIUM CHLORIDE, POTASSIUM CHLORIDE, SODIUM LACTATE AND CALCIUM CHLORIDE 75 ML/HR: 600; 310; 30; 20 INJECTION, SOLUTION INTRAVENOUS at 03:24

## 2023-02-07 RX ADMIN — ELVITEGRAVIR, COBICISTAT, EMTRICITABINE, AND TENOFOVIR ALAFENAMIDE 1 TABLET: 150; 150; 200; 10 TABLET ORAL at 08:35

## 2023-02-07 NOTE — PROGRESS NOTES
Transition of Care Plan:     RUR:10% \"low risk\"  Disposition:home  If SNF or IPR: Date FOC offered:n/a  Date FOC received:n/a  Date authorization started with reference number:n/a  Date authorization received and expires:n/a  Accepting facility:n/a  Follow up appointments:to be scheduled   DME needed:none  Transportation at [facility] to be set up  [address] or means to access home:  yes       IM Medicare Letter:n/a, patient is un-insured   Is patient a  and connected with the [place]?   no             If yes, was [place] transfer form completed and VA notified? N/a  Caregiver Contact:n/a  Discharge Caregiver contacted prior to discharge? N/a  Care Conference needed?: Not at this time     Update 3pm: CM contacted [facility] to flow up on disposition. Gagandeep Jernigan the crisis worker reported that Dara Kaur is recommending a TDO. Gagandeep Jernigan working on the TDO and pre-screen. Assigned RN aware. [facility] to work on bed placement. Initial note: Chart reviewed for updates. NP psychiatrist is requesting for [facility] to screen patient for a TDO. CM contacted [facility] [phone] and requested for a pre-screen. Pt was admitted due to an overdose and admitted it was a suicide attempt. [facility] requesting for CM to send referral to [phone]. CM has sent patient's medical notes as requested by [facility]. A clinician from [facility] will come assess pt. CM will continue to follow up.     [facility] [place]    [phone]

## 2023-02-07 NOTE — BSMART NOTE
BSMART Liaison Team Note     LOS:  3 Days     Patient goal(s) for today: Take medication as prescribed  BSMART Liaison team focus/goals: Assess needs, provide education and support    Progress note: Liaison met w/ pt face to face w/ 1:1 sitter and mother, Kathleen Escamilla present at bedside. Pt consented for his mother to be present during this meeting after liaison explained her role. Pt presents as highly anxious and distressed w/ loud speech at times. Pt explains he does not feel that he would benefit from psychiatric inpatient treatment commenting \"Technically I don't feel like I need to go. .. 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 Pt asserts he has been unfairly evaluated because he believes the decision by Briana Butler is based off an evaluation by a nurse that was completed on Sunday while he was \"sedated\". Liaison provided education regarding the TDO/prescreening process along w/ the safety concerns regarding the severity of pt's recent overdose attempt - pt verbalized understanding, but continues to voice his frustrations regarding not receiving any clarification from his attending provider, Dr. Mak Zuniga and states [id number] that he was going to be discharged home today. Liaison provided validation and supportive listening. Pt made several comments during this meeting that he has connections to [employer] and can easily contact them regarding this situation. Pt is requesting for pt advocacy at this time and pt's primary nurse, Delbert Reyes RN is aware. Liaison notified [facility] Team Christiano Erwin regarding this situation. Liaison attempted to contact [facility] several times to follow-up, but was unsuccessful d/t receiving a busy signal - unable to leave a voicemail.       Barriers to Discharge: TDO Placement    Outpatient provider(s):  Therapist, Rashard Garcia [facility] info/prescription coverage:  Not on file    Diagnosis: Per Christopher Keen, PMHNP consult note on 2/6, \"Eric Urbina III meets criteria for a diagnosis of recurrent major depressive disorder, severe and general anxiety disorder\". Plan:  Liaison was informed by pt's primary nurse, Carlos Ramirez RN that [employer] is recommending a TDO at this time and will be conducting a statewide bed search for placement. Please defer to the psychiatric NP consult note for recommendations. Follow up Psych Consult placed? Yes  Psychiatrist updated?  No      Participating treatment team members: Page Gallego LMSW, Mother, [facility]

## 2023-02-07 NOTE — PROGRESS NOTES
1926: Bedside and Verbal shift change report given to laura (oncoming nurse) by Thien Anand RN  (offgoing nurse). Report included the following information SBAR, Kardex, Intake/Output, Recent Results, and Cardiac Rhythm nsr . End of Shift Note    Bedside shift change report given to meaghan (oncoming nurse) by Chase Bond (offgoing nurse).   Report included the following information SBAR    Shift worked: night     Shift summary and any significant changes:     No thoughts of harming self or others, sitter at bedside, VSS     Concerns for physician to address:  D/c     Zone phone for oncoming shift:              Chase Bond

## 2023-02-07 NOTE — PROGRESS NOTES
1104 - PCP hospital follow-up transitional care appointment has been scheduled with Dr. Jun Britt. Staci Mohs on 2/13/23 at [phone]. Pending patient discharge. Masha Bennett, Care Management Assistant     Attempted to schedule hospital follow up for PCP appointment. Sent a message to PCP office to find patient an appointment. Awaiting callback from PCP office.  Suha Radford

## 2023-02-07 NOTE — PROGRESS NOTES
0700: Bedside shift change report given to Shanell Frankel RN (oncoming nurse) by Kat Cabrera RN (offgoing nurse). Report included the following information SBAR, Kardex, Intake/Output, and Recent Results. 1130: Spoke with Dr. Beauford Lefort. Patient is medically ready for D/C, but last note from psych suggests that patient needs inpatient psych, but is refusing. Patient will need an official assessment by [facility] to see if he is appropriate for TDO. If [employer] does not think patient is appropriate for TDO, will need note from psych to clear him to go home. CM will work on getting on touch with [employer]. 1225: [place] [facility] having zoom meeting with patient at this time. 1430: Received call from Psych and they stated that [employer] had called them stating they will need to TDO patient. Patient will need Resp panel with PCR. Dr. Beauford Lefort updated. 1600: Patient requesting to speak to Dr. Beauford Lefort at bedside. Dr. Melendez Comment at this time to see if he can come to bedside. Also, patient requesting to speak to Patient advocate. Patient advocate called and VM left at this time. Will give patient the number as well.     6612: Psych at bedside discussing plan of care with patient. 1900: End of Shift Note    Bedside shift change report given to harvinder RN (oncoming nurse) by Anjali Lenz (offgoing nurse). Report included the following information SBAR, Kardex, Intake/Output, MAR, and Recent Results    Shift worked:  4030-0445     Shift summary and any significant changes:     Saniya grissom wants to TDO patient. Resp panel sen to lab, awaiting placement. Concerns for physician to address:  none     Zone phone for oncoming shift:   XXX       Activity:  Activity Level:  Up with Assistance  Number times ambulated in hallways past shift: 0  Number of times OOB to chair past shift: 3    Cardiac:   Cardiac Monitoring: Yes      Cardiac Rhythm: Sinus Rhythm    Access:  Current line(s): PIV Genitourinary:   Urinary status: voiding    Respiratory:   O2 Device: None (Room air)  Chronic home O2 use?: NO  Incentive spirometer at bedside: N/A       GI:  Last Bowel Movement Date: 02/05/23  Current diet:  ADULT DIET Regular; Safety Tray; Safety Tray (Disposables)  Passing flatus: YES  Tolerating current diet: YES       Pain Management:   Patient states pain is manageable on current regimen: YES    Skin:  Merrill Score: 21  Interventions: float heels and increase time out of bed    Patient Safety:  Fall Score:  Total Score: 1  Interventions: bed/chair alarm, gripper socks, and sitter at bedside        Length of Stay:  Expected LOS: 3d 14h  Actual LOS: 1 [facility] Opt out

## 2023-02-07 NOTE — PROGRESS NOTES
Problem: Pressure Injury - Risk of  Goal: *Prevention of pressure injury  Description: Document Merrill Scale and appropriate interventions in the flowsheet. Outcome: Progressing Towards Goal  Note: Pressure Injury Interventions:  Sensory Interventions: Assess changes in LOC         Activity Interventions: Increase time out of bed    Mobility Interventions: Assess need for specialty bed    Nutrition Interventions: Document food/fluid/supplement intake    Friction and Shear Interventions: HOB 30 degrees or less                Problem: Patient Education: Go to Patient Education Activity  Goal: Patient/Family Education  Outcome: Progressing Towards Goal     Problem: Falls - Risk of  Goal: *Absence of Falls  Description: Document Mahesh Fall Risk and appropriate interventions in the flowsheet.   Outcome: Progressing Towards Goal  Note: Fall Risk Interventions:                 Elimination Interventions: Bed/chair exit alarm              Problem: Patient Education: Go to Patient Education Activity  Goal: Patient/Family Education  Outcome: Progressing Towards Goal     Problem: Suicide  Goal: *STG: Remains safe in hospital  Outcome: Progressing Towards Goal  Goal: *STG: Seeks staff when feelings of self harm or harm towards others arise  Outcome: Progressing Towards Goal  Goal: *STG: Attends activities and groups  Outcome: Progressing Towards Goal  Goal: *STG:  Verbalizes alternative ways of dealing with maladaptive feelings/behaviors  Outcome: Progressing Towards Goal  Goal: *STG/LTG: Complies with medication therapy  Outcome: Progressing Towards Goal  Goal: *STG/LTG: No longer expresses self destructive or suicidal thoughts  Outcome: Progressing Towards Goal  Goal: *LTG:  Identifies available community resources  Outcome: Progressing Towards Goal  Goal: *LTG:  Develops proactive suicide prevention plan  Outcome: Progressing Towards Goal  Goal: Interventions  Outcome: Progressing Towards Goal     Problem: Patient Education: Go to Patient Education Activity  Goal: Patient/Family Education  Outcome: Progressing Towards Goal     Problem: Risk for Elopement  Goal: Patient will not exit the unit/facility without proper escort  Outcome: Progressing Towards Goal

## 2023-02-07 NOTE — PROGRESS NOTES
Patient transferred to room [phone]. Handoff given to Melisa Marin CM to follow for dcp. Bee Ang  RN BSN CRM        [phone]

## 2023-02-08 ENCOUNTER — HOSPITAL ENCOUNTER (INPATIENT)
Age: 33
LOS: 1 days | Discharge: HOME | DRG: 885 | End: 2023-02-09
Attending: PSYCHIATRY & NEUROLOGY | Admitting: PSYCHIATRY & NEUROLOGY
Payer: COMMERCIAL

## 2023-02-08 DIAGNOSIS — F33.1 MODERATE EPISODE OF RECURRENT MAJOR DEPRESSIVE DISORDER (HCC): ICD-10-CM

## 2023-02-08 DIAGNOSIS — Z21 ASYMPTOMATIC HIV INFECTION, WITH NO HISTORY OF HIV-RELATED ILLNESS (HCC): Primary | ICD-10-CM

## 2023-02-08 PROBLEM — T50.902A SUICIDE ATTEMPT BY DRUG INGESTION (HCC): Status: RESOLVED | Noted: 2023-02-04 | Resolved: 2023-02-08

## 2023-02-08 PROCEDURE — 74011250637 HC RX REV CODE- 250/637: Performed by: PSYCHIATRY & NEUROLOGY

## 2023-02-08 PROCEDURE — 65220000003 HC RM SEMIPRIVATE PSYCH

## 2023-02-08 PROCEDURE — 99223 1ST HOSP IP/OBS HIGH 75: CPT | Performed by: PSYCHIATRY & NEUROLOGY

## 2023-02-08 RX ORDER — OLANZAPINE 5 MG/1
5 TABLET ORAL
Status: DISCONTINUED | OUTPATIENT
Start: 2023-02-08 | End: 2023-02-09 | Stop reason: HOSPADM

## 2023-02-08 RX ORDER — ADHESIVE BANDAGE
30 BANDAGE TOPICAL DAILY PRN
Status: DISCONTINUED | OUTPATIENT
Start: 2023-02-08 | End: 2023-02-09 | Stop reason: HOSPADM

## 2023-02-08 RX ORDER — BENZTROPINE MESYLATE 1 MG/1
1 TABLET ORAL
Status: DISCONTINUED | OUTPATIENT
Start: 2023-02-08 | End: 2023-02-09 | Stop reason: HOSPADM

## 2023-02-08 RX ORDER — HYDROXYZINE 25 MG/1
50 TABLET, FILM COATED ORAL
Status: DISCONTINUED | OUTPATIENT
Start: 2023-02-08 | End: 2023-02-09 | Stop reason: HOSPADM

## 2023-02-08 RX ORDER — BUPROPION HYDROCHLORIDE 150 MG/1
150 TABLET ORAL
Status: DISCONTINUED | OUTPATIENT
Start: 2023-02-08 | End: 2023-02-09 | Stop reason: HOSPADM

## 2023-02-08 RX ORDER — LORAZEPAM 2 MG/ML
1 INJECTION INTRAMUSCULAR
Status: DISCONTINUED | OUTPATIENT
Start: 2023-02-08 | End: 2023-02-09 | Stop reason: HOSPADM

## 2023-02-08 RX ORDER — TRAZODONE HYDROCHLORIDE 50 MG/1
50 TABLET ORAL
Status: DISCONTINUED | OUTPATIENT
Start: 2023-02-08 | End: 2023-02-09 | Stop reason: HOSPADM

## 2023-02-08 RX ORDER — ACETAMINOPHEN 325 MG/1
650 TABLET ORAL
Status: DISCONTINUED | OUTPATIENT
Start: 2023-02-08 | End: 2023-02-09 | Stop reason: HOSPADM

## 2023-02-08 RX ORDER — HALOPERIDOL 5 MG/ML
5 INJECTION INTRAMUSCULAR
Status: DISCONTINUED | OUTPATIENT
Start: 2023-02-08 | End: 2023-02-09 | Stop reason: HOSPADM

## 2023-02-08 RX ORDER — DIPHENHYDRAMINE HYDROCHLORIDE 50 MG/ML
50 INJECTION, SOLUTION INTRAMUSCULAR; INTRAVENOUS
Status: DISCONTINUED | OUTPATIENT
Start: 2023-02-08 | End: 2023-02-09 | Stop reason: HOSPADM

## 2023-02-08 RX ADMIN — BUPROPION HYDROCHLORIDE 150 MG: 150 TABLET, EXTENDED RELEASE ORAL at 11:56

## 2023-02-08 RX ADMIN — ELVITEGRAVIR, COBICISTAT, EMTRICITABINE, AND TENOFOVIR ALAFENAMIDE 1 TABLET: 150; 150; 200; 10 TABLET ORAL at 11:56

## 2023-02-08 NOTE — GROUP NOTE
[facility]  GROUP DOCUMENTATION INDIVIDUAL                                                                          Group Therapy Note    Date: 2/8/2023    Group Start Time: 1500  Group End Time: 1600  Group Topic: Recreational/Music Therapy    [facility] [place] [facility] [address] ACUTE BEHAV [facility]    [place], [address] [facility] GROUP DOCUMENTATION GROUP    Group Therapy Note    Attendees: 11       Attendance: Attended    Patient's Goal:  To concentrate on selected task    Interventions/techniques: Supported-crafts,games,music    Follows Directions:  Followed directions    Interactions: Interacted appropriately    Mental Status: Calm    Behavior/appearance: Attentive and Cooperative    Goals Achieved: Able to engage in interactions and Able to listen to others-active participant      [place] [employer]

## 2023-02-08 NOTE — PROGRESS NOTES
GROUP THERAPY PROGRESS NOTE      Loraine Barahona III was present for medication group. GROUP TIME: 45 minutes, Wednesdays 2pm    PERSONAL GOAL FOR PARTICIPATION: To be present for group, participate in discussion, and answer patient-directed questions. GOAL ORIENTATION: Personal    THERAPEUTIC INTERVENTIONS REVIEWED AND DISCUSSED: The following topics were presented: storage of medications, how to remember to refill medications and keep up with doctor appointments, relapse prevention, keeping a record of all medication including prescription and non-prescription drugs, and who to contact with medication questions. Patients were given time to ask questions regarding their current therapy. IMPRESSION OF PARTICIPATION: Luis Doran was an active participant in group discussions. He was the winner of medication [employer] and gave his prize away to another peer. He requested and received a pillbox at the conclusion of group.        Wilner Cabrera, PHARMD, BCPS, [facility]  Clinical Pharmacy Specialist, [address] [facility]  Desk: [phone] ([phone]  Pharmacy: [phone] ([phone])

## 2023-02-08 NOTE — GROUP NOTE
[facility]  GROUP DOCUMENTATION INDIVIDUAL                                                                          Group Therapy Note    Date: 2/8/2023    Group Start Time: 0900  Group End Time: 1000  Group Topic: Recreational/Music Therapy    [address] [facility] [address] ACUTE BEHAV [facility]    [place], [address] [facility] GROUP DOCUMENTATION GROUP    Group Therapy Note    Attendees: 11       Attendance: Attended    Patient's Goal:  To learn about resilience    Interventions/techniques: Supported-handout,discussion    Follows Directions:  Followed directions    Interactions: Interacted appropriately    Mental Status: Calm    Behavior/appearance: Attentive and Cooperative    Goals Achieved: Able to engage in interactions, Able to listen to others, Able to reflect/comment on own behavior, Able to self-disclose, Discussed coping, and Identified feelings      Lana Councilman

## 2023-02-08 NOTE — PROGRESS NOTES
Hospitalist Progress Note    NAME: David Akers III   :  1990   MRN:  [id number]       Assessment / Plan:  Suicide attempt by Drug overdose  Acute metabolic encephalopathy due to TCA overdose + ativan  Acetaminophen, salicylate, and alcohol level all negative  IVF  Seizure precautions  Psychiatry consulted need to be transfer to inpatient psych   Patient medically stable   Psych recommend TDO   [place] crisis consulted       HIV  Cont home HIV meds. Hypokalemia  Replete prn      Estimated discharge date:    Medically stable need inpatient psych   Case was discussed with the patient and mother     Code status: full  DVT prophylaxis: Lovenox  Patient medically stable need transfer to Psych        Subjective:     Chief Complaint / Reason for Physician Visit  Pt NAD. Discussed with RN events overnight. Review of Systems:  Symptom Y/N Comments  Symptom Y/N Comments   Fever/Chills    Chest Pain     Poor Appetite    Edema     Cough    Abdominal Pain     Sputum    Joint Pain     SOB/CHAUDHARY    Pruritis/Rash     Nausea/vomit    Tolerating PT/OT     Diarrhea    Tolerating Diet     Constipation    Other       Could NOT obtain due to:      Objective:     VITALS:   Last 24hrs VS reviewed since prior progress note.  Most recent are:  Patient Vitals for the past 24 hrs:   Temp Pulse Resp BP SpO2   23 1929 98 °F (36.7 °C) 92 27 117/79 100 %   23 1557 -- 100 -- -- --   23 1445 98.1 °F (36.7 °C) 90 17 111/76 98 %   23 1156 -- 86 -- -- --   23 1050 98 °F (36.7 °C) 93 18 105/79 97 %   23 0745 -- 85 -- -- --   23 0723 97.7 °F (36.5 °C) 85 15 117/80 98 %   23 0400 -- 85 -- -- --   23 0332 98 °F (36.7 °C) 78 15 105/71 --   23 0000 -- 85 -- -- --         Intake/Output Summary (Last 24 hours) at 2023 3451  Last data filed at 2023 1735  Gross per 24 hour   Intake 2040 ml   Output --   Net 2040 ml          I had a face to face encounter and independently examined this patient on 2/7/2023, as outlined below:  PHYSICAL EXAM:  General: WD, WN. Alert, cooperative, no acute distress    EENT:  EOMI. Anicteric sclerae. MMM  Resp:  CTA bilaterally, no wheezing or rales. No accessory muscle use  CV:  Regular  rhythm,  No edema  GI:  Soft, Non distended, Non tender. +Bowel sounds  Neurologic:  Alert and oriented X 3, normal speech  Psych:   Good insight. Not anxious nor agitated  Skin:  No rashes. No jaundice    Reviewed most current lab test results and cultures  YES  Reviewed most current radiology test results   YES  Review and summation of old records today    NO  Reviewed patient's current orders and MAR    YES  PMH/SH reviewed - no change compared to H&P  ________________________________________________________________________  Care Plan discussed with:    Comments   Patient x    Family      RN x    Care Manager     Consultant                        Multidiciplinary team rounds were held today with , nursing, pharmacist and clinical coordinator. Patient's plan of care was discussed; medications were reviewed and discharge planning was addressed. ________________________________________________________________________  Total NON critical care TIME:  35   Minutes    Total CRITICAL CARE TIME Spent:   Minutes non procedure based      Comments   >50% of visit spent in counseling and coordination of care     ________________________________________________________________________  Aruna Cardenas MD     Procedures: see electronic medical records for all procedures/Xrays and details which were not copied into this note but were reviewed prior to creation of Plan. LABS:  I reviewed today's most current labs and imaging studies.   Pertinent labs include:  Recent Labs     02/07/23 0338 02/06/23  0404 02/05/23  0221   WBC 3.9* 4.8 4.7   HGB 13.1 12.8 13.1   HCT 39.6 37.9 37.6    186 184       Recent Labs     02/07/23 0338 02/06/23  0404 02/05/23  0221    141 140   K 3.9 3.7 4.1    110* 111*   CO2 27 26 26   GLU 98 98 89   BUN 8 10 6   CREA 1.00 0.86 0.71   CA 8.7 8.8 8.2*   MG 1.9 2.1 2.3   PHOS 3.7 3.3 2.8   ALB  --   --  3.6   TBILI  --   --  0.5   ALT  --   --  16         Signed:  Nieves Wilson MD

## 2023-02-08 NOTE — H&P
INITIAL PSYCHIATRIC EVALUATION            IDENTIFICATION:    Patient Name  Raine Santoyo III   Date of Birth 1990   [facility] [id number]   Medical Record Number  [id number]      Age  34 y.o. PCP Nawaf Farley DO   Admit date:  2/8/2023    Room Number  865/68  @ [facility]   Date of Service  2/8/2023            HISTORY         REASON FOR HOSPITALIZATION:  CC: \"suicide attempt\". Pt admitted under a temporary long-term order (TDO) for suicidal ideations proving to be an imminent danger to self and an inability to care for self. HISTORY OF PRESENT ILLNESS:    The patient, Nia Escobar, is a 34 y.o. BLACK/ male with a past psychiatric history significant for major depressive disorder and cannabis use disorder, who presents at this time with complaints of (and/or evidence of) the following emotional symptoms: depression and suicidal thoughts/threats. Additional symptomatology include mood lability. The above symptoms have been present for 2+ weeks. These symptoms are of moderate to high severity. These symptoms are constant in nature. The patient's condition has been precipitated by psychosocial stressors. Patient's condition made worse by continued illicit drug use as well as treatment noncompliance. UDS: +THC; BAL=0. The patient reports feeling desperate due to relationship stressors which led to an intentional overdose on Doxepin. Patient has been visible, pleasant and cooperative. The patient is a good historian. The patient corroborates the above narrative. The patient contracts for safety on the unit and gives consent for the team to contact collateral. The patient is amenable to initiating treatment while on the unit.      ALLERGIES: No Known Allergies   MEDICATIONS PRIOR TO ADMISSION:   Medications Prior to Admission   Medication Sig    elvitegravir-cobicistat-emtricitabine-tenofovir alafenamide (Genvoya) tab tablet TAKE 1 TABLET BY MOUTH EVERY DAY WITH BREAKFAST    doxepin (SINEquan) 25 mg capsule Take 1 Capsule by mouth nightly. PAST MEDICAL HISTORY:   Past Medical History:   Diagnosis Date    Anxiety     Asthma     Depression     Depression 8/22/2016    HIV (human immunodeficiency virus infection) ([facility] 12/3/2013     Past Surgical History:   Procedure Laterality Date    HX WISDOM TEETH EXTRACTION        SOCIAL HISTORY:  The patient is currently employed; the patient is a smoker, and smokes up to 1/4 ppd; the patient's marital status is single; the patient does not have children the patient reports the highest level of education achieved is high school. He lives alone. FAMILY HISTORY: History reviewed, pertinent family history as below:   Family History   Problem Relation Age of Onset    Hypertension Mother     Diabetes Mother     Depression Mother     Anxiety Mother     Depression Father     Hypertension Father        REVIEW OF SYSTEMS:   Pertinent items are noted in the History of Present Illness. All other Systems reviewed and are considered negative. MENTAL STATUS EXAM & VITALS     MENTAL STATUS EXAM (MSE):    MSE FINDINGS ARE WITHIN NORMAL LIMITS (WNL) UNLESS OTHERWISE STATED BELOW. ( ALL OF THE BELOW CATEGORIES OF THE MSE HAVE BEEN REVIEWED (reviewed 2/8/2023) AND UPDATED AS DEEMED APPROPRIATE )  General Presentation age appropriate, cooperative   Orientation oriented to time, place and person   Vital Signs  See below (reviewed 2/8/2023); Vital Signs (BP, Pulse, & Temp) are within normal limits if not listed below.    Gait and Station Stable/steady, no ataxia   Musculoskeletal System No extrapyramidal symptoms (EPS); no abnormal muscular movements or Tardive Dyskinesia (TD); muscle strength and tone are within normal limits   Language No aphasia or dysarthria   Speech:  normal volume and non-pressured   Thought Processes logical; normal rate of thoughts; fair abstract reasoning/computation   Thought Associations goal directed Thought Content not internally preoccupied   Suicidal Ideations none   Homicidal Ideations none   Mood:  depressed   Affect:  euthymic   Memory recent  intact   Memory remote:  intact   Concentration/Attention:  intact   Fund of Knowledge average   Insight:  limited   Reliability fair   Judgment:  good          VITALS:     Patient Vitals for the past 24 hrs:   Temp Pulse Resp BP SpO2   02/08/23 0815 98.1 °F (36.7 °C) 99 14 115/65 100 %   02/08/23 0020 97.5 °F (36.4 °C) 85 16 108/86 100 %     Wt Readings from Last 3 Encounters:   02/04/23 63.5 kg (140 lb)   03/23/21 64.2 kg (141 lb 9.6 oz)   12/10/19 64.3 kg (141 lb 12.8 oz)     Temp Readings from Last 3 Encounters:   02/08/23 98.1 °F (36.7 °C)   02/07/23 97.3 °F (36.3 °C)   03/23/21 98 °F (36.7 °C) (Temporal)     BP Readings from Last 3 Encounters:   02/08/23 115/65   02/07/23 102/83   03/23/21 106/68     Pulse Readings from Last 3 Encounters:   02/08/23 99   02/07/23 92   03/23/21 85            DATA     LABORATORY DATA:  Labs Reviewed - No data to display  No results displayed because visit has over 200 results. RADIOLOGY REPORTS:  Results from Hospital Encounter encounter on 04/15/19    XR CHEST PA LAT    Narrative  Indication: Upper chest pain for 2 weeks. Exam: PA and lateral views of the chest.    Direct comparison is made to prior CXR dated October 2015. Findings: Cardiomediastinal silhouette is within normal limits. Lungs are clear  bilaterally. Pleural spaces are normal. Osseous structures are intact. Impression  IMPRESSION: No acute cardiopulmonary disease. No results found.            MEDICATIONS       ALL MEDICATIONS  Current Facility-Administered Medications   Medication Dose Route Frequency    OLANZapine (ZyPREXA) tablet 5 mg  5 mg Oral Q6H PRN    haloperidol lactate (HALDOL) injection 5 mg  5 mg IntraMUSCular Q6H PRN    benztropine (COGENTIN) tablet 1 mg  1 mg Oral BID PRN    diphenhydrAMINE (BENADRYL) injection 50 mg  50 mg IntraMUSCular BID PRN    hydrOXYzine HCL (ATARAX) tablet 50 mg  50 mg Oral TID PRN    LORazepam (ATIVAN) injection 1 mg  1 mg IntraMUSCular Q4H PRN    traZODone (DESYREL) tablet 50 mg  50 mg Oral QHS PRN    acetaminophen (TYLENOL) tablet 650 mg  650 mg Oral Q4H PRN    magnesium hydroxide (MILK OF MAGNESIA) 400 mg/5 mL oral suspension 30 mL  30 mL Oral DAILY PRN    elvitegravir-cobicistat-emtricitabine-tenofovir alafenamide (GENVOYA) [phone] mg tablet 1 Tablet  1 Tablet Oral DAILY WITH BREAKFAST    buPROPion XL (WELLBUTRIN XL) tablet 150 mg  150 mg Oral 7am      SCHEDULED MEDICATIONS  Current Facility-Administered Medications   Medication Dose Route Frequency    elvitegravir-cobicistat-emtricitabine-tenofovir alafenamide (GENVOYA) [phone] mg tablet 1 Tablet  1 Tablet Oral DAILY WITH BREAKFAST    buPROPion XL (WELLBUTRIN XL) tablet 150 mg  150 mg Oral 7am                ASSESSMENT & PLAN        The patient, Ashely Alfred, is a 34 y.o.  male who presents at this time for treatment of the following diagnoses:  Patient Active Hospital Problem List:       Moderate episode of recurrent major depressive disorder ([facility] (2/8/2023)           Assessment: the patient presents s/o suicide attempt by overdose in the setting of relationship stressors, high THC use. Will start atypical AD (h/o nausea on SSRIs), refer to therapist.        Plan:   - Observe off substances  - START Wellbutrin  mg QDAY for MDD  - IGM therapy as tolerated  - Expand database / obtain collateral  - Dispo planning (home when stable)         A coordinated, multidisplinary treatment team (includes the nurse, unit pharmacist,  and writer) round was conducted for this initial evaluation with the patient present. The following regarding medications was addressed during rounds with patient: the risks and benefits of the proposed medication. The patient was given the opportunity to ask questions.  Informed consent given to the use of the above medications. I will continue to adjust psychiatric and non-psychiatric medications (see above \"medication\" section and orders section for details) as deemed appropriate & based upon diagnoses and response to treatment. I have reviewed admission (and previous/old) labs and medical tests in the EHR and or transferring hospital documents. I will continue to order blood tests/labs and diagnostic tests as deemed appropriate and review results as they become available (see orders for details). I have reviewed old psychiatric and medical records available in the EHR. I Will order additional psychiatric records from other institutions to further elucidate the nature of patient's psychopathology and review once available. I will gather additional collateral information from friends, family and o/p treatment team to further elucidate the nature of patient's psychopathology and baselline level of psychiatric functioning. I certify that this patient's inpatient psychiatric hospital services are required for treatment that could reasonably be expected to improve the patient's condition, or for diagnostic study, and that the patient continues to need, on a daily basis, active treatment furnished directly by or requiring the supervision of inpatient psychiatric facility personnel. In addition, the hospital records show that services furnished were intensive treatment services, admission or related services, or equivalent services.       ESTIMATED LENGTH OF STAY:  2-3 days       STRENGTHS:  Exercising self-direction/Resourceful, Access to housing/residential stability, and Interpersonal/supportive relationships (family, friends, peers)                                        SIGNED:    Masood Lepe MD  2/8/2023

## 2023-02-08 NOTE — PROGRESS NOTES
Problem: Pressure Injury - Risk of  Goal: *Prevention of pressure injury  Description: Document Merrill Scale and appropriate interventions in the flowsheet. Outcome: Progressing Towards Goal  Note: Pressure Injury Interventions:  Sensory Interventions: Assess changes in LOC         Activity Interventions: Increase time out of bed    Mobility Interventions: Turn and reposition approx. every two hours(pillow and wedges)    Nutrition Interventions: Document food/fluid/supplement intake    Friction and Shear Interventions: Apply protective barrier, creams and emollients                Problem: Patient Education: Go to Patient Education Activity  Goal: Patient/Family Education  Outcome: Progressing Towards Goal     Problem: Falls - Risk of  Goal: *Absence of Falls  Description: Document Mahesh Fall Risk and appropriate interventions in the flowsheet.   Outcome: Progressing Towards Goal  Note: Fall Risk Interventions:                 Elimination Interventions: Bed/chair exit alarm              Problem: Patient Education: Go to Patient Education Activity  Goal: Patient/Family Education  Outcome: Progressing Towards Goal     Problem: Suicide  Goal: *STG: Remains safe in hospital  Outcome: Progressing Towards Goal  Goal: *STG: Seeks staff when feelings of self harm or harm towards others arise  Outcome: Progressing Towards Goal  Goal: *STG: Attends activities and groups  Outcome: Progressing Towards Goal  Goal: *STG:  Verbalizes alternative ways of dealing with maladaptive feelings/behaviors  Outcome: Progressing Towards Goal  Goal: *STG/LTG: Complies with medication therapy  Outcome: Progressing Towards Goal  Goal: *STG/LTG: No longer expresses self destructive or suicidal thoughts  Outcome: Progressing Towards Goal  Goal: *LTG:  Identifies available community resources  Outcome: Progressing Towards Goal  Goal: *LTG:  Develops proactive suicide prevention plan  Outcome: Progressing Towards Goal  Goal: Interventions  Outcome: Progressing Towards Goal     Problem: Patient Education: Go to Patient Education Activity  Goal: Patient/Family Education  Outcome: Progressing Towards Goal     Problem: Risk for Elopement  Goal: Patient will not exit the unit/facility without proper escort  Outcome: Progressing Towards Goal

## 2023-02-08 NOTE — PROGRESS NOTES
Problem: Suicide  Goal: *STG: Remains safe in hospital  Outcome: Progressing Towards Goal    Shift change report given to Janis MAGAÑA (oncoming nurse) by Benito Sharpe (offgoing nurse). Report included the following information SBAR, Kardex, MAR, and Recent Results. Assumed care of patient. Met patient in dayroom coloring. Patient calm and cooperative with assessment. Patient smiling. Patient denied anxiety, depression, SI, HI and AVH. No complaints of pain. Medication and meal compliant. Patient visible on unit. Patient in dayroom for most of shift for groups and to do arts and crafts. Patient pleasant throughout shift. No issues noted.

## 2023-02-08 NOTE — PROGRESS NOTES
1929: Bedside and Verbal shift change report given to laura (oncoming nurse) by Reyes Friday  (offgoing nurse). Report included the following information SBAR, Kardex, MAR, Recent Results, and Cardiac Rhythm nsr .         1955: [facility] rep Ma Finder called informed this RN pt needs rapid covid swab + influenza A&B prior to placement @ [facility]    2040: test above ordered      2142: resp panel w/ covid 19 pcr came back, negative for all tests, [facility] rep called by this RN to make aware    2200: [facility] Elli Esquivel called back to give this RN the information as follows: room and bed #,321B unit #  [phone], and acccepting physician @ [facility]. This RN requested TDO forms be faxed over to [facility] officers to transport pt to [facility]    2215: tdo paperwork faxed over     2244: TRANSFER - OUT REPORT:    Verbal report given to Jesus Yin, RN (name) on Nandini Brown III  being transferred to [facility] (unit) for routine progression of care   PT EMTALA to inpatient psych     Report consisted of patients Situation, Background, Assessment and   Recommendations(SBAR). Information from the following report(s) SBAR, Kardex, MAR, Recent Results, and Cardiac Rhythm NSR  was reviewed with the receiving nurse. Lines:       Opportunity for questions and clarification was provided. Patient transported with:   [place] [employer] took responsibility of patient belongings as well                        [phone]: VS taken and are stable, [place] officers arrived.  Emtala, facesheet, H&P, MAR, last MD note, recent labs all printed out  5047: emtala  form signed by all responsible parties

## 2023-02-08 NOTE — PROGRESS NOTES
Problem: Pressure Injury - Risk of  Goal: *Prevention of pressure injury  Description: Document Merrill Scale and appropriate interventions in the flowsheet. Outcome: Resolved/Met     Problem: Patient Education: Go to Patient Education Activity  Goal: Patient/Family Education  Outcome: Resolved/Met     Problem: Falls - Risk of  Goal: *Absence of Falls  Description: Document Mahesh Fall Risk and appropriate interventions in the flowsheet.   Outcome: Resolved/Met     Problem: Patient Education: Go to Patient Education Activity  Goal: Patient/Family Education  Outcome: Resolved/Met     Problem: Suicide  Goal: *STG: Remains safe in hospital  Outcome: Resolved/Met  Goal: *STG: Seeks staff when feelings of self harm or harm towards others arise  Outcome: Resolved/Met  Goal: *STG: Attends activities and groups  Outcome: Resolved/Met  Goal: *STG:  Verbalizes alternative ways of dealing with maladaptive feelings/behaviors  Outcome: Resolved/Met  Goal: *STG/LTG: Complies with medication therapy  Outcome: Resolved/Met  Goal: *STG/LTG: No longer expresses self destructive or suicidal thoughts  Outcome: Resolved/Met  Goal: *LTG:  Identifies available community resources  Outcome: Resolved/Met  Goal: *LTG:  Develops proactive suicide prevention plan  Outcome: Resolved/Met  Goal: Interventions  Outcome: Resolved/Met     Problem: Patient Education: Go to Patient Education Activity  Goal: Patient/Family Education  Outcome: Resolved/Met     Problem: Risk for Elopement  Goal: Patient will not exit the unit/facility without proper escort  Outcome: Resolved/Met

## 2023-02-08 NOTE — BH NOTES
PSYCHOSOCIAL ASSESSMENT  :Patient identifying info:   Lizzette Matos is a 34 y.o., male admitted 2/8/2023 11:49 PM     Presenting problem and precipitating factors: Patient presented to ED [facility] ER after making an intentional overdose on 2/4/23 (20 tabs of Doxepin). Patient endorses depression and SI due to being \"triggered by his ex\". Patient has had two prior attempts by overdose on medications (2004 & 2011). Patient reports he was not having any SI \"prior to [place]" and that it was only triggered by an interaction with his ex. Patient reports that he has been broken up with his ex boyfriend for a while, and that a friend of his ex reached out to him. Patient reports that the friend of his ex sent the conversation between him and the patient to his ex and their friend group. Patient reports that this was triggering and embarrassing, and that it reminded him of when he used to be bullied in school, triggering his SI. Patient reports he has felt depressed \"since [his] parents \". fPatient gets his Doxepin from \"Dr. CRISSY chan/ Behavioral [facility]\" and sees a therapist 1-2 a week, Sonu Martinez ([facility] [phone]. Mental status assessment: Patient is ao x 4. Patient is calm, cooperative, and pleasant with treatment team. Patient mood is depressed, but affect is bright. Patient behaviors are appropriate and appearance is appropriate. Strengths/Recreation/Coping Skills: No insurance    Collateral information: Therapist Sonu Martinez ([facility]) [phone]. Mother Cristopher Garcia: [phone] (need consent). Current psychiatric /substance abuse providers and contact info: Sonu Martinez ([facility]) [phone]. Need a psychiatrist or psych NP. Patient reports he was prescribed Doxepin at high dosage for depression and sleep.     Previous psychiatric/substance abuse providers and response to treatment: Patient has been hospitalized for suicide attempt before, . Has taken Zoloft and Escitalopram. before, patient reports they made his stomach hurt. Family history of mental illness or substance abuse: Mom and Dad depression and anxiety. Substance abuse history:    Social History     Tobacco Use    Smoking status: Some Days     Packs/day: 0.50     Types: Cigarettes    Smokeless tobacco: Current   Substance Use Topics    Alcohol use: Yes     Alcohol/week: 14.0 standard drinks     Types: 2 Glasses of wine, 12 Cans of beer per week       History of biomedical complications associated with substance abuse: None    Patient's current acceptance of treatment or motivation for change: Moderate, patient is optimistic in regards to treatment    Family constellation: Close to mom, dad, sister, nieces and nephews. Mom lives in [place]    Is significant other involved? No    Describe support system: Strong    Describe living arrangements and home environment: Lives on [facility] in a [facility] [address]: [address] [place] [address] Name: n/a    Guardian Contact: Marty 851 issues:   Hospital Problems  Date Reviewed: 2022            Codes Class Noted POA    Depression (Chronic) ICD-10-CM: F32. A  ICD-9-CM: 890  2016 Unknown           Trauma history: Unable to assess    Legal issues: None    History of  service: No    Financial status: [facility] full time    Mu-ism/cultural factors: Jain    Education/work history: High school diploma.     Have you been licensed as a health care professional (current or ): No    Describe coping skills:Limited and ineffective, evident by ROSMERY Leigh  2023

## 2023-02-08 NOTE — PROGRESS NOTES
Behavioral Services  Medicare Certification Upon Admission    I certify that this patient's inpatient psychiatric hospital admission is medically necessary for:    [x] (1) Treatment which could reasonably be expected to improve this patient's condition,       [x] (2) Or for diagnostic study;     AND     [x](2) The inpatient psychiatric services are provided while the individual is under the care of a physician and are included in the individualized plan of care.     Estimated length of stay/service 5-7 days    Plan for post-hospital care home    Electronically signed by Olga Ray MD on 2/8/2023 at 2:46 PM

## 2023-02-08 NOTE — BH NOTES
Patient is a 34year old Black/  male received into the unit form [address] [address] [facility] med-surg unit as a TDO patient at [address] with the admitting diagnosis of depression and metabolic encephalopathy and was medically cleared. Per nursing report patient intentionally overdose on 20 capsules of doxepin on 02/04/2023 after an uncomfortable situation with his ex boyfriend. Patient had 2 previous overdose attempts in 2008 and 2011. Patient presented alert and oriented X 4, pleasant  with euthymic mood. Patient denied suicidal and homicidal ideation, AVH, anxiety, depression and pain. Patient contracted to safety on the unit, skin assessment completed by this writer and Mac Guajardo RN. Patient has a medical diagnosis of HIV on medication, and asthma. UDS  positive of THC. Safety initiated on the unit by Q 15 safety checks and nursing rounds.

## 2023-02-08 NOTE — PROGRESS NOTES
Pt appeared to have slept approx 6 hours during the night. No distress observed. Monitoring for safety continues.

## 2023-02-08 NOTE — BH NOTES
Family Collaboration    Writer called patient's mother [phone] to inform her the patient has agreed to stay voluntarily. Patient's mother reports that the patient \"was not himself\" prior to his overdose but that this is not something that happens often. Patient's mother reports that he does have depression, but it typically does not escalate as much as this. Patient's mother reports that when the patient discharges, she would like to come stay at his condo with him for a week or two, and writer agreed this is a safe discharge plan. Patient is connected with a therapist that he reports he sees two times a week. Patient's mother will be called tomorrow to discuss any updates regarding discharge and patient status.     Michelle Pearce, MSW Student

## 2023-02-08 NOTE — PROGRESS NOTES
Problem: Suicide  Goal: *STG: Seeks staff when feelings of self harm or harm towards others arise  2/8/2023 0158 by Amberly Fish  Outcome: Progressing Towards Goal  2/8/2023 0157 by Amberly Fish  Outcome: Progressing Towards Goal     Problem: Suicide  Goal: *STG: Remains safe in hospital  2/8/2023 0158 by Amberly Fish  Outcome: Progressing Towards Goal  2/8/2023 0157 by Amberly Fish  Outcome: Progressing Towards Goal     Problem: Anxiety-Behavioral Health (Adult/Pediatric)  Goal: *STG: Seeks staff when feelings of anxiety and fear arise  2/8/2023 0158 by Amberly Fish  Outcome: Progressing Towards Goal  2/8/2023 0157 by Amberly Fish  Outcome: Progressing Towards Goal

## 2023-02-09 VITALS
TEMPERATURE: 98.3 F | DIASTOLIC BLOOD PRESSURE: 78 MMHG | SYSTOLIC BLOOD PRESSURE: 100 MMHG | OXYGEN SATURATION: 100 % | RESPIRATION RATE: 16 BRPM | HEART RATE: 99 BPM

## 2023-02-09 PROCEDURE — 99239 HOSP IP/OBS DSCHRG MGMT >30: CPT | Performed by: PSYCHIATRY & NEUROLOGY

## 2023-02-09 PROCEDURE — 74011250637 HC RX REV CODE- 250/637: Performed by: PSYCHIATRY & NEUROLOGY

## 2023-02-09 RX ORDER — BUPROPION HYDROCHLORIDE 150 MG/1
150 TABLET ORAL
Qty: 30 TABLET | Refills: 1 | Status: SHIPPED | OUTPATIENT
Start: 2023-02-10

## 2023-02-09 RX ADMIN — BUPROPION HYDROCHLORIDE 150 MG: 150 TABLET, EXTENDED RELEASE ORAL at 08:03

## 2023-02-09 RX ADMIN — ELVITEGRAVIR, COBICISTAT, EMTRICITABINE, AND TENOFOVIR ALAFENAMIDE 1 TABLET: 150; 150; 200; 10 TABLET ORAL at 08:03

## 2023-02-09 NOTE — BH NOTES
GROUP THERAPY PROGRESS NOTE     Group time: 45 minutes     Personal goal for participation: To assess personal values and discuss handout. Goal orientation: personal     Group therapy participation: Attended      Therapeutic interventions reviewed and discussed: Patient was encouraged to identify personal values and described why they are important at current stage of life. Patient was encouraged to compare and contraste values from different stages in life and was able to identify how life experiences shape identified values.        Impression of participation: Patient did not participate      SHITAL Mims

## 2023-02-09 NOTE — PROGRESS NOTES
Problem: Discharge Planning  Goal: *Discharge to safe environment  Outcome: Progressing Towards Goal     Luis Doran was noted resting quietly in bed. He presented alert and oriented x4, broad affect, mood euthymic. He denied SI/HI/AVH. He voiced no concerns. He appeared to have slept approx 8 hours during the night. No distress observed. Monitoring for safety continues.

## 2023-02-09 NOTE — BH NOTES
Behavioral Health Transition Record to Provider    Patient Name: Yoselin Srinivasan III  YOB: 1990  Medical Record Number: [id number]  Date of Admission: 2/8/2023  Date of Discharge: 2/9/2023    Attending Provider: Sabino Stone, *  Discharging Provider: Xu Lawler  To contact this individual call [phone] and ask the  to page. If unavailable, ask to be transferred to [facility] Provider on call. [facility] Provider will be available on call 24/7 and during holidays. Primary Care Provider: Josiane Reynolds DO    No Known Allergies    Reason for Admission:  Patient presented to [facility] ER after making an intentional overdose on 2/4/23 (20 tabs of Doxepin). Patient endorses depression and SI due to being \"triggered by his ex\". Patient has had two prior attempts by overdose on medications (2004 & 2011). Patient reports he was not having any SI \"prior to [place]" and that it was only triggered by an interaction with his ex. Patient reports that he has been broken up with his ex boyfriend for a while, and that a friend of his ex reached out to him. Patient reports that the friend of his ex sent the conversation between him and the patient to his ex and their friend group. Patient reports that this was triggering and embarrassing, and that it reminded him of when he used to be bullied in school, triggering his SI. Patient reports he has felt depressed \"since [his] parents \". fPatient gets his Doxepin from \"Dr. CRISSY chan/ [facility]\" and sees a therapist 1-2 a week, Joe Celestin ([facility]) [phone]    Admission Diagnosis: Depression [F32. A]    * No surgery found *    Results for orders placed or performed during the hospital encounter of 02/04/23   COVID-19 RAPID TEST   Result Value Ref Range    Specimen source Nasopharyngeal      COVID-19 rapid test Not detected NOTD     RESPIRATORY VIRUS PANEL W/COVID-19, PCR    Specimen: Nasopharyngeal   Result Value Ref Range    Adenovirus Not detected NOTD      Coronavirus 229E Not detected NOTD      Coronavirus HKU1 Not detected NOTD      Coronavirus CVNL63 Not detected NOTD      Coronavirus OC43 Not detected NOTD      SARS-CoV-2, PCR Not detected NOTD      Metapneumovirus Not detected NOTD      Rhinovirus and Enterovirus Not detected NOTD      Influenza A Not detected NOTD      Influenza B Not detected NOTD      Parainfluenza 1 Not detected NOTD      Parainfluenza 2 Not detected NOTD      Parainfluenza 3 Not detected NOTD      Parainfluenza virus 4 Not detected NOTD      RSV by PCR Not detected NOTD      B. parapertussis, PCR Not detected NOTD      Bordetella pertussis - PCR Not detected NOTD      Chlamydophila pneumoniae DNA, QL, PCR Not detected NOTD      Mycoplasma pneumoniae DNA, QL, PCR Not detected NOTD     COVID-19 WITH INFLUENZA A/B   Result Value Ref Range    SARS-CoV-2 by PCR Not detected NOTD      Influenza A by PCR Not detected NOTD      Influenza B by PCR Not detected NOTD     CBC WITH AUTOMATED DIFF   Result Value Ref Range    WBC 5.4 4.1 - 11.1 K/uL    RBC 4.19 4. 10 - 5.70 M/uL    HGB 13.4 12.1 - 17.0 g/dL    HCT 39.7 36.6 - 50.3 %    MCV 94.7 80.0 - 99.0 FL    MCH 32.0 26.0 - 34.0 PG    MCHC 33.8 30.0 - 36.5 g/dL    RDW 13.6 11.5 - 14.5 %    PLATELET 304 041 - 391 K/uL    MPV 10.8 8.9 - 12.9 FL    NRBC 0.0 0  WBC    ABSOLUTE NRBC 0.00 0.00 - 0.01 K/uL    NEUTROPHILS 64 32 - 75 %    LYMPHOCYTES 28 12 - 49 %    MONOCYTES 8 5 - 13 %    EOSINOPHILS 0 0 - 7 %    BASOPHILS 0 0 - 1 %    IMMATURE GRANULOCYTES 0 0.0 - 0.5 %    ABS. NEUTROPHILS 3.4 1.8 - 8.0 K/UL    ABS. LYMPHOCYTES 1.5 0.8 - 3.5 K/UL    ABS. MONOCYTES 0.5 0.0 - 1.0 K/UL    ABS. EOSINOPHILS 0.0 0.0 - 0.4 K/UL    ABS. BASOPHILS 0.0 0.0 - 0.1 K/UL    ABS. IMM.  GRANS. 0.0 0.00 - 0.04 K/UL    DF AUTOMATED     METABOLIC PANEL, COMPREHENSIVE   Result Value Ref Range    Sodium 140 136 - 145 mmol/L    Potassium 2.7 (LL) 3.5 - 5.1 mmol/L    Chloride 104 97 - 108 mmol/L    CO2 28 21 - 32 mmol/L    Anion gap 8 5 - 15 mmol/L    Glucose 90 65 - 100 mg/dL    BUN 8 6 - 20 MG/DL    Creatinine 0.94 0.70 - 1.30 MG/DL    BUN/Creatinine ratio 9 (L) 12 - 20      eGFR >60 >60 ml/min/1.73m2    Calcium 8.7 8.5 - 10.1 MG/DL    Bilirubin, total 0.5 0.2 - 1.0 MG/DL    ALT (SGPT) 18 12 - 78 U/L    AST (SGOT) 12 (L) 15 - 37 U/L    Alk.  phosphatase 82 45 - 117 U/L    Protein, total 7.7 6.4 - 8.2 g/dL    Albumin 4.2 3.5 - 5.0 g/dL    Globulin 3.5 2.0 - 4.0 g/dL    A-G Ratio 1.2 1.1 - 2.2     SALICYLATE   Result Value Ref Range    Salicylate level <5.5 (L) 2.8 - 20.0 MG/DL   ACETAMINOPHEN   Result Value Ref Range    Acetaminophen level <2 (L) 10 - 30 ug/mL   URINALYSIS W/ RFLX MICROSCOPIC   Result Value Ref Range    Color YELLOW/STRAW      Appearance CLEAR CLEAR      Specific gravity 1.007      pH (UA) 6.5 5.0 - 8.0      Protein Negative NEG mg/dL    Glucose Negative NEG mg/dL    Ketone 15 (A) NEG mg/dL    Bilirubin Negative NEG      Blood Negative NEG      Urobilinogen 0.2 0.2 - 1.0 EU/dL    Nitrites Negative NEG      Leukocyte Esterase Negative NEG     ETHYL ALCOHOL   Result Value Ref Range    ALCOHOL(ETHYL),SERUM <10 <10 MG/DL   DRUG SCREEN, URINE   Result Value Ref Range    AMPHETAMINES Negative NEG      BARBITURATES Negative NEG      BENZODIAZEPINES Negative NEG      COCAINE Negative NEG      METHADONE Negative NEG      OPIATES Negative NEG      PCP(PHENCYCLIDINE) Negative NEG      THC (TH-CANNABINOL) Positive (A) NEG      Drug screen comment (NOTE)    MAGNESIUM   Result Value Ref Range    Magnesium 2.4 1.6 - 2.4 mg/dL   METABOLIC PANEL, BASIC   Result Value Ref Range    Sodium 140 136 - 145 mmol/L    Potassium 4.1 3.5 - 5.1 mmol/L    Chloride 111 (H) 97 - 108 mmol/L    CO2 26 21 - 32 mmol/L    Anion gap 3 (L) 5 - 15 mmol/L    Glucose 89 65 - 100 mg/dL    BUN 6 6 - 20 MG/DL    Creatinine 0.71 0.70 - 1.30 MG/DL    BUN/Creatinine ratio 8 (L) 12 - 20 eGFR >60 >60 ml/min/1.73m2    Calcium 8.2 (L) 8.5 - 10.1 MG/DL   CBC WITH AUTOMATED DIFF   Result Value Ref Range    WBC 4.7 4.1 - 11.1 K/uL    RBC 3.94 (L) 4.10 - 5.70 M/uL    HGB 13.1 12.1 - 17.0 g/dL    HCT 37.6 36.6 - 50.3 %    MCV 95.4 80.0 - 99.0 FL    MCH 33.2 26.0 - 34.0 PG    MCHC 34.8 30.0 - 36.5 g/dL    RDW 13.7 11.5 - 14.5 %    PLATELET 398 727 - 295 K/uL    MPV 10.3 8.9 - 12.9 FL    NRBC 0.0 0  WBC    ABSOLUTE NRBC 0.00 0.00 - 0.01 K/uL    NEUTROPHILS 63 32 - 75 %    LYMPHOCYTES 30 12 - 49 %    MONOCYTES 7 5 - 13 %    EOSINOPHILS 0 0 - 7 %    BASOPHILS 0 0 - 1 %    IMMATURE GRANULOCYTES 0 0.0 - 0.5 %    ABS. NEUTROPHILS 2.9 1.8 - 8.0 K/UL    ABS. LYMPHOCYTES 1.4 0.8 - 3.5 K/UL    ABS. MONOCYTES 0.3 0.0 - 1.0 K/UL    ABS. EOSINOPHILS 0.0 0.0 - 0.4 K/UL    ABS. BASOPHILS 0.0 0.0 - 0.1 K/UL    ABS. IMM. GRANS. 0.0 0.00 - 0.04 K/UL    DF AUTOMATED     HEPATIC FUNCTION PANEL   Result Value Ref Range    Protein, total 6.8 6.4 - 8.2 g/dL    Albumin 3.6 3.5 - 5.0 g/dL    Globulin 3.2 2.0 - 4.0 g/dL    A-G Ratio 1.1 1.1 - 2.2      Bilirubin, total 0.5 0.2 - 1.0 MG/DL    Bilirubin, direct 0.1 0.0 - 0.2 MG/DL    Alk. phosphatase 78 45 - 117 U/L    AST (SGOT) 12 (L) 15 - 37 U/L    ALT (SGPT) 16 12 - 78 U/L   MAGNESIUM   Result Value Ref Range    Magnesium 2.3 1.6 - 2.4 mg/dL   PHOSPHORUS   Result Value Ref Range    Phosphorus 2.8 2.6 - 4.7 MG/DL   BLOOD GAS, ARTERIAL   Result Value Ref Range    pH 7.26 (L) 7.35 - 7.45      PCO2 60 (H) 35 - 45 mmHg    PO2 45 (LL) 80 - 100 mmHg    O2 SAT 74 (L) 92 - 97 %    BICARBONATE 26 22 - 26 mmol/L    BASE DEFICIT 2.2 mmol/L    O2 METHOD ROOM AIR      SPONTANEOUS RATE 20      Sample source ARTERIAL      SITE RIGHT RADIAL      JESSIKA'S TEST NOT APPLICABLE      Critical value read back Called to SILVIA Javier RN on 02/05/2023 at 00:36    CK   Result Value Ref Range     39 - 308 U/L   LACTIC ACID   Result Value Ref Range    Lactic acid 0.6 0.4 - 2.0 MMOL/L   BLOOD GAS, ARTERIAL   Result Value Ref Range    pH 7.33 (L) 7.35 - 7.45      PCO2 46 (H) 35 - 45 mmHg    PO2 102 (H) 80 - 100 mmHg    O2 SAT 97 92 - 97 %    BICARBONATE 23 22 - 26 mmol/L    BASE DEFICIT 2.9 mmol/L    O2 METHOD ROOM AIR      Sample source ARTERIAL      SITE RIGHT RADIAL      JESSIKA'S TEST NOT APPLICABLE     METABOLIC PANEL, BASIC   Result Value Ref Range    Sodium 141 136 - 145 mmol/L    Potassium 3.7 3.5 - 5.1 mmol/L    Chloride 110 (H) 97 - 108 mmol/L    CO2 26 21 - 32 mmol/L    Anion gap 5 5 - 15 mmol/L    Glucose 98 65 - 100 mg/dL    BUN 10 6 - 20 MG/DL    Creatinine 0.86 0.70 - 1.30 MG/DL    BUN/Creatinine ratio 12 12 - 20      eGFR >60 >60 ml/min/1.73m2    Calcium 8.8 8.5 - 10.1 MG/DL   CBC WITH AUTOMATED DIFF   Result Value Ref Range    WBC 4.8 4.1 - 11.1 K/uL    RBC 3.96 (L) 4.10 - 5.70 M/uL    HGB 12.8 12.1 - 17.0 g/dL    HCT 37.9 36.6 - 50.3 %    MCV 95.7 80.0 - 99.0 FL    MCH 32.3 26.0 - 34.0 PG    MCHC 33.8 30.0 - 36.5 g/dL    RDW 13.9 11.5 - 14.5 %    PLATELET 251 534 - 863 K/uL    MPV 10.4 8.9 - 12.9 FL    NRBC 0.0 0  WBC    ABSOLUTE NRBC 0.00 0.00 - 0.01 K/uL    NEUTROPHILS 51 32 - 75 %    LYMPHOCYTES 38 12 - 49 %    MONOCYTES 9 5 - 13 %    EOSINOPHILS 1 0 - 7 %    BASOPHILS 1 0 - 1 %    IMMATURE GRANULOCYTES 0 0.0 - 0.5 %    ABS. NEUTROPHILS 2.5 1.8 - 8.0 K/UL    ABS. LYMPHOCYTES 1.8 0.8 - 3.5 K/UL    ABS. MONOCYTES 0.4 0.0 - 1.0 K/UL    ABS. EOSINOPHILS 0.1 0.0 - 0.4 K/UL    ABS. BASOPHILS 0.0 0.0 - 0.1 K/UL    ABS. IMM.  GRANS. 0.0 0.00 - 0.04 K/UL    DF AUTOMATED     MAGNESIUM   Result Value Ref Range    Magnesium 2.1 1.6 - 2.4 mg/dL   PHOSPHORUS   Result Value Ref Range    Phosphorus 3.3 2.6 - 4.7 MG/DL   METABOLIC PANEL, BASIC   Result Value Ref Range    Sodium 138 136 - 145 mmol/L    Potassium 3.9 3.5 - 5.1 mmol/L    Chloride 108 97 - 108 mmol/L    CO2 27 21 - 32 mmol/L    Anion gap 3 (L) 5 - 15 mmol/L    Glucose 98 65 - 100 mg/dL    BUN 8 6 - 20 MG/DL    Creatinine 1.00 0.70 - 1.30 MG/DL    BUN/Creatinine ratio 8 (L) 12 - 20      eGFR >60 >60 ml/min/1.73m2    Calcium 8.7 8.5 - 10.1 MG/DL   CBC WITH AUTOMATED DIFF   Result Value Ref Range    WBC 3.9 (L) 4.1 - 11.1 K/uL    RBC 4.03 (L) 4.10 - 5.70 M/uL    HGB 13.1 12.1 - 17.0 g/dL    HCT 39.6 36.6 - 50.3 %    MCV 98.3 80.0 - 99.0 FL    MCH 32.5 26.0 - 34.0 PG    MCHC 33.1 30.0 - 36.5 g/dL    RDW 13.9 11.5 - 14.5 %    PLATELET 623 622 - 361 K/uL    MPV 10.7 8.9 - 12.9 FL    NRBC 0.0 0  WBC    ABSOLUTE NRBC 0.00 0.00 - 0.01 K/uL    NEUTROPHILS 49 32 - 75 %    LYMPHOCYTES 41 12 - 49 %    MONOCYTES 8 5 - 13 %    EOSINOPHILS 1 0 - 7 %    BASOPHILS 1 0 - 1 %    IMMATURE GRANULOCYTES 0 0.0 - 0.5 %    ABS. NEUTROPHILS 1.9 1.8 - 8.0 K/UL    ABS. LYMPHOCYTES 1.6 0.8 - 3.5 K/UL    ABS. MONOCYTES 0.3 0.0 - 1.0 K/UL    ABS. EOSINOPHILS 0.0 0.0 - 0.4 K/UL    ABS. BASOPHILS 0.0 0.0 - 0.1 K/UL    ABS. IMM.  GRANS. 0.0 0.00 - 0.04 K/UL    DF AUTOMATED     MAGNESIUM   Result Value Ref Range    Magnesium 1.9 1.6 - 2.4 mg/dL   PHOSPHORUS   Result Value Ref Range    Phosphorus 3.7 2.6 - 4.7 MG/DL   EKG, 12 LEAD, INITIAL   Result Value Ref Range    Ventricular Rate 76 BPM    Atrial Rate 76 BPM    P-R Interval 146 ms    QRS Duration 92 ms    Q-T Interval 370 ms    QTC Calculation (Bezet) 416 ms    Calculated P Axis 71 degrees    Calculated R Axis 62 degrees    Calculated T Axis 67 degrees    Diagnosis       Normal sinus rhythm  Minimal voltage criteria for LVH, may be normal variant  When compared with ECG of 15-APR-2019 08:56,  No significant change was found  Confirmed by Donis Duke, P.VLoreto ([id number]) on 2/7/2023 10:47:01 PM     EKG, 12 LEAD, INITIAL   Result Value Ref Range    Ventricular Rate 95 BPM    Atrial Rate 95 BPM    P-R Interval 134 ms    QRS Duration 96 ms    Q-T Interval 352 ms    QTC Calculation (Bezet) 442 ms    Calculated P Axis 70 degrees    Calculated R Axis 58 degrees    Calculated T Axis 58 degrees    Diagnosis       Normal sinus rhythm  Normal ECG  When compared with ECG of 04-FEB-2023 20:59,  No significant change was found  Confirmed by Floyce Sins, P.V. ([id number]) on 2/7/2023 10:50:33 PM     EKG, 12 LEAD, INITIAL   Result Value Ref Range    Ventricular Rate 90 BPM    Atrial Rate 90 BPM    P-R Interval 144 ms    QRS Duration 88 ms    Q-T Interval 346 ms    QTC Calculation (Bezet) 423 ms    Calculated P Axis 70 degrees    Calculated R Axis 61 degrees    Calculated T Axis 67 degrees    Diagnosis       Normal sinus rhythm  Minimal voltage criteria for LVH, may be normal variant  Borderline ECG  When compared with ECG of 05-FEB-2023 01:28,  No significant change was found  Confirmed by Floyce Rizwans, P.V. ([id number]) on 2/7/2023 10:52:04 PM     EKG, 12 LEAD, SUBSEQUENT   Result Value Ref Range    Ventricular Rate 80 BPM    Atrial Rate 80 BPM    P-R Interval 136 ms    QRS Duration 94 ms    Q-T Interval 366 ms    QTC Calculation (Bezet) 422 ms    Calculated P Axis 78 degrees    Calculated R Axis 70 degrees    Calculated T Axis 64 degrees    Diagnosis       Normal sinus rhythm  Normal ECG  When compared with ECG of 04-FEB-2023 20:59,  No significant change was found  Confirmed by Floyce Rizwans, P.V. ([id number]) on 2/7/2023 11:08:40 PM     EKG, 12 LEAD, INITIAL   Result Value Ref Range    Ventricular Rate 79 BPM    Atrial Rate 79 BPM    P-R Interval 140 ms    QRS Duration 92 ms    Q-T Interval 374 ms    QTC Calculation (Bezet) 428 ms    Calculated P Axis 85 degrees    Calculated R Axis 81 degrees    Calculated T Axis 74 degrees    Diagnosis       Normal sinus rhythm  Moderate voltage criteria for LVH, may be normal variant  Early repolarization  Borderline ECG  When compared with ECG of 05-FEB-2023 06:14,  No significant change was found  Confirmed by Floyvette Astorgas, P.V. ([id number]) on 2/7/2023 11:07:11 PM         Immunizations administered during this encounter:   Immunization History   Administered Date(s) Administered    COVID-19, PFIZER PURPLE top, DILUTE for use, (age 15 y+), IM, 30mcg/0.3mL 01/16/2021, 02/06/2021, 11/02/2021    Hep A Vaccine (Adult) 05/10/2016    Influenza Vaccine 12/03/2013    Influenza Vaccine Intradermal PF 10/20/2015    Influenza, FLUARIX, FLULAVAL, FLUZONE (age 10 mo+) AND AFLURIA, (age 1 y+), PF, 0.5mL 03/03/2017, 12/10/2019    Meningococcal (MCV4O) Vaccine 06/11/2019, 12/10/2019    Pneumococcal Conjugate (PCV-13) 07/12/2018    Pneumococcal Polysaccharide (PPSV-23) 03/03/2017    TB Skin Test (PPD) Intradermal 07/07/2014       Screening for Metabolic Disorders for Patients on Antipsychotic Medications  (Data obtained from the EMR)    Estimated Body Mass Index  Estimated body mass index is 22.6 kg/m² as calculated from the following:    Height as of 2/4/23: 5' 6\" (1.676 m). Weight as of 2/4/23: 63.5 kg (140 lb). Vital Signs/Blood Pressure  Visit Vitals  /78   Pulse 99   Temp 98.3 °F (36.8 °C)   Resp 16   SpO2 100%       Blood Glucose/Hemoglobin A1c  Lab Results   Component Value Date/Time    Glucose 98 02/07/2023 03:38 AM       Lab Results   Component Value Date/Time    Hemoglobin A1c 5.2 11/09/2017 04:06 PM        Lipid Panel  No results found for: CHOL, CHOLX, CHLST, CHOLV, 688958, HDL, HDLP, LDL, LDLC, DLDLP, TGLX, TRIGL, TRIGP, CHHD, CHHDX     Discharge Diagnosis: Please refer to physician's summary     Discharge Plan:   The patient Andreina Wililam III exhibits the ability to control behavior in a less restrictive environment. Patient's level of functioning is improving. No assaultive/destructive behavior has been observed for the past 24 hours. No suicidal/homicidal threat or behavior has been observed for the past 24 hours. There is no evidence of serious medication side effects. Patient has not been in physical or protective restraints for at least the past 24 hours. If weapons involved, how are they secured? No access     Is patient aware of and in agreement with discharge plan?  Yes     Arrangements for medication:  Prescriptions sent to pharmacy      Copy of discharge instructions to provider?:  Yes     Arrangements for transportation home:  Mom     Keep all follow up appointments as scheduled, continue to take prescribed medications per physician instructions. Mental health crisis number:  [phone] or your local mental health crisis line number at [phone]        [facility] 222 Emergency WARM LINE      [phone] [phone])      M-F: 9am to 9pm      Sat & Sun: [address] [facility] suicide prevention lines:                             1-592-WYWRMEK ([phone])       8-502-315-TALK ([phone])   24/7 Crisis Text Line:  Text HOME to [phone]           Discharge Medication List and Instructions:   Current Discharge Medication List          Unresulted Labs (24h ago, onward)       Start     Ordered    02/11/23 0500  SARS-COV-2  ONE TIME,   R        Question:  Specimen source  Answer:  Nasopharyngeal    02/09/23 0854                  To obtain results of studies pending at discharge, please contact medical records [phone] option 4. Follow-up Information    None         Advanced Directive:   Does the patient have an appointed surrogate decision maker? No  Does the patient have a Medical Advance Directive? No  Does the patient have a Psychiatric Advance Directive? No  If the patient does not have a surrogate or Medical Advance Directive AND Psychiatric Advance Directive, the patient was offered information on these advance directives Patient declined to complete    Patient Instructions: Please continue all medications until otherwise directed by physician. Tobacco Cessation Discharge Plan:   Is the patient a smoker and needs referral for smoking cessation? No  Patient referred to the following for smoking cessation with an appointment? No     Patient was offered medication to assist with smoking cessation at discharge? No  Was education for smoking cessation added to the discharge instructions?  No    Alcohol/Substance Abuse Discharge Plan:   Does the patient have a history of substance/alcohol abuse and requires a referral for treatment? Refused  Patient referred to the following for substance/alcohol abuse treatment with an appointment? Refused  Patient was offered medication to assist with alcohol cessation at discharge? Refused  Was education for substance/alcohol abuse added to discharge instructions? Refused    Patient discharged to Home; provided to the patient/caregiver either in hard copy or electronically.

## 2023-02-09 NOTE — PROGRESS NOTES
Problem: Suicide  Goal: *STG: Remains safe in hospital  Outcome: Progressing Towards Goal    Shift change report given to Janis MAGAÑA (oncoming nurse) by Nicole Ganser (offgoing nurse). Report included the following information SBAR, Kardex, MAR, and Recent Results. Assumed care of patient. Met patient in hallway. Calm and cooperative during assessment. Patient smiling. Presented with a euthymic mood. Patient denied anxiety, depression, SI, HI and AVH. No complaints of pain. Patient requesting to leave. Writer encouraged patient to talk with treatment team. Patient to speak with treatment team, happy to be discharged. Discharge instructions reviewed with patient. Patient visible on unit. In dayroom for groups and to talk to peers. No issues noted throughout shift.

## 2023-02-09 NOTE — BH NOTES
Writer contacted pt mother. Writer asked if pt mother has access to pt condo, which she does. Pt will call his mother to tell her where his insurance card is. Pt mother will bring pt insurance card when she picks him up for discharge.     Junette Canavan, MSW Student

## 2023-02-09 NOTE — BH NOTES
DISCHARGE SUMMARY    NAME:James Hollice Lesches  : 1990  MRN: [id number]    The patient Narcisa Head III exhibits the ability to control behavior in a less restrictive environment. Patient's level of functioning is improving. No assaultive/destructive behavior has been observed for the past 24 hours. No suicidal/homicidal threat or behavior has been observed for the past 24 hours. There is no evidence of serious medication side effects. Patient has not been in physical or protective restraints for at least the past 24 hours. If weapons involved, how are they secured? No access    Is patient aware of and in agreement with discharge plan? Yes    Arrangements for medication:  Prescriptions sent to pharmacy     Copy of discharge instructions to provider?:  Yes    Arrangements for transportation home:  Mom    Keep all follow up appointments as scheduled, continue to take prescribed medications per physician instructions.   Mental health crisis number:  810 or your local mental health crisis line number at [phone]      [facility] 222 Emergency WARM LINE      [phone] [phone]      M-F: 9am to 9pm      Sat & Sun: 5pm - 9pm  National suicide prevention lines:                             8-788-VVRQTGA ([phone])       5-133-561-TALK ([phone])    Crisis Text Line:  Text HOME to [phone]

## 2023-02-09 NOTE — DISCHARGE INSTRUCTIONS
If I feel I am at risk of hurting myself or others, I will call the crisis office and speak with a crisis worker who will assist me during my crisis.    [address]  [phone]  [facility] [place] [address] [phone]  [address] [address] crisis- [phone]

## 2023-02-09 NOTE — GROUP NOTE
SHAGUFTA  GROUP DOCUMENTATION INDIVIDUAL                                                                          Group Therapy Note    Date: 2/9/2023    Group Start Time: 0900  Group End Time: 1000  Group Topic: Topic Group    [facility] [place] [address] ACUTE BEHAV TH    [place], [address] [facility] GROUP DOCUMENTATION GROUP    Group Therapy Note    Attendees: 8       Attendance: Attended    Patient's Goal:  To participate in mental health journey game    Interventions/techniques: Supported-choices in recovery    Follows Directions:  Followed directions    Interactions: Interacted appropriately    Mental Status: Calm    Behavior/appearance: Attentive and Cooperative    Goals Achieved: Able to engage in interactions, Able to listen to others, Able to give feedback to another, Able to reflect/comment on own behavior, Able to manage/cope with feelings, Able to self-disclose, and Discussed coping      Valarie Barthel

## 2023-02-10 NOTE — DISCHARGE SUMMARY
PSYCHIATRIC DISCHARGE SUMMARY         IDENTIFICATION:    Patient Name  Raine Santoyo III   Date of Birth 1990   [facility] [id number]   Medical Record Number  [id number]      Age  34 y.o. PCP Nawaf Farley DO   Admit date:  2/8/2023    Discharge date: 2/9/2023   Room Number  321/02  @ [address]   Date of Service  2/9/2023            TYPE OF DISCHARGE: REGULAR               CONDITION AT DISCHARGE: improved and fair       PROVISIONAL & DISCHARGE DIAGNOSES:    Problem List  Date Reviewed: 6/30/2022            Codes Class    * (Principal) Moderate episode of recurrent major depressive disorder (HCC) ICD-10-CM: F33.1  ICD-9-CM: 296.32         HIV (human immunodeficiency virus infection) ([address] ICD-10-CM: B20  ICD-9-CM: [address] Problems    *Moderate episode of recurrent major depressive disorder ([address]        DISCHARGE DIAGNOSIS:   Axis I:  SEE ABOVE  Axis II: SEE ABOVE  Axis III: SEE ABOVE  Axis IV:  lack of structure  Axis V:  30 on admission, 60 on discharge     CC & HISTORY OF PRESENT ILLNESS:  \"Suicidal ideation\"    The patient, Nia Escobar, is a 34 y.o. BLACK/ male with a past psychiatric history significant for major depressive disorder and cannabis use disorder, who presents at this time with complaints of (and/or evidence of) the following emotional symptoms: depression and suicidal thoughts/threats. Additional symptomatology include mood lability. The above symptoms have been present for 2+ weeks. These symptoms are of moderate to high severity. These symptoms are constant in nature. The patient's condition has been precipitated by psychosocial stressors. Patient's condition made worse by continued illicit drug use as well as treatment noncompliance. UDS: +THC; BAL=0. The patient reports feeling desperate due to relationship stressors which led to an intentional overdose on Doxepin.  Patient has been visible, pleasant and cooperative. The patient is a good historian. The patient corroborates the above narrative. The patient contracts for safety on the unit and gives consent for the team to contact collateral. The patient is amenable to initiating treatment while on the unit. SOCIAL HISTORY:    Social History     Socioeconomic History    Marital status: SINGLE     Spouse name: Not on file    Number of children: Not on file    Years of education: Not on file    Highest education level: Not on file   Occupational History    Not on file   Tobacco Use    Smoking status: Some Days     Packs/day: 0.50     Types: Cigarettes    Smokeless tobacco: Current   Vaping Use    Vaping Use: Never used   Substance and Sexual Activity    Alcohol use: Yes     Alcohol/week: 14.0 standard drinks     Types: 2 Glasses of wine, 12 Cans of beer per week    Drug use: Yes     Types: Marijuana    Sexual activity: Yes     Partners: Male   Other Topics Concern    Not on file   Social History Narrative    Not on file     Social Determinants of Health     Financial Resource Strain: Not on file   Food Insecurity: Not on file   Transportation Needs: Not on file   Physical Activity: Not on file   Stress: Not on file   Social Connections: Not on file   Intimate Partner Violence: Not on file   Housing Stability: Not on file      FAMILY HISTORY:   Family History   Problem Relation Age of Onset    Hypertension Mother     Diabetes Mother     Depression Mother     Anxiety Mother     Depression Father     Hypertension Father              HOSPITALIZATION COURSE:    Raine Santoyo III was admitted to the inpatient psychiatric unit [facility] for acute psychiatric stabilization in regards to symptomatology as described in the HPI above. The differential diagnosis at time of admission included: MDD vs adjustment disorder. While on the unit Raine Santoyo III was involved in individual, group, occupational and milieu therapy.   Psychiatric medications were adjusted during this hospitalization including Wellbutrin. Alicia Yu III demonstrated a slow, but progressive improvement in overall condition. Much of patient's initial presentation appeared to be related to situational stressors, effects of medication non-compliance drugs of abuse, and psychological factors. Please see individual progress notes for more specific details regarding patient's hospitalization course. At time of discharge, Alicia Yu III is without significant problems of depression, psychosis, or michelet. Patient free of suicidal and homicidal ideations (appears to be at very low risk of suicide or homicide) and reports many positive predictive factors in terms of not attempting suicide or homicide. Overall presentation at time of discharge is most consistent with the diagnosis of MDD and cannabis use disorder. Patient has maximized benefit to be derived from acute inpatient psychiatric treatment. All members of the treatment team concur with each other in regards to plans for discharge today. Patient and family are aware and in agreement with discharge and discharge plan. LABS AND IMAGAING:    Labs Reviewed - No data to display  No results found for: DS35, PHEN, PHENO, PHENT, DILF, DS39, PHENY, PTN, VALF2, VALAC, VALP, VALPR, DS6, CRBAM, CRBAMP, CARB2, XCRBAM  No results displayed because visit has over 200 results. No results found. DISPOSITION:    Home. Patient to f/u with psychiatric and psychotherapy appointments. Patient is to f/u with internist as directed. FOLLOW-UP CARE:    Activity as tolerated  Regular diet  Wound Care: none needed. Follow-up Information       Follow up With Specialties Details Why [address]  Follow up  [facility]  [phone]. [facility]  Go in 1 week(s) Follow up as needed for outpatient mental health and psychiatric services.  [address] [facility] Access: Monday-Friday 8:00am-2:00pm  [phone]    Crisis Resources  Follow up  National Suicide Prevention Lifeline:  24/7 hotline, 1 026 811 69 92  Warm Line:  Monday-Friday 9 AM-9 PM, Saturday-Sunday 5 PM- 9 PM  1 03.96.32.45.30  Crisis Text Line:  24/7 crisis text messaging. Text HOME to 305435. Substance Abuse [facility]. .. Veronique Gong DO Internal Medicine Physician   [address]  [facility] [address]  [facility]  [phone]                     PROGNOSIS:   Alpesh Lopez ---- based on nature of patient's pathology/ies and treatment compliance issues. Prognosis is greatly dependent upon patient's ability to remain sober and to follow up with scheduled appointments as well as to comply with psychiatric medications as prescribed. DISCHARGE MEDICATIONS:   Informed consent given for the use of following psychotropic medications:  Discharge Medication List as of 2/9/2023  4:00 PM        START taking these medications    Details   buPROPion XL (WELLBUTRIN XL) 150 mg tablet Take 1 Tablet by mouth every morning. Indications: major depressive disorder, Normal, Disp-30 Tablet, R-1           CONTINUE these medications which have NOT CHANGED    Details   elvitegravir-cobicistat-emtricitabine-tenofovir alafenamide (Genvoya) tab tablet TAKE 1 TABLET BY MOUTH EVERY DAY WITH BREAKFAST, Normal, Disp-30 Tablet, R-3           STOP taking these medications       doxepin (SINEquan) 25 mg capsule Comments:   Reason for Stopping:                      A coordinated, multidisplinary treatment team round was conducted with Marla Soliman III---this is done daily here at [facility]. This team consists of the nurse, psychiatric unit pharmacist,  and Katelyn Cain. I have spent greater than 35 minutes on discharge work.     Signed:  Karen Rosario MD  2/9/2023

## 2023-02-13 ENCOUNTER — OFFICE VISIT (OUTPATIENT)
Dept: INTERNAL MEDICINE CLINIC | Age: 33
End: 2023-02-13

## 2023-02-13 VITALS
HEART RATE: 89 BPM | WEIGHT: 141.2 LBS | RESPIRATION RATE: 16 BRPM | OXYGEN SATURATION: 99 % | SYSTOLIC BLOOD PRESSURE: 112 MMHG | DIASTOLIC BLOOD PRESSURE: 71 MMHG | BODY MASS INDEX: 22.69 KG/M2 | HEIGHT: 66 IN | TEMPERATURE: 98.2 F

## 2023-02-13 DIAGNOSIS — F41.9 ANXIETY AND DEPRESSION: ICD-10-CM

## 2023-02-13 DIAGNOSIS — T43.014A TRICYCLIC ANTIDEPRESSANT OVERDOSE OF UNDETERMINED INTENT, INITIAL ENCOUNTER: Primary | ICD-10-CM

## 2023-02-13 DIAGNOSIS — Z72.0 TOBACCO ABUSE: ICD-10-CM

## 2023-02-13 DIAGNOSIS — Z21 ASYMPTOMATIC HIV INFECTION (HCC): ICD-10-CM

## 2023-02-13 DIAGNOSIS — G47.09 OTHER INSOMNIA: ICD-10-CM

## 2023-02-13 DIAGNOSIS — F32.A ANXIETY AND DEPRESSION: ICD-10-CM

## 2023-02-13 PROCEDURE — 99214 OFFICE O/P EST MOD 30 MIN: CPT | Performed by: INTERNAL MEDICINE

## 2023-02-13 RX ORDER — BUPROPION HYDROCHLORIDE 150 MG/1
150 TABLET ORAL
Qty: 30 TABLET | Refills: 5 | Status: SHIPPED | OUTPATIENT
Start: 2023-02-13
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
## 2023-02-13 NOTE — PROGRESS NOTES
1. \"Have you been to the ER, urgent care clinic since your last visit? Hospitalized since your last visit? \" Yes see encounter    2. \"Have you seen or consulted any other health care providers outside of the [address] [place] [address] since your last visit? \" No     3. For patients aged 39-70: Has the patient had a colonoscopy / FIT/ Cologuard? NA - based on age      If the patient is female:    4. For patients aged 41-77: Has the patient had a mammogram within the past 2 years? NA - based on age or sex      11. For patients aged 21-65: Has the patient had a pap smear?  NA - based on age or sex

## 2023-04-26 NOTE — TELEPHONE ENCOUNTER
Received call from Azul Berman from [facility] Department. She states that patient is there now for positive RPR and is going to get his treatment there. She is requesting last RPR. She was informed last RPR was done 11/9/17 and was non reactive. She verbalized understanding and will call back if they have any further questions.
IV intact

## 2023-06-15 DIAGNOSIS — Z21 ASYMPTOMATIC HUMAN IMMUNODEFICIENCY VIRUS (HIV) INFECTION STATUS (HCC): Primary | ICD-10-CM

## 2023-06-15 RX ORDER — ELVITEGRAVIR, COBICISTAT, EMTRICITABINE, AND TENOFOVIR ALAFENAMIDE 150; 150; 200; 10 MG/1; MG/1; MG/1; MG/1
1 TABLET ORAL
Qty: 30 TABLET | Refills: 3 | Status: SHIPPED | OUTPATIENT
Start: 2023-06-15 | End: 2023-07-24 | Stop reason: SDUPTHER

## 2023-06-15 NOTE — TELEPHONE ENCOUNTER
Patient called, he is requesting a refill for    [place] [id number] [facility] [address] [facility] [address]    Patient's phone  [phone]

## 2023-07-11 ENCOUNTER — TELEPHONE (OUTPATIENT)
Age: 33
End: 2023-07-11

## 2023-07-24 DIAGNOSIS — Z21 ASYMPTOMATIC HUMAN IMMUNODEFICIENCY VIRUS (HIV) INFECTION STATUS (HCC): ICD-10-CM

## 2023-07-24 RX ORDER — ELVITEGRAVIR, COBICISTAT, EMTRICITABINE, AND TENOFOVIR ALAFENAMIDE 150; 150; 200; 10 MG/1; MG/1; MG/1; MG/1
1 TABLET ORAL
Qty: 30 TABLET | Refills: 1 | Status: SHIPPED | OUTPATIENT
Start: 2023-07-24

## 2023-09-22 DIAGNOSIS — Z21 ASYMPTOMATIC HUMAN IMMUNODEFICIENCY VIRUS (HIV) INFECTION STATUS (HCC): ICD-10-CM

## 2023-09-22 NOTE — TELEPHONE ENCOUNTER
Previous refill encounter(s): 7/24/23 #30 with 1 refill    Requested Prescriptions     Pending Prescriptions Disp Refills    GENVOYA [id number] MG TABLET [Pharmacy Med Name: Luciana Able 150/150/200/10MG TAB] 30 tablet 1     Sig: TAKE 1 TABLET BY MOUTH EVERY DAY WITH BREAKFAST         For Pharmacy Admin Tracking Only    Program: Medication Refill  CPA in place:    Recommendation Provided To:    Intervention Detail: New Rx: 1, reason: Patient Preference  Intervention Accepted By:   Fredo Whitley Closed?:    Time Spent (min): 5

## 2023-09-23 RX ORDER — ELVITEGRAVIR, COBICISTAT, EMTRICITABINE, AND TENOFOVIR ALAFENAMIDE 150; 150; 200; 10 MG/1; MG/1; MG/1; MG/1
1 TABLET ORAL
Qty: 30 TABLET | Refills: 1 | Status: SHIPPED | OUTPATIENT
Start: 2023-09-23

## 2023-10-18 DIAGNOSIS — Z21 ASYMPTOMATIC HUMAN IMMUNODEFICIENCY VIRUS (HIV) INFECTION STATUS (HCC): ICD-10-CM

## 2023-10-19 RX ORDER — ELVITEGRAVIR, COBICISTAT, EMTRICITABINE, AND TENOFOVIR ALAFENAMIDE 150; 150; 200; 10 MG/1; MG/1; MG/1; MG/1
1 TABLET ORAL
Qty: 30 TABLET | Refills: 1 | Status: SHIPPED | OUTPATIENT
Start: 2023-10-19

## 2023-11-20 DIAGNOSIS — Z21 ASYMPTOMATIC HUMAN IMMUNODEFICIENCY VIRUS (HIV) INFECTION STATUS (HCC): ICD-10-CM

## 2023-11-21 RX ORDER — ELVITEGRAVIR, COBICISTAT, EMTRICITABINE, AND TENOFOVIR ALAFENAMIDE 150; 150; 200; 10 MG/1; MG/1; MG/1; MG/1
1 TABLET ORAL
Qty: 30 TABLET | Refills: 1 | Status: ACTIVE | OUTPATIENT
Start: 2023-11-21